# Patient Record
Sex: FEMALE | Race: WHITE | NOT HISPANIC OR LATINO | Employment: FULL TIME | URBAN - METROPOLITAN AREA
[De-identification: names, ages, dates, MRNs, and addresses within clinical notes are randomized per-mention and may not be internally consistent; named-entity substitution may affect disease eponyms.]

---

## 2017-04-07 ENCOUNTER — OFFICE VISIT (OUTPATIENT)
Dept: FAMILY MEDICINE | Facility: CLINIC | Age: 27
End: 2017-04-07
Payer: COMMERCIAL

## 2017-04-07 VITALS
BODY MASS INDEX: 35.77 KG/M2 | WEIGHT: 201.9 LBS | DIASTOLIC BLOOD PRESSURE: 82 MMHG | OXYGEN SATURATION: 99 % | SYSTOLIC BLOOD PRESSURE: 113 MMHG | HEIGHT: 63 IN | HEART RATE: 85 BPM | TEMPERATURE: 97.9 F

## 2017-04-07 DIAGNOSIS — Z00.00 ENCOUNTER FOR ROUTINE ADULT HEALTH EXAMINATION WITHOUT ABNORMAL FINDINGS: Primary | ICD-10-CM

## 2017-04-07 DIAGNOSIS — Z30.431 INTRAUTERINE DEVICE SURVEILLANCE: ICD-10-CM

## 2017-04-07 PROCEDURE — 99395 PREV VISIT EST AGE 18-39: CPT | Performed by: FAMILY MEDICINE

## 2017-04-07 PROCEDURE — G0145 SCR C/V CYTO,THINLAYER,RESCR: HCPCS | Performed by: FAMILY MEDICINE

## 2017-04-07 NOTE — MR AVS SNAPSHOT
After Visit Summary   4/7/2017    Liyah read    MRN: [id number]           Patient Information     Date Of Birth          1990        Visit Information        Provider Department      4/7/2017 3:00 PM Soumya Castellon MD [facility]        Today's Diagnoses     Encounter for routine adult health examination without abnormal findings    -  1    Intrauterine device surveillance          Care Instructions      Preventive Health Recommendations  Female Ages 26 - 39  Yearly exam:   See your health care provider every year in order to    Review health changes.     Discuss preventive care.      Review your medicines if you your doctor has prescribed any.    Until age 30: Get a Pap test every three years (more often if you have had an abnormal result).    After age 30: Talk to your doctor about whether you should have a Pap test every 3 years or have a Pap test with HPV screening every 5 years.   You do not need a Pap test if your uterus was removed (hysterectomy) and you have not had cancer.  You should be tested each year for STDs (sexually transmitted diseases), if you're at risk.   Talk to your provider about how often to have your cholesterol checked.  If you are at risk for diabetes, you should have a diabetes test (fasting glucose).  Shots: Get a flu shot each year. Get a tetanus shot every 10 years.   Nutrition:     Eat at least 5 servings of fruits and vegetables each day.    Eat whole-grain bread, whole-wheat pasta and brown rice instead of white grains and rice.    Talk to your provider about Calcium and Vitamin D.     Lifestyle    Exercise at least 150 minutes a week (30 minutes a day, 5 days of the week). This will help you control your weight and prevent disease.    Limit alcohol to one drink per day.    No smoking.     Wear sunscreen to prevent skin cancer.    See your dentist every six months for an exam and cleaning.          Follow-ups after your visit       "  Who to contact     If you have questions or need follow up information about today's clinic visit or your schedule please contact [facility] directly at [phone].  Normal or non-critical lab and imaging results will be communicated to you by MyChart, letter or phone within 4 business days after the clinic has received the results. If you do not hear from us within 7 days, please contact the clinic through MyChart or phone. If you have a critical or abnormal lab result, we will notify you by phone as soon as possible.  Submit refill requests through [email or web address] or call your pharmacy and they will forward the refill request to us. Please allow 3 business days for your refill to be completed.          Additional Information About Your Visit        [employer] Information     [email or web address] gives you secure access to your electronic health record. If you see a primary care provider, you can also send messages to your care team and make appointments. If you have questions, please call your primary care clinic.  If you do not have a primary care provider, please call [phone] and they will assist you.        Care EveryWhere ID     This is your Care EveryWhere ID. This could be used by other organizations to access your [facility] medical records  [id number]        Your Vitals Were     Pulse Temperature Height Last Period Pulse Oximetry Breastfeeding?    85 97.9  F (36.6  C) (Oral) 5' 3\" (1.6 m) 03/19/2017 (Exact Date) 99% No    BMI (Body Mass Index)                   35.76 kg/m2            Blood Pressure from Last 3 Encounters:   04/07/17 113/82   07/22/16 124/82   12/08/15 127/79    Weight from Last 3 Encounters:   04/07/17 201 lb 14.4 oz (91.6 kg)   07/22/16 209 lb 11.2 oz (95.1 kg)   12/08/15 187 lb (84.8 kg)              We Performed the Following     PAP imaged thin layer screen reflex to HPV if ASCUS - recommended age 25 - 29 years        Primary Care Provider Office Phone # Fax #    Soumya Castellon MD " [phone]       [facility] [address] [facility] [address] [address]        Thank you!     Thank you for choosing [facility]  for your care. Our goal is always to provide you with excellent care. Hearing back from our patients is one way we can continue to improve our services. Please take a few minutes to complete the written survey that you may receive in the mail after your visit with us. Thank you!             Your Updated Medication List - Protect others around you: Learn how to safely use, store and throw away your medicines at www.disposemymeds.org.          This list is accurate as of: 4/7/17  3:26 PM.  Always use your most recent med list.                   Brand Name Dispense Instructions for use    levonorgestrel 20 MCG/24HR IUD    MIRENA     1 each by Intrauterine route once

## 2017-04-07 NOTE — PROGRESS NOTES
SUBJECTIVE:     CC: Liyah read is an 26 year old woman who presents for preventive health visit.     Physical   Annual:     Getting at least 3 servings of Calcium per day::  NO    Bi-annual eye exam::  Yes    Dental care twice a year::  Yes    Sleep apnea or symptoms of sleep apnea::  None    Diet::  Regular (no restrictions)    Frequency of exercise::  2-3 days/week    Duration of exercise::  15-30 minutes    Taking medications regularly::  Not Applicable    Additional concerns today::  No    Exercise- walks a lot, in and out of work (walks dog a lot).  Gets to gym ~3x/wk, though less lately.  30 min cardio, 30 min of weights.    Contraception- mirena IUD, inserted 11/13.  By time it's due to come out, they may be ready to start trying to conceive.    Doing well otherwise- no concerns.      Today's PHQ-2 Score:   PHQ-2 ( 1999 Pfizer) 4/5/2017   Q1: Little interest or pleasure in doing things -   Q2: Feeling down, depressed or hopeless -   PHQ-2 Score -   Little interest or pleasure in doing things Not at all   Feeling down, depressed or hopeless Not at all   PHQ-2 Score 0       Abuse: Current or Past(Physical, Sexual or Emotional)- No  Do you feel safe in your environment - Yes    Social History   Substance Use Topics     Smoking status: Never Smoker     Smokeless tobacco: Never Used     Alcohol use Yes      Comment: occ     Occationally 1-2 times a month 1-2 drinks     Recent Labs   Lab Test  12/08/15   1630  12/05/14   1635   CHOL  145  140   HDL  38*  35*   LDL  92  88   TRIG  73  83   CHOLHDLRATIO   --   4.0   NHDL  107   --        Reviewed orders with patient.  Reviewed health maintenance and updated orders accordingly - Yes    Mammo Decision Support:  Mammogram not appropriate for this patient based on age.    Pertinent mammograms are reviewed under the imaging tab.  History of abnormal Pap smear: NO - age 21-29 PAP every 3 years recommended    Reviewed and updated as needed this visit by  clinical staff  Tobacco  Allergies  Meds  Problems  Med Hx  Surg Hx  Fam Hx  Soc Hx          Reviewed and updated as needed this visit by Provider  Tobacco  Problems  Med Hx  Surg Hx  Fam Hx  Soc Hx           ROS:  10 point ROS of systems including Constitutional, Eyes, Respiratory, Cardiovascular, Gastroenterology, Genitourinary, Integumentary, Muscularskeletal, Psychiatric were all negative except for pertinent positives noted in my HPI.      Problem list, Medication list, Allergies, and Medical/Social/Surgical histories reviewed in [facility] and updated as appropriate.  Labs reviewed in EPIC  BP Readings from Last 3 Encounters:   04/07/17 113/82   07/22/16 124/82   12/08/15 127/79    Wt Readings from Last 3 Encounters:   04/07/17 201 lb 14.4 oz (91.6 kg)   07/22/16 209 lb 11.2 oz (95.1 kg)   12/08/15 187 lb (84.8 kg)                  Patient Active Problem List   Diagnosis     Contraception     Elevated blood pressure - due to estrogen     CARDIOVASCULAR SCREENING; LDL GOAL LESS THAN 160     Intrauterine device surveillance     Past Surgical History:   Procedure Laterality Date     ACL repair  06/2008     Right lateral menisectomy  06/2008       Social History   Substance Use Topics     Smoking status: Never Smoker     Smokeless tobacco: Never Used     Alcohol use Yes      Comment: occ     Family History   Problem Relation Age of Onset     HEART DISEASE Paternal Grandmother      CHF     Hypertension Mother      Hypertension Maternal Grandmother      High cholesterol Maternal Grandmother      CANCER Maternal Grandfather      lung cancer     CANCER Father 50     lymphoma     Aneurysm Father 63     spotty vision, occasional weakness prior- then sudden death, smoker         Current Outpatient Prescriptions   Medication Sig Dispense Refill     levonorgestrel (MIRENA) 20 MCG/24HR IUD 1 each by Intrauterine route once       Allergies   Allergen Reactions     Nkda [No Known Drug Allergies]      Recent Labs   Lab  "Test  12/08/15   1630  12/05/14   1635   LDL  92  88   HDL  38*  35*   TRIG  73  83      OBJECTIVE:     /82  Pulse 85  Temp 97.9  F (36.6  C) (Oral)  Ht 5' 3\" (1.6 m)  Wt 201 lb 14.4 oz (91.6 kg)  LMP 03/19/2017 (Exact Date)  SpO2 99%  Breastfeeding? No  BMI 35.76 kg/m2  EXAM:  GENERAL: healthy, alert and no distress  EYES: Eyes grossly normal to inspection, PERRL and conjunctivae and sclerae normal  HENT: ear canals and TM's normal, nose and mouth without ulcers or lesions  NECK: no adenopathy, no asymmetry, masses, or scars and thyroid normal to palpation  RESP: lungs clear to auscultation - no rales, rhonchi or wheezes  BREAST: normal without masses, tenderness or nipple discharge and no palpable axillary masses or adenopathy  CV: regular rate and rhythm, normal S1 S2, no S3 or S4, no murmur, click or rub, no peripheral edema and peripheral pulses strong  ABDOMEN: soft, nontender, no hepatosplenomegaly, no masses and bowel sounds normal   (female): normal female external genitalia, normal urethral meatus, vaginal mucosa pink, moist, well rugated, and normal cervix/adnexa/uterus without masses or discharge  MS: no gross musculoskeletal defects noted, no edema  SKIN: no suspicious lesions or rashes  NEURO: Normal strength and tone, mentation intact and speech normal  PSYCH: mentation appears normal, affect normal/bright    ASSESSMENT/PLAN:         ICD-10-CM    1. Encounter for routine adult health examination without abnormal findings Z00.00 PAP imaged thin layer screen reflex to HPV if ASCUS - recommended age 25 - 29 years   2. Intrauterine device surveillance Z30.431      CPE - Discussed diet, calcium and exercise.  Went over Self Breast Exam.  Thin prep pap was done.  Eyes and Teeth or UTD or recommended f/u.  No immunizations needed today.  See orders below for tests ordered and screening needed.  Mirena IUD working well.  May remove and try to conceive when it's due to come " "out.        COUNSELING:  Reviewed preventive health counseling, as reflected in patient instructions    BP Screening:   Last 3 BP Readings:    BP Readings from Last 3 Encounters:   04/07/17 113/82   07/22/16 124/82   12/08/15 127/79       The following was recommended to the patient:  Re-screen BP within a year and recommended lifestyle modifications     reports that she has never smoked. She has never used smokeless tobacco.    Estimated body mass index is 35.76 kg/(m^2) as calculated from the following:    Height as of this encounter: 5' 3\" (1.6 m).    Weight as of this encounter: 201 lb 14.4 oz (91.6 kg).   Weight management plan: Cont work on diet/exercise    Counseling Resources:  ATP IV Guidelines  Pooled Cohorts Equation Calculator  Breast Cancer Risk Calculator  FRAX Risk Assessment  ICSI Preventive Guidelines  Dietary Guidelines for Americans, 2010  USDA's MyPlate  ASA Prophylaxis  Lung CA Screening    Soumya Castellon MD  [facility] for HPI/ROS submitted by the patient on 4/5/2017       "

## 2017-04-11 LAB
COPATH REPORT: NORMAL
PAP: NORMAL

## 2017-12-14 ENCOUNTER — OFFICE VISIT (OUTPATIENT)
Dept: URGENT CARE | Facility: URGENT CARE | Age: 27
End: 2017-12-14
Payer: COMMERCIAL

## 2017-12-14 VITALS
TEMPERATURE: 98.3 F | BODY MASS INDEX: 36.3 KG/M2 | OXYGEN SATURATION: 98 % | SYSTOLIC BLOOD PRESSURE: 127 MMHG | DIASTOLIC BLOOD PRESSURE: 84 MMHG | WEIGHT: 204.9 LBS | HEART RATE: 87 BPM

## 2017-12-14 DIAGNOSIS — R07.0 THROAT PAIN: Primary | ICD-10-CM

## 2017-12-14 LAB
DEPRECATED S PYO AG THROAT QL EIA: NORMAL
SPECIMEN SOURCE: NORMAL

## 2017-12-14 PROCEDURE — 87880 STREP A ASSAY W/OPTIC: CPT | Performed by: FAMILY MEDICINE

## 2017-12-14 PROCEDURE — 99213 OFFICE O/P EST LOW 20 MIN: CPT | Performed by: FAMILY MEDICINE

## 2017-12-14 PROCEDURE — 87081 CULTURE SCREEN ONLY: CPT | Performed by: FAMILY MEDICINE

## 2017-12-14 NOTE — MR AVS SNAPSHOT
After Visit Summary   12/14/2017    Liyah read    MRN: [id number]           Patient Information     Date Of Birth          1990        Visit Information        Provider Department      12/14/2017 9:20 AM Lonnie Otoole MD [facility]        Today's Diagnoses     Throat pain    -  1      Care Instructions    Okay to take ibuprofen 200 mg - 4 tablets (800 mg) every 8 hours as needed.  Okay to take tylenol 500 mg - 2 tablets (1000 mg) every 6-8 hours as needed, do not exceed 3000 mg in 24 hours.      Viral Pharyngitis (Sore Throat)    You (or your child, if your child is the patient) have pharyngitis (sore throat). This infection is caused by a virus. It can cause throat pain that is worse when swallowing, aching all over, headache, and fever. The infection may be spread by coughing, kissing, or touching others after touching your mouth or nose. Antibiotic medications do not work against viruses, so they are not used for treating this condition.  Home care    If your symptoms are severe, rest at home. Return to work or school when you feel well enough.     Drink plenty of fluids to avoid dehydration.    For children: Use acetaminophen for fever, fussiness or discomfort. In infants over six months of age, you may use ibuprofen instead of acetaminophen. (NOTE: If your child has chronic liver or kidney disease or ever had a stomach ulcer or GI bleeding, talk with your doctor before using these medicines.) (NOTE: Aspirin should never be used in anyone under 18 years of age who is ill with a fever. It may cause severe liver damage.)     For adults: You may use acetaminophen or ibuprofen to control pain or fever, unless another medicine was prescribed for this. (NOTE: If you have chronic liver or kidney disease or ever had a stomach ulcer or GI bleeding, talk with your doctor before using these medicines.)    Throat lozenges or numbing throat sprays can help reduce pain. Gargling  with warm salt water will also help reduce throat pain. For this, dissolve 1/2 teaspoon of salt in 1 glass of warm water. To help soothe a sore throat, children can sip on juice or a popsicle. Children 5 years and older can also suck on a lollipop or hard candy.    Avoid salty or spicy foods, which can be irritating to the throat.  Follow-up care  Follow up with your healthcare provider or our staff if you are not improving over the next week.  When to seek medical advice  Call your healthcare provider right away if any of these occur:    Fever as directed by your doctor.  For children, seek care if:    Your child is of any age and has repeated fevers above 104 F (40 C).    Your child is younger than 2 years of age and has a fever of 100.4 F (38 C) that continues for more than 1 day.    Your child is 2 years old or older and has a fever of 100.4 F (38 C) that continues for more than 3 days.    New or worsening ear pain, sinus pain, or headache    Painful lumps in the back of neck    Stiff neck    Lymph nodes are getting larger    Inability to swallow liquids, excessive drooling, or inability to open mouth wide due to throat pain    Signs of dehydration (very dark urine or no urine, sunken eyes, dizziness)    Trouble breathing or noisy breathing    Muffled voice    New rash    Child appears to be getting sicker  Date Last Reviewed: 4/13/2015 2000-2017 [employer]. [address] [place] [address]. All rights reserved. This information is not intended as a substitute for professional medical care. Always follow your healthcare professional's instructions.                Follow-ups after your visit        Follow-up notes from your care team     Return if symptoms worsen or fail to improve.      Who to contact     If you have questions or need follow up information about today's clinic visit or your schedule please contact [facility] directly at [phone].  Normal or  non-critical lab and imaging results will be communicated to you by MyChart, letter or phone within 4 business days after the clinic has received the results. If you do not hear from us within 7 days, please contact the clinic through [employer] or phone. If you have a critical or abnormal lab result, we will notify you by phone as soon as possible.  Submit refill requests through Rise or call your pharmacy and they will forward the refill request to us. Please allow 3 business days for your refill to be completed.          Additional Information About Your Visit        Rise Information     Rise gives you secure access to your electronic health record. If you see a primary care provider, you can also send messages to your care team and make appointments. If you have questions, please call your primary care clinic.  If you do not have a primary care provider, please call [phone] and they will assist you.        Care EveryWhere ID     This is your Care EveryWhere ID. This could be used by other organizations to access your [facility] medical records  [id number]        Your Vitals Were     Pulse Temperature Pulse Oximetry BMI (Body Mass Index)          87 98.3  F (36.8  C) (Oral) 98% 36.3 kg/m2         Blood Pressure from Last 3 Encounters:   12/14/17 127/84   04/07/17 113/82   07/22/16 124/82    Weight from Last 3 Encounters:   12/14/17 204 lb 14.4 oz (92.9 kg)   04/07/17 201 lb 14.4 oz (91.6 kg)   07/22/16 209 lb 11.2 oz (95.1 kg)              We Performed the Following     Beta strep group A culture     Strep, Rapid Screen        Primary Care Provider Office Phone # Fax #    Soumya Castellon [phone] [address] [address]        Equal Access to Services     JAYNE PHELPS : Pamela Collins, clinton he, tatum serrano, daphne scruggs. So [facility] [phone].    ATENCIÓN: stuart Sims  gonzalez disposición servicios gratuitos de asistencia lingüística. Frederick gaston [phone].    We comply with applicable federal civil rights laws and [place] laws. We do not discriminate on the basis of race, color, national origin, age, disability, sex, sexual orientation, or gender identity.            Thank you!     Thank you for choosing [facility]  for your care. Our goal is always to provide you with excellent care. Hearing back from our patients is one way we can continue to improve our services. Please take a few minutes to complete the written survey that you may receive in the mail after your visit with us. Thank you!             Your Updated Medication List - Protect others around you: Learn how to safely use, store and throw away your medicines at www.disposemymeds.org.          This list is accurate as of: 12/14/17 12:42 PM.  Always use your most recent med list.                   Brand Name Dispense Instructions for use Diagnosis    levonorgestrel 20 MCG/24HR IUD    MIRENA     1 each by Intrauterine route once    Encounter for routine adult health examination without abnormal findings

## 2017-12-14 NOTE — PROGRESS NOTES
SUBJECTIVE:   Liyah read is a 27 year old female presenting with a chief complaint of sore throat.  Endorse low grade fever.  Glands swollen.  Had mono in the past.  Onset of symptoms was 4 day(s) ago.  Course of illness is worsening.    Severity moderate  Current and Associated symptoms: sore throat  Treatment measures tried include Tylenol/Ibuprofen, Fluids and Rest.  Predisposing factors include exposure to strep.    Past Medical History:   Diagnosis Date     Contraception     depo for a few yrs - to Mirena in 11/13     Current Outpatient Prescriptions   Medication Sig Dispense Refill     levonorgestrel (MIRENA) 20 MCG/24HR IUD 1 each by Intrauterine route once       Social History   Substance Use Topics     Smoking status: Never Smoker     Smokeless tobacco: Never Used     Alcohol use Yes      Comment: occ       ROS:  CONSTITUTIONAL:NEGATIVE for fever, chills, change in weight and POSITIVE  for fatigue and malaise  INTEGUMENTARY/SKIN: NEGATIVE for worrisome rashes, moles or lesions  ENT/MOUTH: POSITIVE for nasal congestion, sore throat and swollen glands  RESP:NEGATIVE for significant cough or SOB  CV: NEGATIVE for chest pain, palpitations or peripheral edema  GI: NEGATIVE for nausea, abdominal pain, heartburn, or change in bowel habits    OBJECTIVE:  /84 (BP Location: Right arm, Patient Position: Chair, Cuff Size: Adult Large)  Pulse 87  Temp 98.3  F (36.8  C) (Oral)  Wt 204 lb 14.4 oz (92.9 kg)  SpO2 98%  BMI 36.3 kg/m2  GENERAL APPEARANCE: healthy, alert and no distress  EYES: EOMI,  PERRL, conjunctiva clear  HENT: ear canals and TM's normal.  Nose and mouth without ulcers, erythema or lesions.  Pharynx pink, no exudates.  No sinus tenderness on percussion  NECK: supple, nontender, no lymphadenopathy  RESP: lungs clear to auscultation - no rales, rhonchi or wheezes  CV: regular rates and rhythm, normal S1 S2, no murmur noted  NEURO: Normal strength and tone, sensory exam grossly  normal,  normal speech and mentation  SKIN: no suspicious lesions or rashes    Results for orders placed or performed in visit on 12/14/17   Strep, Rapid Screen   Result Value Ref Range    Specimen Description Throat     Rapid Strep A Screen       NEGATIVE: No Group A streptococcal antigen detected by immunoassay, await culture report.       ASSESSMENT/PLAN:  (R07.0) Throat pain  (primary encounter diagnosis)  Comment: viral  Plan: Strep, Rapid Screen, Beta strep group A culture            Reassurance given, reviewed symptomatic treatment, plenty of fluids and rest.  Will follow up on throat culture and treat if positive for strep.  Offered magic mouthwash but declined today.    Return to clinic if no resolution of symptoms.    Lonnie Otoole MD  December 14, 2017 12:42 PM

## 2017-12-14 NOTE — PATIENT INSTRUCTIONS
Okay to take ibuprofen 200 mg - 4 tablets (800 mg) every 8 hours as needed.  Okay to take tylenol 500 mg - 2 tablets (1000 mg) every 6-8 hours as needed, do not exceed 3000 mg in 24 hours.      Viral Pharyngitis (Sore Throat)    You (or your child, if your child is the patient) have pharyngitis (sore throat). This infection is caused by a virus. It can cause throat pain that is worse when swallowing, aching all over, headache, and fever. The infection may be spread by coughing, kissing, or touching others after touching your mouth or nose. Antibiotic medications do not work against viruses, so they are not used for treating this condition.  Home care    If your symptoms are severe, rest at home. Return to work or school when you feel well enough.     Drink plenty of fluids to avoid dehydration.    For children: Use acetaminophen for fever, fussiness or discomfort. In infants over six months of age, you may use ibuprofen instead of acetaminophen. (NOTE: If your child has chronic liver or kidney disease or ever had a stomach ulcer or GI bleeding, talk with your doctor before using these medicines.) (NOTE: Aspirin should never be used in anyone under 18 years of age who is ill with a fever. It may cause severe liver damage.)     For adults: You may use acetaminophen or ibuprofen to control pain or fever, unless another medicine was prescribed for this. (NOTE: If you have chronic liver or kidney disease or ever had a stomach ulcer or GI bleeding, talk with your doctor before using these medicines.)    Throat lozenges or numbing throat sprays can help reduce pain. Gargling with warm salt water will also help reduce throat pain. For this, dissolve 1/2 teaspoon of salt in 1 glass of warm water. To help soothe a sore throat, children can sip on juice or a popsicle. Children 5 years and older can also suck on a lollipop or hard candy.    Avoid salty or spicy foods, which can be irritating to the throat.  Follow-up  care  Follow up with your healthcare provider or our staff if you are not improving over the next week.  When to seek medical advice  Call your healthcare provider right away if any of these occur:    Fever as directed by your doctor.  For children, seek care if:    Your child is of any age and has repeated fevers above 104 F (40 C).    Your child is younger than 2 years of age and has a fever of 100.4 F (38 C) that continues for more than 1 day.    Your child is 2 years old or older and has a fever of 100.4 F (38 C) that continues for more than 3 days.    New or worsening ear pain, sinus pain, or headache    Painful lumps in the back of neck    Stiff neck    Lymph nodes are getting larger    Inability to swallow liquids, excessive drooling, or inability to open mouth wide due to throat pain    Signs of dehydration (very dark urine or no urine, sunken eyes, dizziness)    Trouble breathing or noisy breathing    Muffled voice    New rash    Child appears to be getting sicker  Date Last Reviewed: 4/13/2015 2000-2017 [employer]. [address] [place] [address], [place] [address]. All rights reserved. This information is not intended as a substitute for professional medical care. Always follow your healthcare professional's instructions.

## 2017-12-14 NOTE — NURSING NOTE
"Chief Complaint   Patient presents with     Urgent Care     Pharyngitis     Pt states has been having sore throat x 4 days ago. Also states having low grade fever.        Initial /84 (BP Location: Right arm, Patient Position: Chair, Cuff Size: Adult Large)  Pulse 87  Temp 98.3  F (36.8  C) (Oral)  Wt 204 lb 14.4 oz (92.9 kg)  SpO2 98%  BMI 36.3 kg/m2 Estimated body mass index is 36.3 kg/(m^2) as calculated from the following:    Height as of 4/7/17: 5' 3\" (1.6 m).    Weight as of this encounter: 204 lb 14.4 oz (92.9 kg).  Medication Reconciliation: unable or not appropriate to perform   Patience Melgar CMA ([facility]) 12/14/2017 10:09 AM    "

## 2017-12-15 LAB
BACTERIA SPEC CULT: NORMAL
SPECIMEN SOURCE: NORMAL

## 2018-04-11 ENCOUNTER — OFFICE VISIT (OUTPATIENT)
Dept: MIDWIFE SERVICES | Facility: CLINIC | Age: 28
End: 2018-04-11
Payer: COMMERCIAL

## 2018-04-11 VITALS
DIASTOLIC BLOOD PRESSURE: 93 MMHG | HEART RATE: 72 BPM | BODY MASS INDEX: 38.09 KG/M2 | WEIGHT: 215 LBS | SYSTOLIC BLOOD PRESSURE: 131 MMHG

## 2018-04-11 DIAGNOSIS — Z00.00 ROUTINE GENERAL MEDICAL EXAMINATION AT A HEALTH CARE FACILITY: Primary | ICD-10-CM

## 2018-04-11 DIAGNOSIS — Z30.432 ENCOUNTER FOR IUD REMOVAL: ICD-10-CM

## 2018-04-11 PROCEDURE — 99385 PREV VISIT NEW AGE 18-39: CPT | Mod: 25 | Performed by: ADVANCED PRACTICE MIDWIFE

## 2018-04-11 PROCEDURE — 58301 REMOVE INTRAUTERINE DEVICE: CPT | Performed by: ADVANCED PRACTICE MIDWIFE

## 2018-04-11 NOTE — MR AVS SNAPSHOT
After Visit Summary   4/11/2018    Liyah Rodriguez    MRN: [id number]           Patient Information     Date Of Birth          1990        Visit Information        Provider Department      4/11/2018 12:00 PM Nedra Ward CNM [facility]        Today's Diagnoses     Routine general medical examination at a health care facility    -  1    Encounter for IUD removal           Follow-ups after your visit        Follow-up notes from your care team     Return if symptoms worsen or fail to improve.      Who to contact     If you have questions or need follow up information about today's clinic visit or your schedule please contact [facility] directly at [phone].  Normal or non-critical lab and imaging results will be communicated to you by MyChart, letter or phone within 4 business days after the clinic has received the results. If you do not hear from us within 7 days, please contact the clinic through Reelationhart or phone. If you have a critical or abnormal lab result, we will notify you by phone as soon as possible.  Submit refill requests through DailyBurn or call your pharmacy and they will forward the refill request to us. Please allow 3 business days for your refill to be completed.          Additional Information About Your Visit        MyChart Information     DailyBurn gives you secure access to your electronic health record. If you see a primary care provider, you can also send messages to your care team and make appointments. If you have questions, please call your primary care clinic.  If you do not have a primary care provider, please call [phone] and they will assist you.        Care EveryWhere ID     This is your Care EveryWhere ID. This could be used by other organizations to access your [facility] medical records  [id number]        Your Vitals Were     Pulse Last Period BMI (Body Mass Index)             72 04/05/2018 38.09 kg/m2           Blood Pressure from Last 3 Encounters:   04/11/18 (!) 131/93   12/14/17 127/84   04/07/17 113/82    Weight from Last 3 Encounters:   04/11/18 215 lb (97.5 kg)   12/14/17 204 lb 14.4 oz (92.9 kg)   04/07/17 201 lb 14.4 oz (91.6 kg)              We Performed the Following     REMOVE INTRAUTERINE DEVICE        Primary Care Provider Office Phone # Fax #    Soumya Castellon [phone] [address] [address]        Equal Access to Services     [facility]: Hadii aad ku hadasho Soomaali, waaxda luqadaha, qaybta kaalmada adeegyada, daphne powell adelara vela . So [facility] [phone].    ATENCIÓN: Si habla español, tiene a gonzalez disposición servicios gratuitos de asistencia lingüística. Llame al [phone].    We comply with applicable federal civil rights laws and [place] laws. We do not discriminate on the basis of race, color, national origin, age, disability, sex, sexual orientation, or gender identity.            Thank you!     Thank you for choosing [facility]  for your care. Our goal is always to provide you with excellent care. Hearing back from our patients is one way we can continue to improve our services. Please take a few minutes to complete the written survey that you may receive in the mail after your visit with us. Thank you!             Your Updated Medication List - Protect others around you: Learn how to safely use, store and throw away your medicines at www.disposemymeds.org.          This list is accurate as of 4/11/18  2:08 PM.  Always use your most recent med list.                   Brand Name Dispense Instructions for use Diagnosis    levonorgestrel 20 MCG/24HR IUD    MIRENA     1 each by Intrauterine route once    Encounter for routine adult health examination without abnormal findings

## 2018-04-11 NOTE — NURSING NOTE
"Chief Complaint   Patient presents with     Physical       Initial BP (!) 131/93  Pulse 72  Wt 215 lb (97.5 kg)  LMP 2018  BMI 38.09 kg/m2 Estimated body mass index is 38.09 kg/(m^2) as calculated from the following:    Height as of 17: 5' 3\" (1.6 m).    Weight as of this encounter: 215 lb (97.5 kg).  BP completed using cuff size: large        The following HM Due: NONE      The following patient reported/Care Every where data was sent to:  P ABSTRACT QUALITY INITIATIVES [34398]        N/a    Jud Cornelius MA              "

## 2018-04-19 NOTE — PROGRESS NOTES
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 Largest invasive focus measured 3.8 cm in size. Surgical margins negative. One out of 10 lymph nodes positive for metastatic carcinoma with focal extracapsular extension. She completed 6 cycles of adjuvant TCH-P followed by adjuvant right chest wall radiation therapy (50.4 Gy) completed on December 7, 2016. She completed adjuvant 3 weekly maintenance trastuzumab on Beatriz 15, 2017. She was treated with anastrozole between December 2016 and November 2017.  Treatment was switched to letrozole because of arthralgia     She developed back pain in November last year. CT scan of chest showed new 10 mm nodular opacity right middle lobe lung and a 6 mm nodule right lower lobe.PET scan is suggestive of radiation pneumonitis right middle lobe , right lower lobe nodule measuring 6 mm was stable.       She was seen in clinic last month. I ordered another CT scan of chest to follow-up on right lower lobe lung nodule. This however has not been done yet. She offers no new complaints except intermittent abdominal discomfort since yesterday. Denies any nausea vomiting diarrhea. Does have occasional dry cough.      OTHER MEDICAL CONDITIONS  Patient Active Problem List   Diagnosis   • Depression   • Chronic upper back pain   • Postmenopausal   • OCD (obsessive compulsive disorder)   • Ceruminosis   • Malignant neoplasm of upper-outer quadrant of right female breast (CMS/HCC)   • Acute midline low back pain without sciatica   • Mouth sores   • Osteoporosis, DEXA 1/31/2017   • Hypercholesteremia   • Osteoarthritis of both knees     ALLERGIES  Allergies as of 04/19/2018 - Reviewed  Liyah is a 27 year old  female who presents for annual exam.     Menses are regular q 28-30 days and normal lasting 8 days.  Menses flow: normal and light.  Patient's last menstrual period was 2018.. Using IUD for contraception.  She is currently considering pregnancy.  Besides routine health maintenance,  she would like to discuss  Removal of iud. She is taking prenatal vitamins.    GYNECOLOGIC HISTORY:  Menarche: 12  Liyah is sexually active with 1 male partner(s) and is currently in monogamous relationship with .    History sexually transmitted infections:No STD history  STI testing offered?  Declined  RAÚL exposure: Unknown  History of abnormal Pap smear: NO - age 21-29 PAP every 3 years recommended  Family history of breast CA: No  Family history of uterine/ovarian CA: No    Family history of colon CA: No    HEALTH MAINTENANCE:  Cholesterol: (  Cholesterol   Date Value Ref Range Status   2015 145 <200 mg/dL Final   2014 140 <200 mg/dL Final     Comment:     LDL Cholesterol is the primary guide to therapy.   The NCEP recommends further evaluation of: patients with cholesterol greater   than 200 mg/dL if additional risk factors are present, cholesterol greater   than   240 mg/dL, triglycerides greater than 150 mg/dL, or HDL less than 40 mg/dL.      History of abnormal lipids: No    Mammo: no . History of abnormal Mammo: Not applicable.  Regular Self Breast Exams: Yes    Current or Past (Physical, Sexual or Emotional):  No  Do you feel safe in your environment:  Yes    Calcium/Vitamin D intake: source:  dairy, dietary supplement(s) Adequate? Yes   Last TDAP?  Offered today? No    TSH: (No results found for: TSH )  Pap; (  Lab Results   Component Value Date    PAP NIL 2017    PAP NIL 2013    )    HISTORY:  Obstetric History       T0      L0     SAB0   TAB0   Ectopic0   Multiple0   Live Births0         Past Medical History:   Diagnosis Date      2018   Allergen Reaction Noted   • Aspirin Other (See Comments)        Current Outpatient Prescriptions   Medication   • sertraline (ZOLOFT) 100 MG tablet   • naproxen (NAPROSYN) 500 MG tablet   • Magnesium 200 MG Tab   • alendronate (FOSAMAX) 70 MG tablet   • KRILL OIL PO   • Multiple Vitamins-Minerals (B COMPLEX VITAMINS PLUS PO)   • DISPENSE   • Cholecalciferol (VITAMIN D3) 2000 UNITS capsule   • Glucosamine HCl 1000 MG TABS   • Multiple Vitamins-Minerals (CENTRUM SILVER PO)   • traMADOL (ULTRAM) 50 MG tablet   • clotrimazole-betamethasone (LOTRISONE) cream     No current facility-administered medications for this visit.      Facility-Administered Medications Ordered in Other Visits   Medication   • heparin flush 100 UNIT/ML lock flush 500 Units       FAMILY HISTORY  Family History   Problem Relation Age of Onset   • Alcohol Abuse Mother      ETOH   • Arthritis Mother      rheumatoid arthritis   • Alcohol Abuse Father      ETOH   • Depression Maternal Grandmother        SOCIAL HISTORY  Social History     Social History   • Marital status: Single     Spouse name: N/A   • Number of children: N/A   • Years of education: N/A     Social History Main Topics   • Smoking status: Never Smoker   • Smokeless tobacco: Never Used   • Alcohol use No   • Drug use: No   • Sexual activity: Not on file     Other Topics Concern   • Not on file     Social History Narrative    Family History:  Mother  age 60, alcoholism.  Father  age 89 in [place], with CVA.  Patient has a twin brother and one older brother.  Patient has 3 sisters, but one  of LONDON's.  Patient is the youngest.        Social  History:  Patient was born in [place].  Completed [employer] courses.  She works in [employer] at [employer].  No children.  Lives by herself in [place].  Hobbies include reading and maria luisa, drawing.         REVIEW OF SYSTEMS  Emmy Kumar  2018 10:16 AM  Sign at close encounter  Eye  Contraception     depo for a few yrs - to Mirena in 11/13     Past Surgical History:   Procedure Laterality Date     ACL repair  06/2008     Right lateral menisectomy  06/2008     Family History   Problem Relation Age of Onset     HEART DISEASE Paternal Grandmother      CHF     Hypertension Mother      Hypertension Maternal Grandmother      High cholesterol Maternal Grandmother      CANCER Maternal Grandfather      lung cancer     CANCER Father 50     lymphoma     Aneurysm Father 63     spotty vision, occasional weakness prior- then sudden death, smoker     Social History     Social History     Marital status: Single     Spouse name: N/A     Number of children: N/A     Years of education: N/A     Occupational History      Lolita     RN at [employer]     Social History Main Topics     Smoking status: Never Smoker     Smokeless tobacco: Never Used     Alcohol use Yes      Comment: occ     Drug use: No     Sexual activity: Yes     Birth control/ protection: IUD      Comment: Mirena IUD inserted 11/13     Other Topics Concern     None     Social History Narrative       Current Outpatient Prescriptions:      levonorgestrel (MIRENA) 20 MCG/24HR IUD, 1 each by Intrauterine route once, Disp: , Rfl:      Allergies   Allergen Reactions     Nkda [No Known Drug Allergies]        Past medical, surgical, social and family history were reviewed and updated in EPIC.    ROS:   C:     NEGATIVE for fever, chills, change in weight  I:       NEGATIVE for worrisome rashes, moles or lesions  E:     NEGATIVE for vision changes or irritation  E/M: NEGATIVE for ear, mouth and throat problems  R:     NEGATIVE for significant cough or SOB  CV:   NEGATIVE for chest pain, palpitations or peripheral edema  GI:     NEGATIVE for nausea, abdominal pain, heartburn, or change in bowel habits  :   NEGATIVE for frequency, dysuria, hematuria, vaginal discharge, or irregular bleeding  M:     NEGATIVE for significant arthralgias or myalgia  N:       NEGATIVE for weakness, dizziness or paresthesias  E:      NEGATIVE for temperature intolerance, skin/hair changes  P:      NEGATIVE for changes in mood or affect.    EXAM:  BP (!) 131/93  Pulse 72  Wt 215 lb (97.5 kg)  LMP 04/05/2018  BMI 38.09 kg/m2   BMI: Body mass index is 38.09 kg/(m^2).  Constitutional: healthy, alert and no distress  Head: Normocephalic. No masses, lesions, tenderness or abnormalities  Neck: Neck supple. Trachea midline. No adenopathy. Thyroid symmetric, normal size.   Cardiovascular: RRR.   Respiratory: Negative.   Breast: No nodularity, asymmetry or nipple discharge bilaterally.  Gastrointestinal: Abdomen soft, non-tender, non-distended. No masses, organomegaly.  :  Vulva:  No external lesions, normal female hair distribution, no inguinal adenopathy.    Urethra:  Midline, non-tender, well supported, no discharge  Vagina:  Moist, pink, no abnormal discharge, no lesions  Speculum placed and IUD string visualized   Uterus:  Normal size , non-tender, freely mobile  Ovaries:  No masses appreciated, non-tender, mobile  Rectal Exam: deferred  Musculoskeletal: extremities normal  Skin: no suspicious lesions or rashes  Psychiatric: Affect appropriate, cooperative,mentation appears normal.   BP retaken 115/78     COUNSELING:   Reviewed preventive health counseling, as reflected in patient instructions       Regular exercise       Healthy diet/nutrition       Family planning       Folic Acid Counseling   reports that she has never smoked. She has never used smokeless tobacco.    Body mass index is 38.09 kg/(m^2).  Weight management plan: Discussed healthy diet and exercise guidelines and patient will follow up in 12 months in clinic to re-evaluate.  FRAX Risk Assessment    ASSESSMENT:  27 year old female with satisfactory annual exam  IUD removal   Preconception counseling - taking folic acid     INDICATIONS:                                                      Is a pregnancy test required:  Problem(s):negative  ENT Problem(s): Intermittent runny nose   Cardiovascular problem(s):negative  Respiratory problem(s): cough  Gastro-intestinal problem(s): abdominal pain  Genito-urinary problem(s):negative  Musculoskeletal problem(s):negative  Integumentary problem(s):negative  Neurological problem(s):negative  Psychiatric problem(s):negative  Endocrine problem(s):negative  Hematologic and/or Lymphatic problem(s):negative        LABS  Lab Results   Component Value Date    WBC 4.9 04/11/2018    HGB 13.1 04/11/2018    HCT 39.7 04/11/2018     04/11/2018    GLUCOSE 93 04/11/2018    CREATININE 0.61 04/11/2018    GPT 37 04/11/2018        IMAGING  Mammo Screening W Kandice Left    Result Date: 4/3/2018  EXAM:  MAMMO SCREENING W KANDICE LEFT DATE OF EXAM:  4/2/2018 9:01 AM. CLINICAL INDICATION:  Screening.    COMPARISON EXAMS:  Mammograms dated 3/30/2017, 2/22/2016 and 1/13/2014. TECHNIQUE:  Digital screening mammogram with 2D and tomosynthesis (3D). Computer-Aided Detection was utilized. DENSITY:  There are scattered areas of fibroglandular density. FINDINGS:  No suspicious masses, calcifications or architectural distortion in the left breast.        IMPRESSION:  No signs of malignancy in the left breast. Recommend routine screening mammogram.         BI-RADS:  1 - Negative. In compliance with federal regulations, the patient will be sent a lay summary of the results of this mammogram.       PHYSICAL EXAMINATION  General: The patient is a  57 year old female who is alert, oriented times 3, in no apparent distress.  Vitals: :  Visit Vitals  /79 (BP Location: [facility], Patient Position: Sitting, Cuff Size: Regular)   Pulse 69   Resp 16   Ht 5' 4\" (1.626 m)   Wt 67.1 kg   LMP 09/28/2011 (Approximate) Comment: Sometime in 2011 approximate   SpO2 94%   BMI 25.40 kg/m²     HEENT:  Pupils equally round, reactive to light with extraocular movements intact.  Anicteric sclerae with no oral lesions.  Neck:  Supple with no  No.  Was a consent obtained?  Yes    Liyah Rodriguez is a 27 year old female,, Patient's last menstrual period was 2018. who presents today for IUD removal. Her current IUD was placed 5 ago. She has not had problems with the IUD. She requests removal of the IUD because she desires to conceive    Today's PHQ-2 Score:   PHQ-2 (  [employer]) 2017   Q1: Little interest or pleasure in doing things -   Q2: Feeling down, depressed or hopeless -   PHQ-2 Score -   Q1: Little interest or pleasure in doing things Not at all   Q2: Feeling down, depressed or hopeless Not at all   PHQ-2 Score 0       PROCEDURE:                                                      A speculum exam was performed and the cervix was visualized. The IUD string was visualized. Using ring forceps, the string  was grasped and the IUD removed intact.    POST PROCEDURE:                                                      The patient tolerated the procedure well. Patient was discharged in stable condition.    Call if bleeding, pain or fever occur. and Pregnancy counseling given, including folic acid supplementation 800-1000 mg per day.    Nedra Ward CNM         cervical or supraclavicular lymphadenopathy.  No jugular venous distention or carotid bruit.  There is no thyromegaly.   Lungs:  Clear to auscultation bilaterally with no dullness to percussion.  There is no evidence of wheezing or rales on auscultation.   Cardiovascular:  Regular rate and rhythm.  No S3, S4 or any murmurs. There is no pericardial rub.   Abdomen:  Soft, nontender, no hepatosplenomegaly.    No abnormal masses are palpable.  Extremities: No cyanosis, clubbing or edema.       IMPRESSION  #1 stage IIB right breast cancer, remains in remission .  #2. Right lower lobe lung nodule. Likely benign  2. Osteoporosis on alendronate    PLAN  Will order CT scan of chest with IV contrast. Patient will be notified with results. Follow-up in 3 months for surveillance of breast cancer .

## 2018-10-01 ENCOUNTER — OFFICE VISIT (OUTPATIENT)
Dept: MIDWIFE SERVICES | Facility: CLINIC | Age: 28
End: 2018-10-01
Payer: COMMERCIAL

## 2018-10-01 VITALS
OXYGEN SATURATION: 99 % | SYSTOLIC BLOOD PRESSURE: 128 MMHG | BODY MASS INDEX: 38.54 KG/M2 | TEMPERATURE: 97.5 F | HEART RATE: 73 BPM | DIASTOLIC BLOOD PRESSURE: 88 MMHG | WEIGHT: 217.5 LBS | HEIGHT: 63 IN

## 2018-10-01 DIAGNOSIS — Z31.69 ENCOUNTER FOR PRECONCEPTION CONSULTATION: Primary | ICD-10-CM

## 2018-10-01 PROCEDURE — 99213 OFFICE O/P EST LOW 20 MIN: CPT | Performed by: ADVANCED PRACTICE MIDWIFE

## 2018-10-01 NOTE — PROGRESS NOTES
Liyah Rodriguez 28 year old Patient's last menstrual period was 2018.      CC; Conception counseling    HPI: Pt had Mirena IUD removed 6 months ago, has been having unprotected IC since that time and is not pregnant yet.      Pt denies smoking, denies hx of PID or STDs    Reports 22-27 day cycles, reports + ovulation predictor kits, and + cervical mucous, + temp spike at ovulation.  Denies use of lubricant.    BMI of Body mass index is 38.53 kg/(m^2).     Discussed improving diet and exercise as a way to help her body run more efficiently.    Pt taking prenatal vitamins    ASSESSMENT:  Healthy, 29 y/o desires conception    PLAN:  Reassured normal, average couple takes 8 months to conceive, pt seems well informed and educated about fertility s/s.  Encouraged to try for 3-6 more months.    If no conception then for pt to see one of our MDs for infertility work up.    Roz Dewey CNM     > 50% of time was spent in counseling regarding conception.  Time spent in face to face discussion 15 mins    Roz Dewey

## 2018-10-01 NOTE — MR AVS SNAPSHOT
After Visit Summary   10/1/2018    Liyah Rodriguez    MRN: [id number]           Patient Information     Date Of Birth          1990        Visit Information        Provider Department      10/1/2018 3:15 PM Roz Dewey APRN [facility] Instructions    Cycle instructions:    The first day of bleeding/period is called Day 1.    Start testing for ovulation using the store bought ovulation predictor kits on Day 8 - 10 of cycle.  When you get a positive surge, you will most likely be ovulating within the next 24 hours.       Test the urine about the same time each day.  (should try to test between 2-4 pm, empty bladder about noon)         The test is positive when you see 2 dark solid lines.  (one faint line and one dark line is NOT positive)       Once the test is positive, you can stop testing.  Have sex/intercourse the day the test is positive.  The next day, have sex again.    If you don't have a period by 15-16 days after LH kit is positive (surge) then do urine pregnancy test and call clinic if positive.      Basic info:  The ovulation predictor kits are found in the condom/tampon section of the store.  Clear blue easy brand is simple to read.  Most women will get a positive LH surge before day 20 of the cycle.    Do not use lubercants with intercourse when trying to get pregnant.    Another sign of ovulation is ovulation discharge.  That is when the woman get a specific type of vaginal discharge that is the consistency of an egg white, it is slippery and yet stays in a clump.      When trying to conceive it is important that there are enough sperm in the one act of intercourse.  Try not to have intercourse and/or masturbate more than once a day.  The sperm as a group push each other up through the vagina, cervix, uterus and into the fallopian tubes and the egg.    If you have any questions please call us at [phone].    Roz Dewey        Infertility and Pregnancy  Teas  RASPBERRY LEAVES    -relaxing and toning for uterus  -help to strengthen and tone the uterine and pelvic muscles  -tones the mucous membranes throughout the body, soothe the kidneys, and urinary tract,  -stops diarrhea  -stop hemorrhage  -reduces pain of childbirth  -stimulates milk supply  -makes contractions more effective and productive  -easing and shortening childbirth  -best taken as warm infusion (a strong tea)  -one to three times per day    NETTLE    -highly nutritious  -high in Vitamins and minerals: especially iron, silica and potassium  -nourishes weakness  -helps alleviate anemia  -stimulating effects on bladder and kidneys  -relieves fluid retention, bladder infection  -stimulate milk production  -stops heavy periods, brings on delayed periods  -helps relieve hayfever, asthma  -helps alleviate diarrhea  -reduce blood sugar    SAW PALMETTO    -Enhance digestion  -improve strength and vitality  -nourishing and tonic effects for the reproductive system  -to assist infertility  -regulates menstrual cycle  -inflammatory conditions such as salpingitis (inflammation of fallopian tubes)  -relieves ovarian pain  -relieves urinary infection  -relaxes the nervous system  -soothes tension and anxiety  -useful for treating colds and sinusitis  -good for bronchitis and asthma    Eat plenty of alkaline foods,  Eat essential fatty acids-refined vegetable and seed oils, nuts, seeds, beans, fatty fish, protein, Evening Primrose Oil 500 mg twice per day, vitamin E 400 IU per day, Vitamin A 4,000 IU per day, Vitamin C 500-1000 mg per day.  Prepared by Apolonia Kinney CNM          Follow-ups after your visit        Who to contact     If you have questions or need follow up information about today's clinic visit or your schedule please contact [facility] [place] at [phone].  Normal or non-critical lab and imaging results will be communicated to you by  "MyChart, letter or phone within 4 business days after the clinic has received the results. If you do not hear from us within 7 days, please contact the clinic through [employer] or phone. If you have a critical or abnormal lab result, we will notify you by phone as soon as possible.  Submit refill requests through [employer] or call your pharmacy and they will forward the refill request to us. Please allow 3 business days for your refill to be completed.          Additional Information About Your Visit        Ethical [employer] Information     [employer] gives you secure access to your electronic health record. If you see a primary care provider, you can also send messages to your care team and make appointments. If you have questions, please call your primary care clinic.  If you do not have a primary care provider, please call [phone] and they will assist you.        Care EveryWhere ID     This is your Care EveryWhere ID. This could be used by other organizations to access your [place] medical records  [id number]        Your Vitals Were     Pulse Temperature Height Last Period Pulse Oximetry Breastfeeding?    73 97.5  F (36.4  C) (Oral) 5' 3\" (1.6 m) 09/28/2018 99% No    BMI (Body Mass Index)                   38.53 kg/m2            Blood Pressure from Last 3 Encounters:   10/01/18 129/90   04/11/18 (!) 131/93   12/14/17 127/84    Weight from Last 3 Encounters:   10/01/18 217 lb 8 oz (98.7 kg)   04/11/18 215 lb (97.5 kg)   12/14/17 204 lb 14.4 oz (92.9 kg)              Today, you had the following     No orders found for display         Today's Medication Changes          These changes are accurate as of 10/1/18  3:29 PM.  If you have any questions, ask your nurse or doctor.               Stop taking these medicines if you haven't already. Please contact your care team if you have questions.     levonorgestrel 20 MCG/24HR IUD   Commonly known as:  MIRENA   Stopped by:  Roz Dewey APRN CNM                    Primary " Care Provider Office Phone # Fax #    Soumya Castellon [phone] [address] [address]        Equal Access to Services     JAYNE PHELPS : Hadii aad ku hadjoano Soomaali, waaxda luqadaha, qaybta kaalmada adeegyada, daphne fernandezn guadalupe hernandez laNeosafia scruggs. So [facility] [phone].    ATENCIÓN: Si habla español, tiene a gonzalez disposición servicios gratuitos de asistencia lingüística. Llame al [phone].    We comply with applicable federal civil rights laws and [place] laws. We do not discriminate on the basis of race, color, national origin, age, disability, sex, sexual orientation, or gender identity.            Thank you!     Thank you for [place] [facility]  [place]. Our goal is always to provide you with excellent care. Hearing back from our patients is one way we can continue to improve our services. Please take a few minutes to complete the written survey that you may receive in the mail after your visit with us. Thank you!             Your Updated Medication List - Protect others around you: Learn how to safely use, store and throw away your medicines at www.disposemymeds.org.          This list is accurate as of 10/1/18  3:29 PM.  Always use your most recent med list.                   Brand Name Dispense Instructions for use Diagnosis    CO Q 10 PO           MELATONIN PO           PRENATAL VITAMIN PO           VITAMIN B COMPLEX PO

## 2018-10-01 NOTE — PATIENT INSTRUCTIONS
Cycle instructions:    The first day of bleeding/period is called Day 1.    Start testing for ovulation using the store bought ovulation predictor kits on Day 8 - 10 of cycle.  When you get a positive surge, you will most likely be ovulating within the next 24 hours.       Test the urine about the same time each day.  (should try to test between 2-4 pm, empty bladder about noon)         The test is positive when you see 2 dark solid lines.  (one faint line and one dark line is NOT positive)       Once the test is positive, you can stop testing.  Have sex/intercourse the day the test is positive.  The next day, have sex again.    If you don't have a period by 15-16 days after LH kit is positive (surge) then do urine pregnancy test and call clinic if positive.      Basic info:  The ovulation predictor kits are found in the condom/tampon section of the store.  Clear blue easy brand is simple to read.  Most women will get a positive LH surge before day 20 of the cycle.    Do not use lubercants with intercourse when trying to get pregnant.    Another sign of ovulation is ovulation discharge.  That is when the woman get a specific type of vaginal discharge that is the consistency of an egg white, it is slippery and yet stays in a clump.      When trying to conceive it is important that there are enough sperm in the one act of intercourse.  Try not to have intercourse and/or masturbate more than once a day.  The sperm as a group push each other up through the vagina, cervix, uterus and into the fallopian tubes and the egg.    If you have any questions please call us at [phone].    Roz Dewey       Infertility and Pregnancy  Teas  RASPBERRY LEAVES    -relaxing and toning for uterus  -help to strengthen and tone the uterine and pelvic muscles  -tones the mucous membranes throughout the body, soothe the kidneys, and urinary tract,  -stops diarrhea  -stop hemorrhage  -reduces pain of childbirth  -stimulates milk  supply  -makes contractions more effective and productive  -easing and shortening childbirth  -best taken as warm infusion (a strong tea)  -one to three times per day    NETTLE    -highly nutritious  -high in Vitamins and minerals: especially iron, silica and potassium  -nourishes weakness  -helps alleviate anemia  -stimulating effects on bladder and kidneys  -relieves fluid retention, bladder infection  -stimulate milk production  -stops heavy periods, brings on delayed periods  -helps relieve hayfever, asthma  -helps alleviate diarrhea  -reduce blood sugar    SAW PALMETTO    -Enhance digestion  -improve strength and vitality  -nourishing and tonic effects for the reproductive system  -to assist infertility  -regulates menstrual cycle  -inflammatory conditions such as salpingitis (inflammation of fallopian tubes)  -relieves ovarian pain  -relieves urinary infection  -relaxes the nervous system  -soothes tension and anxiety  -useful for treating colds and sinusitis  -good for bronchitis and asthma    Eat plenty of alkaline foods,  Eat essential fatty acids-refined vegetable and seed oils, nuts, seeds, beans, fatty fish, protein, Evening Primrose Oil 500 mg twice per day, vitamin E 400 IU per day, Vitamin A 4,000 IU per day, Vitamin C 500-1000 mg per day.  Prepared by Apolonia Kinney CNM

## 2018-10-01 NOTE — PROGRESS NOTES
"Chief Complaint   Patient presents with     Fertility       Initial /90 (BP Location: Left arm, Patient Position: Sitting, Cuff Size: Adult Large)  Pulse 73  Temp 97.5  F (36.4  C) (Oral)  Ht 5' 3\" (1.6 m)  Wt 217 lb 8 oz (98.7 kg)  LMP 2018  SpO2 99%  Breastfeeding? No  BMI 38.53 kg/m2 Estimated body mass index is 38.53 kg/(m^2) as calculated from the following:    Height as of this encounter: 5' 3\" (1.6 m).    Weight as of this encounter: 217 lb 8 oz (98.7 kg).  BP completed using cuff size: large        The following HM Due: NONE      The following patient reported/Care Every where data was sent to:  P ABSTRACT QUALITY INITIATIVES [[id number]]  n/a      n/a and patient has appointment for today              "

## 2019-01-10 ENCOUNTER — OFFICE VISIT (OUTPATIENT)
Dept: OBGYN | Facility: CLINIC | Age: 29
End: 2019-01-10
Payer: COMMERCIAL

## 2019-01-10 VITALS
TEMPERATURE: 98.4 F | SYSTOLIC BLOOD PRESSURE: 138 MMHG | DIASTOLIC BLOOD PRESSURE: 90 MMHG | HEART RATE: 98 BPM | BODY MASS INDEX: 40.39 KG/M2 | WEIGHT: 228 LBS

## 2019-01-10 DIAGNOSIS — N92.6 IRREGULAR MENSES: Primary | ICD-10-CM

## 2019-01-10 LAB
ESTRADIOL SERPL-MCNC: 54 PG/ML
FSH SERPL-ACNC: 5.4 IU/L
LH SERPL-ACNC: 2.3 IU/L
PROLACTIN SERPL-MCNC: 12 UG/L (ref 3–27)

## 2019-01-10 PROCEDURE — 36415 COLL VENOUS BLD VENIPUNCTURE: CPT | Performed by: OBSTETRICS & GYNECOLOGY

## 2019-01-10 PROCEDURE — 82670 ASSAY OF TOTAL ESTRADIOL: CPT | Performed by: OBSTETRICS & GYNECOLOGY

## 2019-01-10 PROCEDURE — 83520 IMMUNOASSAY QUANT NOS NONAB: CPT | Mod: 90 | Performed by: OBSTETRICS & GYNECOLOGY

## 2019-01-10 PROCEDURE — 99214 OFFICE O/P EST MOD 30 MIN: CPT | Performed by: OBSTETRICS & GYNECOLOGY

## 2019-01-10 PROCEDURE — 99000 SPECIMEN HANDLING OFFICE-LAB: CPT | Performed by: OBSTETRICS & GYNECOLOGY

## 2019-01-10 PROCEDURE — 83002 ASSAY OF GONADOTROPIN (LH): CPT | Performed by: OBSTETRICS & GYNECOLOGY

## 2019-01-10 PROCEDURE — 84146 ASSAY OF PROLACTIN: CPT | Performed by: OBSTETRICS & GYNECOLOGY

## 2019-01-10 PROCEDURE — 83001 ASSAY OF GONADOTROPIN (FSH): CPT | Performed by: OBSTETRICS & GYNECOLOGY

## 2019-01-10 PROCEDURE — 84443 ASSAY THYROID STIM HORMONE: CPT | Performed by: OBSTETRICS & GYNECOLOGY

## 2019-01-10 RX ORDER — CLOMIPHENE CITRATE 50 MG/1
TABLET ORAL
Qty: 10 TABLET | Refills: 0 | Status: SHIPPED | OUTPATIENT
Start: 2019-01-10 | End: 2019-04-18

## 2019-01-10 NOTE — PATIENT INSTRUCTIONS
Cycle instructions:    The first day of bleeding/period is called Day 1.    Clomid is taken Days 3-7 of cycle, about same time each day.  Take the medication even if you are still bleeding.    Call when you get your period to schedule an ultrasound to check for follicles on your ovaries.  This should be done about Day 12-14 of cycle.  (we do not have ultrasound on the weekends)  The phone number is [phone] and listen to prompts for surgery and ultrasound .    Start testing for ovulation using the store bought ovulation predictor kits on Day 11 of cycle.  When you get a positive surge, you will most likely be ovulating within the next 24 hours.       Test the urine about the same time each day.  (should try to test between 2-4 pm, empty bladder about noon)         The test is positive when you see 2 dark solid lines, or a smiley face.  (one faint line and one dark line is NOT positive)         Once the test is positive, you can stop testing.  Have sex/intercourse the day the test is positive.  The next day, have sex again.    Schedule a lab only appointment for about 7-9 days after your ovulation test is positive to check the progesterone level and make sure ovulation occurred.    Make appointment to come back to clinic for infertility follow-up visit about Day 24-26 of cycle so we can review all of the labs and ultrasound results and make dose adjustments if needed.     You can also do an E Visit if you are a my chart patient.   Start the visit by telling me your LMP (last menstrual period), any side effects of clomid and cycle day of your LH surge.    If you don't have a period by 15 days after LH kit is positive (surge) then do urine pregnancy test and call clinic if positive.      Basic info:  The ovulation predictor kits are found in the condom/tampon section of the store.  Clear blue easy brand is simple to read.  Most women will get a positive LH surge before day 20 of the cycle.  On clomid, some  people may notice nausea, dizziness, bloating, headaches and hot flashes. Most people take the medication at night to minimize side effects.   There is a chance of twins when taking clomid as well as making too many cysts on the ovaries.  Do not use lubricants with intercourse when trying to get pregnant. (Pre-Seed, etc is okay)    Schedule an ultrasound for about day 12-14 of the cycle and see if you have a 2 cm follicle on the ovary and about 9 mm endometrium.  Typically follicles grow 2 mm/day so we can  when ovulation should be happening/ready based on the ultrasound and sometimes may need a repeat ultrasound done a few days after last ultrasound to confirm this is happening.  IF it looks like you should be ovulating but have not surged on your own (+OPK), would do an HCG bump to trigger ovulation.      If we do HCG bump, recommend nightly prometrium 200 mg by mouth to support luteal phase.  If no menses 15 days from HCG shot, do pregnancy test and call/email if positive.  If you are pregnant we will continue prometrium until about 10 weeks along.   We will also schedule an early ultrasound for about 7-8 weeks.   DO NOT do pregnancy test before 2 weeks after the HCG shot as it will often be a false positive.    If pregnancy test is negative, then stop prometrium.

## 2019-01-10 NOTE — NURSING NOTE
"Chief Complaint   Patient presents with     Fertility       Initial /90   Pulse 98   Temp 98.4  F (36.9  C) (Oral)   Wt 103.4 kg (228 lb)   BMI 40.39 kg/m   Estimated body mass index is 40.39 kg/m  as calculated from the following:    Height as of 10/1/18: 1.6 m (5' 3\").    Weight as of this encounter: 103.4 kg (228 lb).  BP completed using cuff size: large    Questioned patient about current smoking habits.  Pt. has never smoked.          The following HM Due: NONE      The following patient reported/Care Every where data was sent to:  P ABSTRACT QUALITY INITIATIVES [[id number]]  Na        n/a              "

## 2019-01-11 LAB — MIS SERPL-MCNC: 0.88 NG/ML (ref 0.4–16.02)

## 2019-01-11 NOTE — PROGRESS NOTES
CC: getting pregnant  HPI: Liyah Rodriguez is a 28 year old female Patient's last menstrual period was 01/08/2019. Menses are regular q 25-27 days and had IUD removed in April 2018, trying to get pregnant since then.    Has done ovulation predictor kits and 50-75% of the time will see a positive result.  Has seen this as early is day 10 and as late as day 17.  Feels like her luteal phase is 10-11 days long.  Has also done basal body temperatures and usually sees an ovulation pattern but has 2 months where it was very questionable.    The patient has been trying to conceive for almost 12 cycles per patient.    Spouse did have semen analysis done.  Otherwise no other labs or tests have been done.         Prior pregnancy history is as follows: None    Allergies: Nkda [no known drug allergies]                    Past Medical History:   Diagnosis Date     Contraception     depo for a few yrs - to Mirena in 11/13       Past Surgical History:   Procedure Laterality Date     ACL repair  06/2008     Right lateral menisectomy  06/2008           Social History     Tobacco Use     Smoking status: Never Smoker     Smokeless tobacco: Never Used   Substance Use Topics     Alcohol use: Yes     Comment: occ              Family History   Problem Relation Age of Onset     Heart Disease Paternal Grandmother         CHF     Hypertension Mother      Hypertension Maternal Grandmother      High cholesterol Maternal Grandmother      Cancer Maternal Grandfather         lung cancer     Cancer Father 50        lymphoma     Aneurysm Father 63        spotty vision, occasional weakness prior- then sudden death, smoker       Current Outpatient Medications   Medication Sig Dispense Refill     B Complex Vitamins (VITAMIN B COMPLEX PO)        clomiPHENE (CLOMID) 50 MG tablet 2 tablets days 3-7 of cycle 10 tablet 0     Coenzyme Q10 (CO Q 10 PO)        MELATONIN PO        Prenatal Vit-Fe Fumarate-FA (PRENATAL VITAMIN PO)          Past GYN  history:  No STD history  History of abnormal PAP: No  History of surgery to cervix: No  PID History: No  History of  IUD use: Yes, removed in April  Use of lubercants with intercourse: No    PAST INFERTILITY EVALUATION AND TREATMENT:  Patient's work up has included:  None    Hormonal evaluation has included:   Not checked    Past infertility treatment included none.    Partner is 28 years old. No history of trauma to the genitalia, tobacco, drug use.  No prior children.  He takes Propecia, lisinopril and Synthroid.    Had semen analysis done and report is with her today on her phone.  Total number was 139 million with 4% normal forms.    EXAM:  Blood pressure 138/90, pulse 98, temperature 98.4  F (36.9  C), temperature source Oral, weight 103.4 kg (228 lb), last menstrual period 01/08/2019, not currently breastfeeding.  BMI= Body mass index is 40.39 kg/m .  Patient's last menstrual period was 01/08/2019.  General - pleasant female in no acute distress.  Neurological - alert and oriented X 3  Psychiatric - normal mood and affect    No other physical examination was performed today as we spent over 50% of today's 30 minute visit in face-to-face discussion and counseling about getting pregnant.    ASSESSMENT/ PLAN:  (N92.6) Irregular menses  (primary encounter diagnosis)  Comment: Cycle day 3  Plan: Follicle stimulating hormone, Lutropin, TSH         with free T4 reflex, Prolactin, Estradiol,         Anti-Mullerian hormone, clomiPHENE (CLOMID) 50         MG tablet, US Pelvic Complete with Transvaginal        We will check normal cycle day 3 labs today.  Discussed shortened luteal phase and option of supplemental progesterone versus trial of ovulation induction medication to fix the luteal phase.  She prefers the latter and we will do a trial of Clomid with the cycle.    Clomid use was discussed including how to take it days 3-7 of cycle, risks including specifically possibility of twin pregnancy, ovarian  hyperstimulation, bloating, and hot flashes.  The patient's questions were answered. Will plan a follicle ultrasound check for about day 14 of cycle. (day 12 and 13 are weekend this month)  Patient was also instructed on how to check ovulation predictor kits during this time and how to time coital coverage for maximal pregnancy chances. HCG bump was also discussed.    If not pregnant within 3 cycles, discussed need for HSG and procedure reviewed at today's visit.  Also discussed possible need for letrozole if does not respond to Clomid.  Questions were answered.      Yvonne Reese MD

## 2019-01-12 LAB — TSH SERPL DL<=0.005 MIU/L-ACNC: 3.19 MU/L (ref 0.4–4)

## 2019-01-13 ENCOUNTER — E-VISIT (OUTPATIENT)
Dept: OBGYN | Facility: CLINIC | Age: 29
End: 2019-01-13
Payer: COMMERCIAL

## 2019-01-13 ENCOUNTER — NURSE TRIAGE (OUTPATIENT)
Dept: NURSING | Facility: CLINIC | Age: 29
End: 2019-01-13

## 2019-01-13 DIAGNOSIS — Z53.9 ERRONEOUS ENCOUNTER--DISREGARD: Primary | ICD-10-CM

## 2019-01-13 NOTE — TELEPHONE ENCOUNTER
Patient is going to send a message via [employer] to her MD to see if she's available now.  Yudith Voss RN-[facility]

## 2019-01-17 DIAGNOSIS — N92.6 IRREGULAR MENSES: ICD-10-CM

## 2019-01-17 LAB — FSH SERPL-ACNC: 4.3 IU/L

## 2019-01-17 PROCEDURE — 36415 COLL VENOUS BLD VENIPUNCTURE: CPT | Performed by: OBSTETRICS & GYNECOLOGY

## 2019-01-17 PROCEDURE — 83001 ASSAY OF GONADOTROPIN (FSH): CPT | Performed by: OBSTETRICS & GYNECOLOGY

## 2019-01-23 ENCOUNTER — TELEPHONE (OUTPATIENT)
Dept: OBGYN | Facility: CLINIC | Age: 29
End: 2019-01-23

## 2019-01-23 DIAGNOSIS — N92.6 IRREGULAR MENSES: Primary | ICD-10-CM

## 2019-01-28 DIAGNOSIS — N92.6 IRREGULAR MENSES: ICD-10-CM

## 2019-01-28 LAB — PROGEST SERPL-MCNC: 5.9 NG/ML

## 2019-01-28 PROCEDURE — 84144 ASSAY OF PROGESTERONE: CPT | Performed by: OBSTETRICS & GYNECOLOGY

## 2019-01-28 PROCEDURE — 36415 COLL VENOUS BLD VENIPUNCTURE: CPT | Performed by: OBSTETRICS & GYNECOLOGY

## 2019-01-29 ENCOUNTER — E-VISIT (OUTPATIENT)
Dept: OBGYN | Facility: CLINIC | Age: 29
End: 2019-01-29
Payer: COMMERCIAL

## 2019-01-29 DIAGNOSIS — N92.6 IRREGULAR MENSES: Primary | ICD-10-CM

## 2019-01-29 PROCEDURE — 99444 ZZC PHYSICIAN ONLINE EVALUATION & MANAGEMENT SERVICE: CPT | Performed by: OBSTETRICS & GYNECOLOGY

## 2019-01-31 ENCOUNTER — TELEPHONE (OUTPATIENT)
Dept: OBGYN | Facility: CLINIC | Age: 29
End: 2019-01-31

## 2019-01-31 DIAGNOSIS — Z31.49 PROCREATION MANAGEMENT INVESTIGATION AND TESTING: Primary | ICD-10-CM

## 2019-01-31 NOTE — TELEPHONE ENCOUNTER
Today is CD1 and pt is calling to get started with meds for the next cycle.  Had +OPK on CD12 and had progesterone checked on day 21 (5.9).  Took 50mg clomid last cycle.  Do you want to re-order that for her to take for this upcoming cycle?  Pharmacy is noted.    Pt is also wondering about doing an HSG this cycle.  She's off work next Friday, Feb 8th and would like to do it then if it's recommended.  She's aware that Dr. Reese is out until next week and she may be the one who orders the HSG.  Amanda Kamara, RN

## 2019-02-01 DIAGNOSIS — Z31.41 FERTILITY TESTING: ICD-10-CM

## 2019-02-01 DIAGNOSIS — Z01.812 PRE-PROCEDURE LAB EXAM: Primary | ICD-10-CM

## 2019-02-01 RX ORDER — CLOMIPHENE CITRATE 50 MG/1
TABLET ORAL
Qty: 10 TABLET | Refills: 0 | Status: SHIPPED | OUTPATIENT
Start: 2019-02-01 | End: 2019-04-18

## 2019-02-01 RX ORDER — CLOMIPHENE CITRATE 50 MG/1
TABLET ORAL
Qty: 5 TABLET | Refills: 0 | Status: SHIPPED | OUTPATIENT
Start: 2019-02-01 | End: 2019-02-01

## 2019-02-01 NOTE — TELEPHONE ENCOUNTER
If patient gets a period and neg UPT, okay for clomid and HSG next cycle.   Sent rx refill of clomid 50mg with instructions to take on days 3-7 of cycle.  Ordered HSG. She can call Krista [phone] to schedule but cancel if no menses/pos UPT.  Thanks,  Mari Garcia MD

## 2019-02-08 ENCOUNTER — HOSPITAL ENCOUNTER (OUTPATIENT)
Dept: GENERAL RADIOLOGY | Facility: CLINIC | Age: 29
Discharge: HOME | End: 2019-02-08
Attending: OBSTETRICS & GYNECOLOGY | Admitting: OBSTETRICS & GYNECOLOGY
Payer: COMMERCIAL

## 2019-02-08 DIAGNOSIS — Z31.49 PROCREATION MANAGEMENT INVESTIGATION AND TESTING: ICD-10-CM

## 2019-02-08 DIAGNOSIS — Z01.812 PRE-PROCEDURE LAB EXAM: ICD-10-CM

## 2019-02-08 DIAGNOSIS — Z31.41 FERTILITY TESTING: ICD-10-CM

## 2019-02-08 LAB — BETA HCG QUAL IFA URINE: NEGATIVE

## 2019-02-08 PROCEDURE — 58340 CATHETER FOR HYSTEROGRAPHY: CPT | Performed by: OBSTETRICS & GYNECOLOGY

## 2019-02-08 PROCEDURE — 25500064 ZZH RX 255 OP 636: Performed by: OBSTETRICS & GYNECOLOGY

## 2019-02-08 PROCEDURE — 84703 CHORIONIC GONADOTROPIN ASSAY: CPT | Performed by: OBSTETRICS & GYNECOLOGY

## 2019-02-08 PROCEDURE — 25000125 ZZHC RX 250: Performed by: OBSTETRICS & GYNECOLOGY

## 2019-02-08 PROCEDURE — 74740 X-RAY FEMALE GENITAL TRACT: CPT

## 2019-02-08 RX ORDER — IOPAMIDOL 510 MG/ML
50 INJECTION, SOLUTION INTRAVASCULAR ONCE
Status: COMPLETED | OUTPATIENT
Start: 2019-02-08 | End: 2019-02-08

## 2019-02-08 RX ADMIN — IOPAMIDOL 5 ML: 510 INJECTION, SOLUTION INTRAVASCULAR at 10:17

## 2019-02-08 RX ADMIN — LIDOCAINE HYDROCHLORIDE 3 ML: 10 INJECTION, SOLUTION EPIDURAL; INFILTRATION; INTRACAUDAL; PERINEURAL at 10:17

## 2019-02-08 NOTE — PROCEDURES
HSG done in the radiology department at [facility].    The patient is seen today for a hysterosalpingogram to discern tubal patency.     Procedure:  After verbal informed consent was obtained, the patient was placed in dorsal lithotomy on the fluoroscopy table. A speculum was placed in the vagina and the cervix was prepped with chlorhexidine. 1% lidocaine was put into the anterior cervical lip. A tenaculum was placed on the anterior lip of the cervix.    A [employer] cannula was placed into the cervical os.      Under fluoroscopy, the Isovue dye was injected.    The radiologic findings will be documented by the interpreting radiologist.    The cannula and tenaculum were removed.   The cervix was made hemostatic with silver nitrate and pressure,  minimal EBL.  The patient tolerated the procedure well and was discharged to home.     Yvonne Reese MD

## 2019-02-12 ENCOUNTER — ALLIED HEALTH/NURSE VISIT (OUTPATIENT)
Dept: NURSING | Facility: CLINIC | Age: 29
End: 2019-02-12
Payer: COMMERCIAL

## 2019-02-12 ENCOUNTER — ANCILLARY PROCEDURE (OUTPATIENT)
Dept: ULTRASOUND IMAGING | Facility: CLINIC | Age: 29
End: 2019-02-12
Attending: OBSTETRICS & GYNECOLOGY
Payer: COMMERCIAL

## 2019-02-12 DIAGNOSIS — N92.6 IRREGULAR MENSES: ICD-10-CM

## 2019-02-12 DIAGNOSIS — N92.6 IRREGULAR MENSES: Primary | ICD-10-CM

## 2019-02-12 PROCEDURE — 76830 TRANSVAGINAL US NON-OB: CPT | Performed by: OBSTETRICS & GYNECOLOGY

## 2019-02-12 PROCEDURE — 96372 THER/PROPH/DIAG INJ SC/IM: CPT

## 2019-02-12 RX ORDER — CHORIONIC GONADOTROPIN 10000 UNIT
10000 KIT INTRAMUSCULAR ONCE
Status: COMPLETED | OUTPATIENT
Start: 2019-02-12 | End: 2019-02-12

## 2019-02-12 RX ADMIN — CHORIONIC GONADOTROPIN 10000 UNITS: KIT at 08:45

## 2019-02-12 NOTE — NURSING NOTE
The following medication was given:     MEDICATION: Pregnyl  ROUTE: IM  SITE: [facility]  DOSE: 10,000 units  LOT #: [id number]  :  [employer]  EXPIRATION DATE:  01/05/2020  NDC#: 1267-7801-17  Sheree Baez

## 2019-02-26 ENCOUNTER — TELEPHONE (OUTPATIENT)
Dept: OBGYN | Facility: CLINIC | Age: 29
End: 2019-02-26

## 2019-02-26 ENCOUNTER — E-VISIT (OUTPATIENT)
Dept: OBGYN | Facility: CLINIC | Age: 29
End: 2019-02-26
Payer: COMMERCIAL

## 2019-02-26 DIAGNOSIS — N92.6 IRREGULAR MENSES: Primary | ICD-10-CM

## 2019-02-26 PROCEDURE — 99444 ZZC PHYSICIAN ONLINE EVALUATION & MANAGEMENT SERVICE: CPT | Performed by: OBSTETRICS & GYNECOLOGY

## 2019-02-26 RX ORDER — LETROZOLE 2.5 MG/1
TABLET, FILM COATED ORAL
Qty: 10 TABLET | Refills: 0 | Status: SHIPPED | OUTPATIENT
Start: 2019-02-26 | End: 2019-04-18

## 2019-02-26 NOTE — TELEPHONE ENCOUNTER
Patient calling to requesting change of her medication from Clomid to something else? Pharmacy teed up.     Patient is also wondering if she should have a referral to [facility] at this point or wait a couple more cycles?  Sheree Baez

## 2019-03-05 ENCOUNTER — TRANSFERRED RECORDS (OUTPATIENT)
Dept: HEALTH INFORMATION MANAGEMENT | Facility: CLINIC | Age: 29
End: 2019-03-05

## 2019-03-08 ENCOUNTER — E-VISIT (OUTPATIENT)
Dept: OBGYN | Facility: CLINIC | Age: 29
End: 2019-03-08
Payer: COMMERCIAL

## 2019-03-08 ENCOUNTER — ANCILLARY PROCEDURE (OUTPATIENT)
Dept: ULTRASOUND IMAGING | Facility: CLINIC | Age: 29
End: 2019-03-08
Attending: OBSTETRICS & GYNECOLOGY
Payer: COMMERCIAL

## 2019-03-08 DIAGNOSIS — N92.6 IRREGULAR MENSES: Primary | ICD-10-CM

## 2019-03-08 DIAGNOSIS — N92.6 IRREGULAR MENSES: ICD-10-CM

## 2019-03-08 PROCEDURE — 76830 TRANSVAGINAL US NON-OB: CPT | Performed by: OBSTETRICS & GYNECOLOGY

## 2019-03-08 PROCEDURE — 99444 ZZC PHYSICIAN ONLINE EVALUATION & MANAGEMENT SERVICE: CPT | Performed by: OBSTETRICS & GYNECOLOGY

## 2019-03-19 ENCOUNTER — ALLIED HEALTH/NURSE VISIT (OUTPATIENT)
Dept: NURSING | Facility: CLINIC | Age: 29
End: 2019-03-19
Payer: COMMERCIAL

## 2019-03-19 VITALS — DIASTOLIC BLOOD PRESSURE: 82 MMHG | SYSTOLIC BLOOD PRESSURE: 120 MMHG | WEIGHT: 219.6 LBS | BODY MASS INDEX: 38.9 KG/M2

## 2019-03-19 DIAGNOSIS — Z01.30 BLOOD PRESSURE CHECK: Primary | ICD-10-CM

## 2019-03-19 PROCEDURE — 99207 ZZC NO CHARGE NURSE ONLY: CPT

## 2019-03-25 ENCOUNTER — TELEPHONE (OUTPATIENT)
Dept: OBGYN | Facility: CLINIC | Age: 29
End: 2019-03-25

## 2019-03-25 DIAGNOSIS — Z34.90 EARLY STAGE OF PREGNANCY: Primary | ICD-10-CM

## 2019-03-25 NOTE — TELEPHONE ENCOUNTER
Patient calling and set up nurse intake and new prenatal visit with [facility]. LMP 2/26 - 3w6d. No bleeding or cramping. Patient seeing [facility] for fertility - okay to order serial beta and early US? Thanks!  Sheree Baez

## 2019-03-25 NOTE — TELEPHONE ENCOUNTER
Yes ok to order  Did she follow instructions for when to test after HCG trigger? I feel like 3 w 6 d is a little early.

## 2019-03-27 DIAGNOSIS — Z34.90 EARLY STAGE OF PREGNANCY: ICD-10-CM

## 2019-03-27 LAB — B-HCG SERPL-ACNC: 402 IU/L (ref 0–5)

## 2019-03-27 PROCEDURE — 84702 CHORIONIC GONADOTROPIN TEST: CPT | Performed by: OBSTETRICS & GYNECOLOGY

## 2019-03-27 PROCEDURE — 36415 COLL VENOUS BLD VENIPUNCTURE: CPT | Performed by: OBSTETRICS & GYNECOLOGY

## 2019-03-29 DIAGNOSIS — Z34.90 EARLY STAGE OF PREGNANCY: ICD-10-CM

## 2019-03-29 LAB — B-HCG SERPL-ACNC: 1046 IU/L (ref 0–5)

## 2019-03-29 PROCEDURE — 84702 CHORIONIC GONADOTROPIN TEST: CPT | Performed by: OBSTETRICS & GYNECOLOGY

## 2019-03-29 PROCEDURE — 36415 COLL VENOUS BLD VENIPUNCTURE: CPT | Performed by: OBSTETRICS & GYNECOLOGY

## 2019-04-01 ENCOUNTER — E-VISIT (OUTPATIENT)
Dept: OBGYN | Facility: CLINIC | Age: 29
End: 2019-04-01
Payer: COMMERCIAL

## 2019-04-01 DIAGNOSIS — Z32.01 PREGNANCY TEST POSITIVE: Primary | ICD-10-CM

## 2019-04-01 PROCEDURE — 99444 ZZC PHYSICIAN ONLINE EVALUATION & MANAGEMENT SERVICE: CPT | Performed by: OBSTETRICS & GYNECOLOGY

## 2019-04-09 ENCOUNTER — TELEPHONE (OUTPATIENT)
Dept: OBGYN | Facility: CLINIC | Age: 29
End: 2019-04-09

## 2019-04-09 DIAGNOSIS — Z32.01 POSITIVE PREGNANCY TEST: ICD-10-CM

## 2019-04-09 DIAGNOSIS — Z32.01 POSITIVE PREGNANCY TEST: Primary | ICD-10-CM

## 2019-04-09 LAB — B-HCG SERPL-ACNC: ABNORMAL IU/L (ref 0–5)

## 2019-04-09 PROCEDURE — 84702 CHORIONIC GONADOTROPIN TEST: CPT | Performed by: OBSTETRICS & GYNECOLOGY

## 2019-04-09 PROCEDURE — 36415 COLL VENOUS BLD VENIPUNCTURE: CPT | Performed by: OBSTETRICS & GYNECOLOGY

## 2019-04-09 NOTE — TELEPHONE ENCOUNTER
Yes, should make a lab only for another quantitative hCG and we might want to repeat it versus just wait for the ultrasound.  (u/s ordered 4/18)    Thanks  Yvonne Reese MD

## 2019-04-09 NOTE — TELEPHONE ENCOUNTER
Patient is pregnant. LMP 2/26/19,  6w0d. Has had a little bit of spotting, brown discharge. Yesterday when wiping had some bright red blood and clots come out. Pt was straining with bowel movement. Discussed with patient spotting could have been from straining. Discussed with patient monitoring and when to present to ER for possible miscarriage. Pt wondering if she needs to be seen. Routing to [facility]. Do you want her to come in for another quant?   Danae Mota, LATONIA-BSN

## 2019-04-09 NOTE — TELEPHONE ENCOUNTER
TC to patient. Discussed recommendation. Scheduled lab only appt for today and Thurs. Advised pt to call back with any changes in symptoms, increase in bleeding, etc. Pt stated understanding.   Danae Mota RN-BSN

## 2019-04-18 ENCOUNTER — TRANSCRIBE ORDERS (OUTPATIENT)
Dept: MATERNAL FETAL MEDICINE | Facility: CLINIC | Age: 29
End: 2019-04-18

## 2019-04-18 ENCOUNTER — ANCILLARY PROCEDURE (OUTPATIENT)
Dept: ULTRASOUND IMAGING | Facility: CLINIC | Age: 29
End: 2019-04-18
Attending: OBSTETRICS & GYNECOLOGY
Payer: COMMERCIAL

## 2019-04-18 ENCOUNTER — PRENATAL OFFICE VISIT (OUTPATIENT)
Dept: NURSING | Facility: CLINIC | Age: 29
End: 2019-04-18
Payer: COMMERCIAL

## 2019-04-18 VITALS
BODY MASS INDEX: 38.27 KG/M2 | DIASTOLIC BLOOD PRESSURE: 84 MMHG | WEIGHT: 216 LBS | SYSTOLIC BLOOD PRESSURE: 132 MMHG | HEIGHT: 63 IN | HEART RATE: 98 BPM | TEMPERATURE: 98.6 F

## 2019-04-18 DIAGNOSIS — O26.90 PREGNANCY RELATED CONDITION, ANTEPARTUM: Primary | ICD-10-CM

## 2019-04-18 DIAGNOSIS — Z23 NEED FOR TDAP VACCINATION: ICD-10-CM

## 2019-04-18 DIAGNOSIS — Z34.00 SUPERVISION OF NORMAL FIRST PREGNANCY: Primary | ICD-10-CM

## 2019-04-18 DIAGNOSIS — Z32.01 PREGNANCY TEST POSITIVE: ICD-10-CM

## 2019-04-18 LAB
ABO + RH BLD: NORMAL
ABO + RH BLD: NORMAL
ALBUMIN UR-MCNC: NEGATIVE MG/DL
APPEARANCE UR: CLEAR
BILIRUB UR QL STRIP: NEGATIVE
BLD GP AB SCN SERPL QL: NORMAL
BLOOD BANK CMNT PATIENT-IMP: NORMAL
COLOR UR AUTO: YELLOW
ERYTHROCYTE [DISTWIDTH] IN BLOOD BY AUTOMATED COUNT: 12.7 % (ref 10–15)
GLUCOSE UR STRIP-MCNC: NEGATIVE MG/DL
HCT VFR BLD AUTO: 39 % (ref 35–47)
HGB BLD-MCNC: 13.6 G/DL (ref 11.7–15.7)
HGB UR QL STRIP: ABNORMAL
KETONES UR STRIP-MCNC: NEGATIVE MG/DL
LEUKOCYTE ESTERASE UR QL STRIP: NEGATIVE
MCH RBC QN AUTO: 30.1 PG (ref 26.5–33)
MCHC RBC AUTO-ENTMCNC: 34.9 G/DL (ref 31.5–36.5)
MCV RBC AUTO: 86 FL (ref 78–100)
NITRATE UR QL: NEGATIVE
PH UR STRIP: 6 PH (ref 5–7)
PLATELET # BLD AUTO: 273 10E9/L (ref 150–450)
RBC # BLD AUTO: 4.52 10E12/L (ref 3.8–5.2)
SOURCE: ABNORMAL
SP GR UR STRIP: <=1.005 (ref 1–1.03)
SPECIMEN EXP DATE BLD: NORMAL
UROBILINOGEN UR STRIP-ACNC: 0.2 EU/DL (ref 0.2–1)
WBC # BLD AUTO: 12.9 10E9/L (ref 4–11)

## 2019-04-18 PROCEDURE — 87389 HIV-1 AG W/HIV-1&-2 AB AG IA: CPT | Performed by: OBSTETRICS & GYNECOLOGY

## 2019-04-18 PROCEDURE — 76817 TRANSVAGINAL US OBSTETRIC: CPT | Performed by: OBSTETRICS & GYNECOLOGY

## 2019-04-18 PROCEDURE — 36415 COLL VENOUS BLD VENIPUNCTURE: CPT | Performed by: OBSTETRICS & GYNECOLOGY

## 2019-04-18 PROCEDURE — 86762 RUBELLA ANTIBODY: CPT | Performed by: OBSTETRICS & GYNECOLOGY

## 2019-04-18 PROCEDURE — 86901 BLOOD TYPING SEROLOGIC RH(D): CPT | Performed by: OBSTETRICS & GYNECOLOGY

## 2019-04-18 PROCEDURE — 86900 BLOOD TYPING SEROLOGIC ABO: CPT | Performed by: OBSTETRICS & GYNECOLOGY

## 2019-04-18 PROCEDURE — 87340 HEPATITIS B SURFACE AG IA: CPT | Performed by: OBSTETRICS & GYNECOLOGY

## 2019-04-18 PROCEDURE — 87086 URINE CULTURE/COLONY COUNT: CPT | Performed by: OBSTETRICS & GYNECOLOGY

## 2019-04-18 PROCEDURE — 85027 COMPLETE CBC AUTOMATED: CPT | Performed by: OBSTETRICS & GYNECOLOGY

## 2019-04-18 PROCEDURE — 81003 URINALYSIS AUTO W/O SCOPE: CPT | Performed by: OBSTETRICS & GYNECOLOGY

## 2019-04-18 PROCEDURE — 99207 ZZC NO CHARGE NURSE ONLY: CPT

## 2019-04-18 PROCEDURE — 86850 RBC ANTIBODY SCREEN: CPT | Performed by: OBSTETRICS & GYNECOLOGY

## 2019-04-18 PROCEDURE — 0064U ANTB TP TOTAL&RPR IA QUAL: CPT | Performed by: OBSTETRICS & GYNECOLOGY

## 2019-04-18 RX ORDER — CHOLECALCIFEROL (VITAMIN D3) 50 MCG
1 TABLET ORAL DAILY
COMMUNITY
End: 2020-05-06

## 2019-04-18 ASSESSMENT — MIFFLIN-ST. JEOR: SCORE: 1678.9

## 2019-04-18 NOTE — PROGRESS NOTES
Patient presents for new ob teaching and labs, first pregnancy. Ultrasound performed today due to spotting, viable pregnancy. Ordered first trimester screening. Handouts  reviewed and given.  Patient has hypertension due to birth control, BP electronically was 139/92 , repeated manually 132/84. Has NOB with Dr Reese 5/24/19    Caffeine intake/servings daily - 0  Calcium intake/servings daily - 3  Exercise 5 times weekly - describe ; walks  Sunscreen used - Yes  Seatbelts used - Yes  Guns stored in the home - No  Self Breast Exam - Yes  Pap test up to date -  Yes  Eye exam up to date -  Yes  Dental exam up to date -  Yes  Immunizations reviewed and up to date - Yes  Abuse: Current or Past (Physical, Sexual or Emotional) - No  Do you feel safe in your environment - Yes  Do you cope well with stress - Yes  Do you suffer from insomnia - No         Patient supplied answers from flow sheet for:  Prenatal OB Questionnaire.  Past Medical History  Diabetes?: No  Hypertension : (!) Yes(related to birth control)  Heart disease, mitral valve prolapse or rheumatic fever?: No  An autoimmune disease such as lupus or rheumatoid arthritis?: No  Kidney disease or urinary tract infection?: No  Epilepsy, seizures or spells?: No  Migraine headaches?: No  A stroke or loss of function or sensation?: No  Any other neurological problems?: No  Have you ever been treated for depression?: No  Are you having problems with crying spells or loss of self-esteem?: No  Have you ever required psychiatric care?: No  Have you ever had hepatitis, liver disease or jaundice?: No  Have you been treated for blood clots in your veins, deep vein thrombosis, inflammation in the veins, thrombosis, phlebitis, pulmonary embolism or varicosities?: No  Have you had excessive bleeding after surgery or dental work?: No  Do you have a history of anemia?: No  Have you ever had thyroid problems or taken thyroid medication?: No   Do you have any endocrine problems?:  No  Have you ever been in a major accident or suffered serious trauma?: No  Within the last year, has anyone hit, slapped, kicked or otherwise hurt you?: No  In the last year, has anyone forced you to have sex when you didn't want to?: No    Past Medical History 2   Have you ever received a blood transfusion?: No  Would you refuse a blood transfusion if a doctor judged it to be medically necessary?: No   If you answered Yes, would you rather die than receive a blood transfusion?: No  If you answered Yes, is this for Amish reasons?: No  Does anyone in your home smoke?: No  Do you use tobacco products?: No  Do you drink beer, wine or hard liquor?: No  Do you use any of the following: marijuana, speed, cocaine, heroin, hallucinogens or other drugs?: No   Is your blood type Rh negative?: No  Have you ever had abnormal antibodies in your blood?: No  Have you ever had asthma?: No  Have you ever had tuberculosis?: No  Do you have any allergies to drugs or over-the-counter medications?: No  Allergies: Dust Mites, Aspartame, Ethanol, Venlafaxine, Hydrochloride, Sertraline: No  Have you had any breast problems?: No  Have you ever ?: No  Have you had any gynecological surgical procedures such as cervical conization, a LEEP procedure, laser treatment, cryosurgery of the cervix or a dilation and curettage, etc?: No  Have you ever had any other surgical procedures?: (!) Yes(ACL repair)  Have you been hospitalized for a nonsurgical reason excluding normal delivery?: No  Have you ever had any anesthetic complications?: No  Have you ever had an abnormal pap smear?: No    Past Medical History (Continued)  Do you have a history of abnormalities of the uterus?: No  Did your mother take RAÚL or any other hormones when she was pregnant with you?: No  Did it take you more than a year to become pregnant?: (!) Yes  Have you ever been evaluated or treated for infertility?: (!) YES  Is there a history of medical problems in your  family, which you feel may be important to this pregnancy?: No  Do you have any other problems we have not asked about which you feel may be important to this pregnancy?: No    Symptoms since last menstrual period  Do you have any of the following symptoms: abdominal pain, blood in stools or urine, chest pain, shortness of breath, coughing or vomiting up blood, your heart racing or skipping beats, nausea and vomiting, pain on urination or vaginal discharge or bleed: (!) Yes  Will the patient be 35 years old or older at the time of delivery?: No    Has the patient, baby's father or anyone in either family had:  Thalassemia (Italian, Greek, Mediterranean or  background only) and an MCV result less than 80?: No  Neural tube defect such as meningomyelocele, spina bifida or anencephaly?: No  Congenital heart defect?: No  Down's Syndrome?: No  Reece-Sachs disease (Anabaptist, Cajun, Czech-[place])?: No  Sickle cell disease or trait ()?: No  Hemophilia or other inherited problems of blood?: No  Muscular dystrophy?: No  Cystic fibrosis?: No  [place]'s chorea?: No  Mental retardation/autism?: No  If yes, was the person tested for fragile X?: No  Any other inherited genetic or chromosomal disorder?: No  Maternal metabolic disorder (e.g Insulin-dependent diabetes, PKU)?: No  A child with birth defects not listed above?: No  Recurrent pregnancy loss or stillbirth?: No   Has the patient had any medications/street drugs/alcohol since her last menstrual period?: No  Does the patient or baby's father have any other genetic risks?: No    Infection History   Do you object to being tested for Hepatitis B?: No  Do you object to being tested for HIV?: No   Do you feel that you are at high risk for coming in contact with the AIDS virus?: No  Have you ever been treated for tuberculosis?: No  Have you ever had a positive skin test for tuberculosis?: No  Do you live with someone who has tuberculosis?: No  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

## 2019-04-19 LAB
BACTERIA SPEC CULT: NORMAL
HBV SURFACE AG SERPL QL IA: NONREACTIVE
HIV 1+2 AB+HIV1 P24 AG SERPL QL IA: NONREACTIVE
SPECIMEN SOURCE: NORMAL

## 2019-04-20 LAB
RUBV IGG SERPL IA-ACNC: 29 IU/ML
T PALLIDUM AB SER QL: NONREACTIVE

## 2019-05-06 ENCOUNTER — TELEPHONE (OUTPATIENT)
Dept: MATERNAL FETAL MEDICINE | Facility: CLINIC | Age: 29
End: 2019-05-06

## 2019-05-06 NOTE — TELEPHONE ENCOUNTER
We received a FTS referral from provider. Upon reaching patient, she declined wanting screening and said they are no longer interested.     Referring clinic notified. Removing order.    Kelvin Orta,    , [facility]

## 2019-05-23 ENCOUNTER — OFFICE VISIT (OUTPATIENT)
Dept: OBGYN | Facility: CLINIC | Age: 29
End: 2019-05-23
Payer: COMMERCIAL

## 2019-05-23 ENCOUNTER — ANESTHESIA EVENT (OUTPATIENT)
Dept: SURGERY | Facility: CLINIC | Age: 29
End: 2019-05-23
Payer: COMMERCIAL

## 2019-05-23 ENCOUNTER — HOSPITAL ENCOUNTER (OUTPATIENT)
Dept: ULTRASOUND IMAGING | Facility: CLINIC | Age: 29
Discharge: HOME | End: 2019-05-23
Attending: OBSTETRICS & GYNECOLOGY | Admitting: OBSTETRICS & GYNECOLOGY
Payer: COMMERCIAL

## 2019-05-23 ENCOUNTER — TELEPHONE (OUTPATIENT)
Dept: OBGYN | Facility: CLINIC | Age: 29
End: 2019-05-23

## 2019-05-23 VITALS
BODY MASS INDEX: 39.07 KG/M2 | WEIGHT: 220.5 LBS | DIASTOLIC BLOOD PRESSURE: 78 MMHG | HEIGHT: 63 IN | SYSTOLIC BLOOD PRESSURE: 132 MMHG

## 2019-05-23 DIAGNOSIS — O02.1 MISSED ABORTION: Primary | ICD-10-CM

## 2019-05-23 DIAGNOSIS — O26.859 SPOTTING IN EARLY PREGNANCY: ICD-10-CM

## 2019-05-23 LAB
ERYTHROCYTE [DISTWIDTH] IN BLOOD BY AUTOMATED COUNT: 13.1 % (ref 10–15)
HCT VFR BLD AUTO: 38.7 % (ref 35–47)
HGB BLD-MCNC: 13.3 G/DL (ref 11.7–15.7)
MCH RBC QN AUTO: 30 PG (ref 26.5–33)
MCHC RBC AUTO-ENTMCNC: 34.4 G/DL (ref 31.5–36.5)
MCV RBC AUTO: 87 FL (ref 78–100)
PLATELET # BLD AUTO: 257 10E9/L (ref 150–450)
RADIOLOGIST FLAGS: ABNORMAL
RBC # BLD AUTO: 4.43 10E12/L (ref 3.8–5.2)
WBC # BLD AUTO: 11.1 10E9/L (ref 4–11)

## 2019-05-23 PROCEDURE — 99213 OFFICE O/P EST LOW 20 MIN: CPT | Performed by: OBSTETRICS & GYNECOLOGY

## 2019-05-23 PROCEDURE — 76801 OB US < 14 WKS SINGLE FETUS: CPT

## 2019-05-23 PROCEDURE — 85027 COMPLETE CBC AUTOMATED: CPT | Performed by: OBSTETRICS & GYNECOLOGY

## 2019-05-23 PROCEDURE — 36415 COLL VENOUS BLD VENIPUNCTURE: CPT | Performed by: OBSTETRICS & GYNECOLOGY

## 2019-05-23 ASSESSMENT — MIFFLIN-ST. JEOR: SCORE: 1699.31

## 2019-05-23 NOTE — TELEPHONE ENCOUNTER
Type of surgery: gyn  Location of surgery: [facility] [place] [facility]  Date and time of surgery: 5/24/19 100p  Surgeon: Dr. Barnett  Pre-Op Appt Date: tbd  Post-Op Appt Date: tbd   Packet sent out: Not Applicable  Pre-cert/Authorization completed:  Not Applicable  Date: 05/23/19  Echo Cho,

## 2019-05-23 NOTE — PROGRESS NOTES
Bleeding in pregnancy  HPI:  Liyah Rodriguez is a 28 year old female is a  .  Patient's last menstrual period was 2019.  12w2d and had an ultrasound at 7 wks that was normal.    conceived this pregnancy with letrozole and here with spouse. They went to [facility] for a consult but conceived here.   Had bright red bleeding this morning with a small clot noted.  Fetal heart tones not heard in clinic and bedside ultrasound appeared to be fetal demise.  Sent for formal ultrasound.    FINDINGS:   No heartbeat is detected. The crown-rump length measures 28 mm  corresponding to a gestational age of 9 weeks 5 days. No uterine mass  Lesion.     Patient is seen here with her  and informed of the results. Pt expresses appropriate sadness at loss.  Reassured her that the loss could not have been stopped by her actions or any other persons actions.       Past Medical History:   Diagnosis Date     Contraception     depo for a few yrs - to Mirena in        Past Surgical History:   Procedure Laterality Date     ACL repair  2008     Right lateral menisectomy  2008     XR HYSTEROSALPINGOGRAM  2019    normal fill and spill       Family History   Problem Relation Age of Onset     Heart Disease Paternal Grandmother         CHF     Hypertension Mother      Hypertension Maternal Grandmother      High cholesterol Maternal Grandmother      Cancer Maternal Grandfather         lung cancer     Cancer Father 50        lymphoma     Aneurysm Father 63        spotty vision, occasional weakness prior- then sudden death, smoker       Social History     Socioeconomic History     Marital status: Single     Spouse name: None     Number of children: None     Years of education: None     Highest education level: None   Occupational History     Employer: [employer]     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[employer]   Social Needs     Financial resource strain: None     Food insecurity:     Worry: None     Inability: None     Transportation  "needs:     Medical: None     Non-medical: None   Tobacco Use     Smoking status: Never Smoker     Smokeless tobacco: Never Used   Substance and Sexual Activity     Alcohol use: Yes     Comment: occ     Drug use: No     Sexual activity: Yes     Partners: Male     Birth control/protection: None   Lifestyle     Physical activity:     Days per week: None     Minutes per session: None     Stress: None   Relationships     Social connections:     Talks on phone: None     Gets together: None     Attends Yazidism service: None     Active member of club or organization: None     Attends meetings of clubs or organizations: None     Relationship status: None     Intimate partner violence:     Fear of current or ex partner: None     Emotionally abused: None     Physically abused: None     Forced sexual activity: None   Other Topics Concern     None   Social History Narrative     None       Allergies: Nkda [no known drug allergies]    Current Outpatient Medications   Medication Sig Dispense Refill     Doxylamine Succinate, Sleep, (UNISOM PO)        Pyridoxine HCl (B-6 PO)        Prenatal Vit-Fe Fumarate-FA (PRENATAL VITAMIN PO)        vitamin D3 (CHOLECALCIFEROL) 2000 units tablet Take 1 tablet by mouth daily         ROS:  C: NEGATIVE for fever, chills, change in weight  I: NEGATIVE for worrisome rashes, moles or lesions  E: NEGATIVE for vision changes or irritation  E/M: NEGATIVE for ear, mouth and throat problems  R: NEGATIVE for significant cough or SOB  CV: NEGATIVE for chest pain, palpitations or peripheral edema  GI: NEGATIVE for nausea, abdominal pain, heartburn, or change in bowel habits  : NEGATIVE for frequency, dysuria, hematuria, vaginal discharge  M: NEGATIVE for significant arthralgias or myalgia  N: NEGATIVE for weakness, dizziness or paresthesias  E: NEGATIVE for temperature intolerance, skin/hair changes  P: NEGATIVE for changes in mood or affect    EXAM:  Blood pressure 132/78, height 1.6 m (5' 3\"), weight " 100 kg (220 lb 8 oz), last menstrual period 2019, not currently breastfeeding.   BMI= Body mass index is 39.06 kg/m .  General - pleasant female in no acute distress.  Neck - supple without lymphadenopathy or thyromegaly.  Lungs - clear to auscultation bilaterally.  Heart - regular rate and rhythm without murmur.  Breast - deferred.  Abdomen - soft, nontender, nondistended, no hepatosplenomegaly, enlarged lymph nodes or hernia noted.  Pelvic exam:  External genitalia appeared normal without lesion.  Urethral meatus, perineum, and anus appeared normal. Cervix and vagina appeared normal, without lesion, some blood in the vault. Bimanual exam revealed a 10week normal sized non-tender uterus, with no adnexal masses.  Rectovaginal deferred.   Musculoskeletal - no gross deformities.  Skin- no rashes seen  Neurological - normal mood and affect.      ASSESSMENT/PLAN:  (O02.1) Missed   (primary encounter diagnosis)  Comment: 9w5d  Plan: [facility]-Operative Worksheet GYN, CBC with         platelets, ABO/Rh type and screen        Options:  1. Expectant management:  - Waiting for miscarriage to start  - Heavy bleeding and cramping until pregnancy sac passes, then bleeding and cramping tapers off  - Unknown when it will start  -.Can take Ibuprofen 800 mg (4 of the over the counter tablets) every 8 hours for cramping  - If no progression in 2 weeks, make a clinic appointment for options    2. Misoprostol  - Medication to start miscarriage  - Vaginal medication, repeat in 24 hours if not effective  - If not successful will need a D&C    3. D&C  - Outpatient surgery to complete the miscarriage  - Home the same day  - Short procedure with IV sedation  - Able to do chromosome testing if desired     They would like to proceed with D&C. We specifically reviewed risk of bleeding, infection, possible uterine perferation and need to repeat the procedure. Pt understands and agrees to proceed.    Discussed pregnancy test 3 weeks  after the procedure and if negative can do E visit and plan for letrozole prescription to take after next menses.  Questions were answered.    JYOTI FRANKLIN

## 2019-05-23 NOTE — TELEPHONE ENCOUNTER
Patient calling regarding spotting in pregnancy. 12w2d. Noticed bright red bleeding this morning with wiping and in pantyliner. Very small clot. No cramping. No recent IC, BMs, constipation or change in activity level. Do you want her to have an US and see you today? OV just to check FHT and possible bedside US? Please advise. Thanks!  Sheree Baez

## 2019-05-23 NOTE — Clinical Note
My patient with 9w5d missed AB. Super nice couple. Already with some bleeding so should be easy. Considering chromo testing. amy

## 2019-05-24 ENCOUNTER — HOSPITAL ENCOUNTER (OUTPATIENT)
Facility: CLINIC | Age: 29
Discharge: HOME | End: 2019-05-24
Attending: OBSTETRICS & GYNECOLOGY | Admitting: OBSTETRICS & GYNECOLOGY
Payer: COMMERCIAL

## 2019-05-24 ENCOUNTER — ANESTHESIA (OUTPATIENT)
Dept: SURGERY | Facility: CLINIC | Age: 29
End: 2019-05-24
Payer: COMMERCIAL

## 2019-05-24 VITALS
OXYGEN SATURATION: 98 % | DIASTOLIC BLOOD PRESSURE: 92 MMHG | TEMPERATURE: 97.7 F | WEIGHT: 219.8 LBS | SYSTOLIC BLOOD PRESSURE: 121 MMHG | BODY MASS INDEX: 38.95 KG/M2 | HEIGHT: 63 IN | RESPIRATION RATE: 16 BRPM | HEART RATE: 79 BPM

## 2019-05-24 DIAGNOSIS — Z98.890 S/P D&C (STATUS POST DILATION AND CURETTAGE): Primary | ICD-10-CM

## 2019-05-24 LAB
ABO + RH BLD: NORMAL
ABO + RH BLD: NORMAL
BLD GP AB SCN SERPL QL: NORMAL
BLOOD BANK CMNT PATIENT-IMP: NORMAL
GLUCOSE BLDC GLUCOMTR-MCNC: 95 MG/DL (ref 70–99)
SPECIMEN EXP DATE BLD: NORMAL

## 2019-05-24 PROCEDURE — 71000027 ZZH RECOVERY PHASE 2 EACH 15 MINS: Performed by: OBSTETRICS & GYNECOLOGY

## 2019-05-24 PROCEDURE — 25800030 ZZH RX IP 258 OP 636: Performed by: ANESTHESIOLOGY

## 2019-05-24 PROCEDURE — 86900 BLOOD TYPING SEROLOGIC ABO: CPT | Performed by: ANESTHESIOLOGY

## 2019-05-24 PROCEDURE — 37000008 ZZH ANESTHESIA TECHNICAL FEE, 1ST 30 MIN: Performed by: OBSTETRICS & GYNECOLOGY

## 2019-05-24 PROCEDURE — 86850 RBC ANTIBODY SCREEN: CPT | Performed by: ANESTHESIOLOGY

## 2019-05-24 PROCEDURE — 36415 COLL VENOUS BLD VENIPUNCTURE: CPT | Performed by: ANESTHESIOLOGY

## 2019-05-24 PROCEDURE — 25000128 H RX IP 250 OP 636: Performed by: NURSE ANESTHETIST, CERTIFIED REGISTERED

## 2019-05-24 PROCEDURE — 36000051 ZZH SURGERY LEVEL 2 1ST 30 MIN - UMMC: Performed by: OBSTETRICS & GYNECOLOGY

## 2019-05-24 PROCEDURE — 25000125 ZZHC RX 250: Performed by: NURSE ANESTHETIST, CERTIFIED REGISTERED

## 2019-05-24 PROCEDURE — 88305 TISSUE EXAM BY PATHOLOGIST: CPT | Performed by: OBSTETRICS & GYNECOLOGY

## 2019-05-24 PROCEDURE — 25000125 ZZHC RX 250: Performed by: ANESTHESIOLOGY

## 2019-05-24 PROCEDURE — 86901 BLOOD TYPING SEROLOGIC RH(D): CPT | Performed by: ANESTHESIOLOGY

## 2019-05-24 PROCEDURE — 88305 TISSUE EXAM BY PATHOLOGIST: CPT | Mod: 26 | Performed by: OBSTETRICS & GYNECOLOGY

## 2019-05-24 PROCEDURE — 82962 GLUCOSE BLOOD TEST: CPT

## 2019-05-24 PROCEDURE — 27210794 ZZH OR GENERAL SUPPLY STERILE: Performed by: OBSTETRICS & GYNECOLOGY

## 2019-05-24 PROCEDURE — 00000159 ZZHCL STATISTIC H-SEND OUTS PREP: Performed by: OBSTETRICS & GYNECOLOGY

## 2019-05-24 PROCEDURE — 40000170 ZZH STATISTIC PRE-PROCEDURE ASSESSMENT II: Performed by: OBSTETRICS & GYNECOLOGY

## 2019-05-24 PROCEDURE — 37000009 ZZH ANESTHESIA TECHNICAL FEE, EACH ADDTL 15 MIN: Performed by: OBSTETRICS & GYNECOLOGY

## 2019-05-24 PROCEDURE — 36000053 ZZH SURGERY LEVEL 2 EA 15 ADDTL MIN - UMMC: Performed by: OBSTETRICS & GYNECOLOGY

## 2019-05-24 RX ORDER — KETOROLAC TROMETHAMINE 30 MG/ML
INJECTION, SOLUTION INTRAMUSCULAR; INTRAVENOUS PRN
Status: DISCONTINUED | OUTPATIENT
Start: 2019-05-24 | End: 2019-05-24

## 2019-05-24 RX ORDER — PROPOFOL 10 MG/ML
INJECTION, EMULSION INTRAVENOUS PRN
Status: DISCONTINUED | OUTPATIENT
Start: 2019-05-24 | End: 2019-05-24

## 2019-05-24 RX ORDER — DEXAMETHASONE SODIUM PHOSPHATE 4 MG/ML
INJECTION, SOLUTION INTRA-ARTICULAR; INTRALESIONAL; INTRAMUSCULAR; INTRAVENOUS; SOFT TISSUE PRN
Status: DISCONTINUED | OUTPATIENT
Start: 2019-05-24 | End: 2019-05-24

## 2019-05-24 RX ORDER — FENTANYL CITRATE 50 UG/ML
25-50 INJECTION, SOLUTION INTRAMUSCULAR; INTRAVENOUS
Status: DISCONTINUED | OUTPATIENT
Start: 2019-05-24 | End: 2019-05-24 | Stop reason: HOSPADM

## 2019-05-24 RX ORDER — NALOXONE HYDROCHLORIDE 0.4 MG/ML
.1-.4 INJECTION, SOLUTION INTRAMUSCULAR; INTRAVENOUS; SUBCUTANEOUS
Status: DISCONTINUED | OUTPATIENT
Start: 2019-05-24 | End: 2019-05-24 | Stop reason: HOSPADM

## 2019-05-24 RX ORDER — ACETAMINOPHEN 325 MG/1
650 TABLET ORAL
Status: DISCONTINUED | OUTPATIENT
Start: 2019-05-24 | End: 2019-05-24 | Stop reason: HOSPADM

## 2019-05-24 RX ORDER — HYDROMORPHONE HYDROCHLORIDE 1 MG/ML
.3-.5 INJECTION, SOLUTION INTRAMUSCULAR; INTRAVENOUS; SUBCUTANEOUS EVERY 5 MIN PRN
Status: DISCONTINUED | OUTPATIENT
Start: 2019-05-24 | End: 2019-05-24 | Stop reason: HOSPADM

## 2019-05-24 RX ORDER — IBUPROFEN 600 MG/1
600 TABLET, FILM COATED ORAL EVERY 6 HOURS PRN
Qty: 30 TABLET | Refills: 0 | Status: SHIPPED | OUTPATIENT
Start: 2019-05-24 | End: 2019-11-05

## 2019-05-24 RX ORDER — SODIUM CHLORIDE, SODIUM LACTATE, POTASSIUM CHLORIDE, CALCIUM CHLORIDE 600; 310; 30; 20 MG/100ML; MG/100ML; MG/100ML; MG/100ML
500 INJECTION, SOLUTION INTRAVENOUS CONTINUOUS
Status: DISCONTINUED | OUTPATIENT
Start: 2019-05-24 | End: 2019-05-24 | Stop reason: HOSPADM

## 2019-05-24 RX ORDER — ONDANSETRON 2 MG/ML
4 INJECTION INTRAMUSCULAR; INTRAVENOUS EVERY 30 MIN PRN
Status: DISCONTINUED | OUTPATIENT
Start: 2019-05-24 | End: 2019-05-24

## 2019-05-24 RX ORDER — ONDANSETRON 4 MG/1
4 TABLET, ORALLY DISINTEGRATING ORAL EVERY 30 MIN PRN
Status: DISCONTINUED | OUTPATIENT
Start: 2019-05-24 | End: 2019-05-24 | Stop reason: HOSPADM

## 2019-05-24 RX ORDER — PROPOFOL 10 MG/ML
INJECTION, EMULSION INTRAVENOUS CONTINUOUS PRN
Status: DISCONTINUED | OUTPATIENT
Start: 2019-05-24 | End: 2019-05-24

## 2019-05-24 RX ORDER — LIDOCAINE 40 MG/G
CREAM TOPICAL
Status: DISCONTINUED | OUTPATIENT
Start: 2019-05-24 | End: 2019-05-24 | Stop reason: HOSPADM

## 2019-05-24 RX ORDER — ONDANSETRON 2 MG/ML
4 INJECTION INTRAMUSCULAR; INTRAVENOUS EVERY 30 MIN PRN
Status: DISCONTINUED | OUTPATIENT
Start: 2019-05-24 | End: 2019-05-24 | Stop reason: HOSPADM

## 2019-05-24 RX ORDER — ONDANSETRON 2 MG/ML
INJECTION INTRAMUSCULAR; INTRAVENOUS PRN
Status: DISCONTINUED | OUTPATIENT
Start: 2019-05-24 | End: 2019-05-24

## 2019-05-24 RX ORDER — CEFAZOLIN SODIUM 2 G/100ML
INJECTION, SOLUTION INTRAVENOUS PRN
Status: DISCONTINUED | OUTPATIENT
Start: 2019-05-24 | End: 2019-05-24

## 2019-05-24 RX ORDER — NALOXONE HYDROCHLORIDE 0.4 MG/ML
.1-.4 INJECTION, SOLUTION INTRAMUSCULAR; INTRAVENOUS; SUBCUTANEOUS
Status: DISCONTINUED | OUTPATIENT
Start: 2019-05-24 | End: 2019-05-24

## 2019-05-24 RX ORDER — ONDANSETRON 4 MG/1
4 TABLET, ORALLY DISINTEGRATING ORAL EVERY 30 MIN PRN
Status: DISCONTINUED | OUTPATIENT
Start: 2019-05-24 | End: 2019-05-24

## 2019-05-24 RX ORDER — LIDOCAINE HYDROCHLORIDE 20 MG/ML
INJECTION, SOLUTION INFILTRATION; PERINEURAL PRN
Status: DISCONTINUED | OUTPATIENT
Start: 2019-05-24 | End: 2019-05-24

## 2019-05-24 RX ORDER — FENTANYL CITRATE 50 UG/ML
INJECTION, SOLUTION INTRAMUSCULAR; INTRAVENOUS PRN
Status: DISCONTINUED | OUTPATIENT
Start: 2019-05-24 | End: 2019-05-24

## 2019-05-24 RX ORDER — PHENAZOPYRIDINE HYDROCHLORIDE 200 MG/1
200 TABLET, FILM COATED ORAL ONCE
Status: CANCELLED | OUTPATIENT
Start: 2019-05-24

## 2019-05-24 RX ORDER — SODIUM CHLORIDE, SODIUM LACTATE, POTASSIUM CHLORIDE, CALCIUM CHLORIDE 600; 310; 30; 20 MG/100ML; MG/100ML; MG/100ML; MG/100ML
INJECTION, SOLUTION INTRAVENOUS CONTINUOUS
Status: DISCONTINUED | OUTPATIENT
Start: 2019-05-24 | End: 2019-05-24 | Stop reason: HOSPADM

## 2019-05-24 RX ORDER — ACETAMINOPHEN 500 MG
500-1000 TABLET ORAL EVERY 6 HOURS PRN
Status: ON HOLD | COMMUNITY
End: 2020-06-04

## 2019-05-24 RX ORDER — SODIUM CHLORIDE, SODIUM LACTATE, POTASSIUM CHLORIDE, CALCIUM CHLORIDE 600; 310; 30; 20 MG/100ML; MG/100ML; MG/100ML; MG/100ML
INJECTION, SOLUTION INTRAVENOUS CONTINUOUS
Status: DISCONTINUED | OUTPATIENT
Start: 2019-05-24 | End: 2019-05-24

## 2019-05-24 RX ADMIN — KETOROLAC TROMETHAMINE 30 MG: 30 INJECTION, SOLUTION INTRAMUSCULAR at 14:53

## 2019-05-24 RX ADMIN — ONDANSETRON 4 MG: 2 INJECTION INTRAMUSCULAR; INTRAVENOUS at 14:32

## 2019-05-24 RX ADMIN — FENTANYL CITRATE 50 MCG: 50 INJECTION, SOLUTION INTRAMUSCULAR; INTRAVENOUS at 14:08

## 2019-05-24 RX ADMIN — CEFAZOLIN SODIUM 2 G: 2 INJECTION, SOLUTION INTRAVENOUS at 14:22

## 2019-05-24 RX ADMIN — SODIUM CHLORIDE, SODIUM LACTATE, POTASSIUM CHLORIDE, CALCIUM CHLORIDE: 600; 310; 30; 20 INJECTION, SOLUTION INTRAVENOUS at 14:10

## 2019-05-24 RX ADMIN — PROPOFOL 50 MG: 10 INJECTION, EMULSION INTRAVENOUS at 14:31

## 2019-05-24 RX ADMIN — MIDAZOLAM 2 MG: 1 INJECTION INTRAMUSCULAR; INTRAVENOUS at 14:05

## 2019-05-24 RX ADMIN — PROPOFOL 50 MG: 10 INJECTION, EMULSION INTRAVENOUS at 14:37

## 2019-05-24 RX ADMIN — SODIUM CHLORIDE, SODIUM LACTATE, POTASSIUM CHLORIDE, CALCIUM CHLORIDE: 600; 310; 30; 20 INJECTION, SOLUTION INTRAVENOUS at 15:00

## 2019-05-24 RX ADMIN — FENTANYL CITRATE 50 MCG: 50 INJECTION, SOLUTION INTRAMUSCULAR; INTRAVENOUS at 14:37

## 2019-05-24 RX ADMIN — PROPOFOL 100 MCG/KG/MIN: 10 INJECTION, EMULSION INTRAVENOUS at 14:10

## 2019-05-24 RX ADMIN — LIDOCAINE HYDROCHLORIDE 100 MG: 20 INJECTION, SOLUTION INFILTRATION; PERINEURAL at 14:10

## 2019-05-24 RX ADMIN — DEXAMETHASONE SODIUM PHOSPHATE 8 MG: 4 INJECTION, SOLUTION INTRAMUSCULAR; INTRAVENOUS at 14:32

## 2019-05-24 RX ADMIN — PROPOFOL 80 MG: 10 INJECTION, EMULSION INTRAVENOUS at 14:10

## 2019-05-24 ASSESSMENT — MIFFLIN-ST. JEOR: SCORE: 1696.13

## 2019-05-24 NOTE — BRIEF OP NOTE
[facility], [place]    Brief Operative Note    Pre-operative diagnosis: Missed  at 9w5d  Post-operative diagnosis Same  Procedure: Procedure(s):  Suction Dilation and Curettage   (9 Weeks and 5 Days)  Surgeon: Surgeon(s) and Role:     * Coni Barnett MD - Primary     * Roz Ramirez MD - Resident - Assisting  Anesthesia: Combined MAC with Local   Estimated blood loss: 50cc  IVF: 1000cc crystalloid  UOP: voided on call to OR  Drains: None  Specimens:   ID Type Source Tests Collected by Time Destination   A : poc Products of Conception Placenta/ Fragments/ Fetus from Miscarriage or Termination SURGICAL PATHOLOGY EXAM Coni Barnett MD 2019  2:34 PM      Findings:  On EUA normal external female genitalia. Uterus 10wk size, anteverted. Cervix normal without lesions. Easily dilated to 10 Hegar. 10mm suction cannula used to empty uterus, tissue visualized through suction tubing. 7mm suction cannula was then used with minimal output. Gentle curette performed at end of the case with gritty texture noted throughout. Tenaculum sites hemostatic at end of case.  Complications: None.    Roz Ramirez PGY-1  2019 2:55 PM

## 2019-05-24 NOTE — ANESTHESIA CARE TRANSFER NOTE
Patient: Liyah Rodriguez  Transfer to PACU for recovery.  Monitors placed.  VSS noted.  Report to RN.    Procedure(s):  Suction Dilation and Curettage   (9 Weeks and 5 Days)    Diagnosis: Missed   Diagnosis Additional Information: No value filed.    Anesthesia Type:   No value filed.     Note:  Anesthesia Care Transfer Notewriter    Vitals: (Last set prior to Anesthesia Care Transfer)    CRNA VITALS  2019 1423 - 2019 1506      2019             NIBP:  186/80    Pulse:  111    Temp:  36.4  C (97.5  F)    SpO2:  96 %    Resp Rate (observed):  22    EKG:  Sinus rhythm                Electronically Signed By: DALE SILVA CRNA  May 24, 2019  3:06 PM

## 2019-05-24 NOTE — DISCHARGE INSTRUCTIONS
Same-Day Surgery   Adult Discharge Orders & Instructions     For 24 hours after surgery:  1. Get plenty of rest.  A responsible adult must stay with you for at least 24 hours after you leave the hospital.   2. Pain medication can slow your reflexes. Do not drive or use heavy equipment.  If you have weakness or tingling, don't drive or use heavy equipment until this feeling goes away.  3. Mixing alcohol and pain medication can cause dizziness and slow your breathing. It can even be fatal. Do not drink alcohol while taking pain medication.  4. Avoid strenuous or risky activities.  Ask for help when climbing stairs.   5. You may feel lightheaded.  If so, sit for a few minutes before standing.  Have someone help you get up.   6. If you have nausea (feel sick to your stomach), drink only clear liquids such as apple juice, ginger ale, broth or 7-Up.  Rest may also help.  Be sure to drink enough fluids.  Move to a regular diet as you feel able. Take pain medications with a small amount of solid food, such as toast or crackers, to avoid nausea.   7. A slight fever is normal. Call the doctor if your fever is over 100 F (37.7 C) (taken under the tongue) or lasts longer than 24 hours.  8. You may have a dry mouth, muscle aches, trouble sleeping or a sore throat.  These symptoms should go away after 24 hours.  9. Do not make important or legal decisions.   Pain Management:      1. Take pain medication (if prescribed) for pain as directed by your physician.        2. WARNING: If the pain medication you have been prescribed contains Tylenol  (acetaminophen), DO NOT take additional doses of Tylenol (acetaminophen).     Call your doctor for any of the followin.  Signs of infection (fever, growing tenderness at the surgery site, severe pain, a large amount of drainage or bleeding, foul-smelling drainage, redness, swelling).    2.  It has been over 8 to 10 hours since surgery and you are still not able to urinate (pee).    3.   Headache for over 24 hours.    4.  Numbness, tingling or weakness the day after surgery (if you had spinal anesthesia).  To contact a doctor, call ________Dr. Barnett_____________________________ or:      [phone] and ask for the Resident On Call for:          ____________OBGYN______________________________ (answered 24 hours a day)      Emergency Department:  [place] [facility]: [phone]  [place] [facility]: [phone]               Rev. 10/2014      Discharge Instructions: Following a Dilation   and Curettage/Dilation and Evacuation    What to expect:    Expect small to moderate amount of vaginal bleeding which should taper off in 4-5 days. It should not be heavier than your regular menstrual flow.    Do not douche, and use a pad rather than tampons.     No intercourse until bleeding has ceased.    Activity:    Rest the day of surgery. You may resume normal activity the next day.    You may bathe or shower.    Avoid heavy lifting (10-15 lbs) for one week.    Comfort:    The amount of discomfort you can expect is very unpredictable. If you have pain that cannot be controlled with non-aspirin pain relievers or with the prescription you may have received, you should notify your doctor.    Abdominal cramping (like menstrual cramps) or low back ache are common and should not be a cause for concern. You will be drowsy and weak the day of surgery and possibly the following day.    Diet:    You have no restrictions on your diet. Following surgery, drink plenty of fluids and eat a light meal.    Nausea:    The anesthesia medications you received during your surgical procedure may produce some nausea.    If you feel nauseated, stay in bed, keep your head down and try drinking fluids such as Seven-Up, tea or soup.    Notify Physician at once if you experience:    A fever over 100 degrees (a low grade fever under 100 degrees is usual after surgery).    Heavy flow and/or passing large  clots. Saturating more than 1 pad per hour for 2 or more hours.     Severe pain or cramps.  Rev. 5/12

## 2019-05-24 NOTE — ANESTHESIA PREPROCEDURE EVALUATION
"Anesthesia Pre-Procedure Evaluation    Patient: Liyah Rodriguez   MRN:     [id number] Gender:   female   Age:    28 year old :      1990        Preoperative Diagnosis: Missed    Procedure(s):  Suction Dilation and Curettage   (9 Weeks and 5 Days)     Past Medical History:   Diagnosis Date     Contraception     depo for a few yrs - to Mirena in       Past Surgical History:   Procedure Laterality Date     ACL repair  2008     ORTHOPEDIC SURGERY Right     knee surgery     Right lateral menisectomy  2008     XR HYSTEROSALPINGOGRAM  2019    normal fill and spill                   PHYSICAL EXAM:   Mental Status/Neuro:    Airway: Facies: Feasible  Mallampati: II  Mouth/Opening: Full  TM distance: > 6 cm  Neck ROM: Full   Respiratory: Auscultation: CTAB     Resp. Rate: Normal     Resp. Effort: Normal      CV: Rhythm: Regular  Rate: Age appropriate  Heart: Normal Sounds   Comments:      Dental: Normal                  Lab Results   Component Value Date    WBC 11.1 (H) 2019    HGB 13.3 2019    HCT 38.7 2019     2019    GLC 61 (L) 2015    TSH 3.19 01/10/2019    HCG Negative 2013       Preop Vitals  BP Readings from Last 3 Encounters:   19 (!) 137/96   19 132/78   19 132/84    Pulse Readings from Last 3 Encounters:   19 85   19 98   01/10/19 98      Resp Readings from Last 3 Encounters:   19 20   16 16   12/08/15 16    SpO2 Readings from Last 3 Encounters:   19 97%   10/01/18 99%   17 98%      Temp Readings from Last 1 Encounters:   19 36.4  C (97.5  F) (Oral)    Ht Readings from Last 1 Encounters:   19 1.6 m (5' 3\")      Wt Readings from Last 1 Encounters:   19 99.7 kg (219 lb 12.8 oz)    Estimated body mass index is 38.94 kg/m  as calculated from the following:    Height as of this encounter: 1.6 m (5' 3\").    Weight as of this encounter: 99.7 kg (219 lb 12.8 oz). "     LDA:  Peripheral IV 05/24/19 Right Upper forearm (Active)   Site Assessment WDL 5/24/2019 11:47 AM   Line Status Saline locked 5/24/2019 11:47 AM   Phlebitis Scale 0-->no symptoms 5/24/2019 11:47 AM   Infiltration Scale 0 5/24/2019 11:47 AM   Dressing Intervention New dressing  5/24/2019 11:47 AM   Number of days: 0            Assessment:   ASA SCORE: 2       Documentation: H&P complete; Preop Testing complete; Consents complete   Proceeding: Proceed without further delay  Tobacco Use:  NO Active use of Tobacco/UNKNOWN Tobacco use status     Plan:   Anes. Type:  MAC   Pre-Induction: Midazolam IV   Induction:  Not applicable   Airway: Native Airway   Access/Monitoring: PIV   Maintenance: N/a   Emergence: N/a   Logistics: Same Day Surgery     Postop Pain/Sedation Strategy:  Standard-Options: Opioids PRN     PONV Management:  Adult Risk Factors: Female, Non-Smoker, Postop Opioids     CONSENT: Direct conversation   Plan and risks discussed with: Patient                            Nicholas Schilling DO

## 2019-05-24 NOTE — ANESTHESIA POSTPROCEDURE EVALUATION
Anesthesia POST Procedure Evaluation    Patient: Liyah Rodriguez   MRN:     [id number] Gender:   female   Age:    28 year old :      1990        Preoperative Diagnosis: Missed    Procedure(s):  Suction Dilation and Curettage   (9 Weeks and 5 Days)   Postop Comments: No value filed.       Anesthesia Type:  MAC  No value filed.    Reportable Event: NO     PAIN: Uncomplicated   Sign Out status: Comfortable, Well controlled pain     PONV: No PONV   Sign Out status:  No Nausea or Vomiting     Neuro/Psych: Uneventful perioperative course   Sign Out Status: Preoperative baseline; Age appropriate mentation     Airway/Resp.: Uneventful perioperative course   Sign Out Status: Non labored breathing, age appropriate RR; Resp. Status within EXPECTED Parameters     CV: Uneventful perioperative course   Sign Out status: Appropriate BP and perfusion indices; Appropriate HR/Rhythm     Disposition:   Sign Out in:  PACU  Disposition:  Phase II; Home  Recovery Course: Uneventful  Follow-Up: Not required           Last Anesthesia Record Vitals:  CRNA VITALS  2019 1423 - 2019 1523      2019             NIBP:  186/80    Pulse:  111    Temp:  36.4  C (97.5  F)    SpO2:  96 %    Resp Rate (observed):  22    EKG:  Sinus rhythm          Last PACU Vitals:  Vitals Value Taken Time   /107 2019  2:57 PM   Temp 36.5  C (97.7  F) 2019  2:57 PM   Pulse     Resp 20 2019  2:57 PM   SpO2 98 % 2019  2:57 PM   Temp src     NIBP 154/78 2019  2:55 PM   Pulse 111 2019  2:55 PM   SpO2 92 % 2019  2:53 PM   Resp     Temp     Ht Rate 103 2019  2:49 PM   Temp 2           Electronically Signed By: Portia Ma MD, May 24, 2019, 4:54 PM

## 2019-05-24 NOTE — OP NOTE
[facility], [place]   Operative Note  May 26, 2019     Liyah Rodriguez  1990  [id number]     Preoperative diagnosis:  Missed  measuring 9w6d   Postoperative diagnosis:  Same  Procedure:  Suction dilation and curettage  Surgeon:  Coni Barnett MD  Assistant: Roz Ramirez MD PGY-1, Donavon Ceron MS4          Anesthesia:  MAC, Paracervical block  Complications: None  Specimens:  Retained products of conception  Condition:  Stable to PACU    EBL:  50 mL  IVF:  1000 mL, crystalloid  UOP:  Voided on call to OR    Findings:  On EUA normal external female genitalia. Uterus 10wk size, anteverted. Cervix normal without lesions. Easily dilated to 10 Hegar. 10mm suction cannula used to empty uterus, tissue visualized through suction tubing. 7mm suction cannula was then used with minimal additional output. Gentle sharp curettage performed at end of the case with gritty texture noted throughout. Tenaculum sites hemostatic at end of case.    Indication:  Liyah Rodriguez is a 28 year old  diagnosed with missed  measuring 9w6d. Management options including expectant, medical and surgical were discussed. She desired surgical management with suction dilation and curettage. Risks and benefits of each were reviewed, written consent signed and she agreed to proceed.    Procedure:  The patient was taken to the operating room where she underwent MAC anesthesia without difficulty. She was placed in a dorsal lithotomy position using yellow fin stirrups. The patient was examined for the above noted findings and then prepped and draped in the usual sterile fashion. A speculum was inserted into the vagina and the findings noted above were seen. A 1 cc of 1% lidocaine plain was injected into the anterior lip of the cervix. Additional 5 ml was injected at the 4 o'clock and 8 o'clock positions of the cervicovaginal junction.  A tenaculum was placed on the  anterior cervical lip.  The endocervical canal was serially dilated to 10mm Hegar dilators.  The suction device was then activated and a size 10 suction curette was placed into the cervical canal.  The curette was rotated to clear the uterus. There was return of tissue. A size 7 suction curette was than used with no additional tissue noted through the tubing.  A gentle sharp curettage was then performed. All instruments were then removed. The tenaculum was removed from the cervix.  The tenaculum site was then noted to be hemostatic.  There was minimal bleeding coming from the cervical os at the end of the procedure. The patient was repositioned to the supine position. The patient tolerated the procedure well and was taken to the recovery room in stable condition.  Dr. Barnett was present for the entire procedure.    Roz Ramirez PGY1  OBGYN PGY-1  3:08 PM 5/24/2019    Physician Attestation   I was present for the entire procedure. I have reviewed and edited the above operative report to reflect the exact findings and details of the surgical procedure.    Coni Barnett MD

## 2019-05-27 ENCOUNTER — NURSE TRIAGE (OUTPATIENT)
Dept: NURSING | Facility: CLINIC | Age: 29
End: 2019-05-27

## 2019-05-27 NOTE — TELEPHONE ENCOUNTER
"Patient calling, had D&C 3 days ago, bleeding increased today about 3 hours ago. Is soaking a pad an hour for the past 3 hours. Had some cramping earlier, took ibuprofen and that has improved. Reviewed symptoms to be seen for.     Additional Information    Negative: Shock suspected (e.g., cold/pale/clammy skin, too weak to stand, low BP, rapid pulse)    Negative: Difficult to awaken or acting confused (e.g., disoriented, slurred speech)    Negative: Passed out (i.e., lost consciousness, collapsed and was not responding)    Negative: Sounds like a life-threatening emergency to the triager    Negative: Followed a genital area injury    Negative: Pregnant > 20 weeks  (5 months or more)    Negative: Pregnant < 20 weeks  (less than 5 months)    Negative: Postpartum < 1 month since delivery (\"Did you recently give birth?\")    Negative: Bleeding occurring > 12 months after menopause    Negative: Bleeding from sexual abuse or rape    Negative: [1] Vaginal discharge is main symptom AND [2] small amount of blood    Negative: SEVERE abdominal pain    Negative: SEVERE dizziness (e.g., unable to stand, requires support to walk, feels like passing out now)    Negative: SEVERE vaginal bleeding (i.e., soaking 2 pads or tampons per hour and present 2 or more hours; 1 menstrual cup every 2 hours)    Negative: Patient sounds very sick or weak to the triager    Negative: MODERATE vaginal bleeding (i.e., soaking 1 pad or tampon per hour and present > 6 hours; 1 menstrual cup every 6 hours)    Negative: [1] Constant abdominal pain AND [2] present > 2 hours    Negative: Pale skin (pallor) of new onset or worsening    Negative: Passed tissue (e.g., gray-white)    Negative: Taking Coumadin (warfarin) or other strong blood thinner, or known bleeding disorder (e.g., thrombocytopenia)    Negative: [1] Skin bruises or nosebleed AND [2] not caused by an injury    Negative: [1] Periods with > 6 soaked pads or tampons per day AND [2] last > 7 " days    Negative: [1] Bleeding or spotting after procedure (e.g., biopsy) or pelvic examination (e.g., pap smear) AND [2] lasts > 7 days    Negative: Periods with > 6 soaked pads or tampons per day    Negative: Periods last > 7 days    Negative: [1] Uses menstrual cups AND [2] more than 80 ml blood per menstrual period.    Negative: [1] Missed period AND [2] has occurred 2 or more times in the last year    Negative: [1] Menstrual cycle < 21 days OR > 35 days AND [2] occurs more than two cycles (2 months) this past year    Negative: [1] Bleeding or spotting between regular periods AND [2] occurs more than two cycles (2 months) this past year    Negative: [1] Bleeding or spotting between regular periods AND [2] occurs more than two cycles (2 months) AND [3] using birth control medicine (pills, patch, Depo-Provera, Implanon, vaginal ring, Mirena IUD)    Negative: Bleeding or spotting occurs after hysterectomy    Negative: Bleeding or spotting occurs after sex (Exception: first intercourse)    [1] MILD  bleeding or SPOTTING AND [2] after procedure (e.g., biopsy) or pelvic examination (e.g., pap smear) AND [2] < 7 days    Negative: [1] MILD bleeding or SPOTTING AND [2] could be pregnant  (e.g., missed last period)    Negative: [1] Menstrual cycle < 21 days OR > 35 days AND [2] has occurred once this past year    Negative: Normal menstrual flow    Protocols used: VAGINAL BLEEDING - [id number]

## 2019-06-04 ENCOUNTER — OFFICE VISIT (OUTPATIENT)
Dept: OBGYN | Facility: CLINIC | Age: 29
End: 2019-06-04
Payer: COMMERCIAL

## 2019-06-04 VITALS
BODY MASS INDEX: 39.12 KG/M2 | SYSTOLIC BLOOD PRESSURE: 133 MMHG | HEIGHT: 63 IN | TEMPERATURE: 98.5 F | OXYGEN SATURATION: 98 % | DIASTOLIC BLOOD PRESSURE: 96 MMHG | HEART RATE: 88 BPM | WEIGHT: 220.8 LBS

## 2019-06-04 DIAGNOSIS — O03.9 MISCARRIAGE: Primary | ICD-10-CM

## 2019-06-04 LAB — HCG UR QL: POSITIVE

## 2019-06-04 PROCEDURE — 99024 POSTOP FOLLOW-UP VISIT: CPT | Performed by: OBSTETRICS & GYNECOLOGY

## 2019-06-04 PROCEDURE — 81025 URINE PREGNANCY TEST: CPT | Performed by: OBSTETRICS & GYNECOLOGY

## 2019-06-04 ASSESSMENT — MIFFLIN-ST. JEOR: SCORE: 1700.67

## 2019-06-04 NOTE — PROGRESS NOTES
"Chief Complaint   Patient presents with     Miscarriage     Follow Up       Initial Ht 1.6 m (5' 3\")   BMI 38.94 kg/m   Estimated body mass index is 38.94 kg/m  as calculated from the following:    Height as of this encounter: 1.6 m (5' 3\").    Weight as of 19: 99.7 kg (219 lb 12.8 oz).  BP completed using cuff size: large    Questioned patient about current smoking habits.  Pt. has never smoked.          The following HM Due: NONE      The following patient reported/Care Every where data was sent to:  P ABSTRACT QUALITY INITIATIVES [52840]  n/a      n/a and patient has appointment for today     Liyah Rodriguez is here for follow up after her D&C  for an SAB.  The procedure was uncomplicated and the final path is still pending.   She has no complaints today.  Emotionally she is appropriate given the circumstances.  She is planning on getting pregnant again soon.  She denies fever,  pain or unusual discharge.    She is still spotting intermittently.      OBJECTIVE:  She appears well, in no apparent distress.  Alert and oriented times three, pleasant and cooperative.   urine pregnancy test  positive today        ASSESSMENT:  D&C for SAB  Declines  Contraception  Path pending.   + UPT only 2 wks out.     PLAN:  circumstances of the pregnancy loss reviewed and emotional support given  mood is appropriate  discussed need for negative UPT, mood stability and one normal menses before attempting pregnancy.  She should take a home UPT in 2 wks and if still positive she should call my office and get a quant hcg  If bleeding does not stop completely within 2 wks, she should also call the office.   Coni Barnett MD            "

## 2019-06-04 NOTE — Clinical Note
surg path from her d and c is not back yet  Can you pls call them and see why.  She is here not for f/u appt after her D & C. RR

## 2019-06-12 ENCOUNTER — MYC MEDICAL ADVICE (OUTPATIENT)
Dept: OBGYN | Facility: CLINIC | Age: 29
End: 2019-06-12

## 2019-06-12 LAB — COPATH REPORT: NORMAL

## 2019-06-24 NOTE — TELEPHONE ENCOUNTER
I called patient and left a message that  I noted her neg UPT and that she was not supposed to be on oral contraceptive pills. (I had mixed her up with a similar patient who did go on oral contraceptive pills) so I explained that. RR

## 2019-07-05 ENCOUNTER — ALLIED HEALTH/NURSE VISIT (OUTPATIENT)
Dept: NURSING | Facility: CLINIC | Age: 29
End: 2019-07-05
Payer: COMMERCIAL

## 2019-07-05 ENCOUNTER — ANCILLARY PROCEDURE (OUTPATIENT)
Dept: ULTRASOUND IMAGING | Facility: CLINIC | Age: 29
End: 2019-07-05
Attending: OBSTETRICS & GYNECOLOGY
Payer: COMMERCIAL

## 2019-07-05 DIAGNOSIS — N92.6 IRREGULAR MENSES: ICD-10-CM

## 2019-07-05 DIAGNOSIS — N92.6 IRREGULAR MENSES: Primary | ICD-10-CM

## 2019-07-05 PROCEDURE — 99207 ZZC NO CHARGE NURSE ONLY: CPT

## 2019-07-05 PROCEDURE — 76830 TRANSVAGINAL US NON-OB: CPT | Performed by: OBSTETRICS & GYNECOLOGY

## 2019-07-05 NOTE — NURSING NOTE
Pt here for hcg trigger injection but will give injection at home with meds from previously planned cycle woth CRM.  Progesterone sent to pharmacy.  Has not noted positive ovulation.  Discussed timing of intercourse, starting progesterone, delaying pregnancy test for 2 weeks, and requesting an e-visit with Dr. Reese prior to the end of the cycle.  Pt understands all instructions and will call if she has any questions/concerns.  Amanda Kamara RN

## 2019-07-31 ENCOUNTER — ANCILLARY PROCEDURE (OUTPATIENT)
Dept: ULTRASOUND IMAGING | Facility: CLINIC | Age: 29
End: 2019-07-31
Attending: OBSTETRICS & GYNECOLOGY
Payer: COMMERCIAL

## 2019-07-31 DIAGNOSIS — N92.6 IRREGULAR MENSES: ICD-10-CM

## 2019-07-31 LAB — TSH SERPL DL<=0.005 MIU/L-ACNC: 2.1 MU/L (ref 0.4–4)

## 2019-07-31 PROCEDURE — 76830 TRANSVAGINAL US NON-OB: CPT | Performed by: OBSTETRICS & GYNECOLOGY

## 2019-07-31 PROCEDURE — 84443 ASSAY THYROID STIM HORMONE: CPT | Performed by: OBSTETRICS & GYNECOLOGY

## 2019-07-31 PROCEDURE — 36415 COLL VENOUS BLD VENIPUNCTURE: CPT | Performed by: OBSTETRICS & GYNECOLOGY

## 2019-07-31 PROCEDURE — 84270 ASSAY OF SEX HORMONE GLOBUL: CPT | Performed by: OBSTETRICS & GYNECOLOGY

## 2019-07-31 PROCEDURE — 84403 ASSAY OF TOTAL TESTOSTERONE: CPT | Performed by: OBSTETRICS & GYNECOLOGY

## 2019-07-31 PROCEDURE — 83520 IMMUNOASSAY QUANT NOS NONAB: CPT | Mod: 90 | Performed by: OBSTETRICS & GYNECOLOGY

## 2019-07-31 PROCEDURE — 99000 SPECIMEN HANDLING OFFICE-LAB: CPT | Performed by: OBSTETRICS & GYNECOLOGY

## 2019-08-02 LAB — MIS SERPL-MCNC: 0.55 NG/ML (ref 0.4–16.02)

## 2019-08-03 LAB
SHBG SERPL-SCNC: 18 NMOL/L (ref 30–135)
TESTOST FREE SERPL-MCNC: 0.8 NG/DL (ref 0.08–0.74)
TESTOST SERPL-MCNC: 33 NG/DL (ref 8–60)

## 2019-11-05 ENCOUNTER — TRANSCRIBE ORDERS (OUTPATIENT)
Dept: MATERNAL FETAL MEDICINE | Facility: CLINIC | Age: 29
End: 2019-11-05

## 2019-11-05 ENCOUNTER — PRENATAL OFFICE VISIT (OUTPATIENT)
Dept: NURSING | Facility: CLINIC | Age: 29
End: 2019-11-05
Payer: COMMERCIAL

## 2019-11-05 VITALS
TEMPERATURE: 98 F | HEIGHT: 63 IN | SYSTOLIC BLOOD PRESSURE: 122 MMHG | WEIGHT: 211 LBS | HEART RATE: 83 BPM | BODY MASS INDEX: 37.39 KG/M2 | DIASTOLIC BLOOD PRESSURE: 78 MMHG

## 2019-11-05 DIAGNOSIS — Z23 NEED FOR TDAP VACCINATION: ICD-10-CM

## 2019-11-05 DIAGNOSIS — Z34.90 SUPERVISION OF NORMAL PREGNANCY: Primary | ICD-10-CM

## 2019-11-05 DIAGNOSIS — O26.90 PREGNANCY RELATED CONDITION, ANTEPARTUM: Primary | ICD-10-CM

## 2019-11-05 LAB
ABO + RH BLD: NORMAL
ABO + RH BLD: NORMAL
ALBUMIN UR-MCNC: NEGATIVE MG/DL
APPEARANCE UR: CLEAR
BILIRUB UR QL STRIP: NEGATIVE
BLD GP AB SCN SERPL QL: NORMAL
BLOOD BANK CMNT PATIENT-IMP: NORMAL
COLOR UR AUTO: YELLOW
ERYTHROCYTE [DISTWIDTH] IN BLOOD BY AUTOMATED COUNT: 13.2 % (ref 10–15)
GLUCOSE UR STRIP-MCNC: NEGATIVE MG/DL
HCT VFR BLD AUTO: 38.8 % (ref 35–47)
HGB BLD-MCNC: 13.2 G/DL (ref 11.7–15.7)
HGB UR QL STRIP: ABNORMAL
KETONES UR STRIP-MCNC: NEGATIVE MG/DL
LEUKOCYTE ESTERASE UR QL STRIP: ABNORMAL
MCH RBC QN AUTO: 29.7 PG (ref 26.5–33)
MCHC RBC AUTO-ENTMCNC: 34 G/DL (ref 31.5–36.5)
MCV RBC AUTO: 87 FL (ref 78–100)
NITRATE UR QL: NEGATIVE
PH UR STRIP: 5 PH (ref 5–7)
PLATELET # BLD AUTO: 207 10E9/L (ref 150–450)
RBC # BLD AUTO: 4.44 10E12/L (ref 3.8–5.2)
SOURCE: ABNORMAL
SP GR UR STRIP: >1.03 (ref 1–1.03)
SPECIMEN EXP DATE BLD: NORMAL
UROBILINOGEN UR STRIP-ACNC: 0.2 EU/DL (ref 0.2–1)
WBC # BLD AUTO: 12.2 10E9/L (ref 4–11)

## 2019-11-05 PROCEDURE — 86850 RBC ANTIBODY SCREEN: CPT | Performed by: OBSTETRICS & GYNECOLOGY

## 2019-11-05 PROCEDURE — 87340 HEPATITIS B SURFACE AG IA: CPT | Performed by: OBSTETRICS & GYNECOLOGY

## 2019-11-05 PROCEDURE — 99207 ZZC NO CHARGE NURSE ONLY: CPT

## 2019-11-05 PROCEDURE — 36415 COLL VENOUS BLD VENIPUNCTURE: CPT | Performed by: OBSTETRICS & GYNECOLOGY

## 2019-11-05 PROCEDURE — 87086 URINE CULTURE/COLONY COUNT: CPT | Performed by: OBSTETRICS & GYNECOLOGY

## 2019-11-05 PROCEDURE — 86901 BLOOD TYPING SEROLOGIC RH(D): CPT | Performed by: OBSTETRICS & GYNECOLOGY

## 2019-11-05 PROCEDURE — 82306 VITAMIN D 25 HYDROXY: CPT | Performed by: OBSTETRICS & GYNECOLOGY

## 2019-11-05 PROCEDURE — 86762 RUBELLA ANTIBODY: CPT | Performed by: OBSTETRICS & GYNECOLOGY

## 2019-11-05 PROCEDURE — 86900 BLOOD TYPING SEROLOGIC ABO: CPT | Performed by: OBSTETRICS & GYNECOLOGY

## 2019-11-05 PROCEDURE — 81003 URINALYSIS AUTO W/O SCOPE: CPT | Performed by: OBSTETRICS & GYNECOLOGY

## 2019-11-05 PROCEDURE — 86780 TREPONEMA PALLIDUM: CPT | Performed by: OBSTETRICS & GYNECOLOGY

## 2019-11-05 PROCEDURE — 85027 COMPLETE CBC AUTOMATED: CPT | Performed by: OBSTETRICS & GYNECOLOGY

## 2019-11-05 PROCEDURE — 87389 HIV-1 AG W/HIV-1&-2 AB AG IA: CPT | Performed by: OBSTETRICS & GYNECOLOGY

## 2019-11-05 RX ORDER — PYRIDOXINE HCL (VITAMIN B6) 25 MG
25 TABLET ORAL DAILY
COMMUNITY
End: 2020-05-06

## 2019-11-05 SDOH — SOCIAL STABILITY: SOCIAL INSECURITY: WITHIN THE LAST YEAR, HAVE YOU BEEN AFRAID OF YOUR PARTNER OR EX-PARTNER?: NO

## 2019-11-05 SDOH — SOCIAL STABILITY: SOCIAL INSECURITY: WITHIN THE LAST YEAR, HAVE YOU BEEN HUMILIATED OR EMOTIONALLY ABUSED IN OTHER WAYS BY YOUR PARTNER OR EX-PARTNER?: NO

## 2019-11-05 SDOH — SOCIAL STABILITY: SOCIAL INSECURITY
WITHIN THE LAST YEAR, HAVE YOU BEEN KICKED, HIT, SLAPPED, OR OTHERWISE PHYSICALLY HURT BY YOUR PARTNER OR EX-PARTNER?: NO

## 2019-11-05 SDOH — HEALTH STABILITY: MENTAL HEALTH
STRESS IS WHEN SOMEONE FEELS TENSE, NERVOUS, ANXIOUS, OR CAN'T SLEEP AT NIGHT BECAUSE THEIR MIND IS TROUBLED. HOW STRESSED ARE YOU?: NOT AT ALL

## 2019-11-05 SDOH — SOCIAL STABILITY: SOCIAL INSECURITY
WITHIN THE LAST YEAR, HAVE TO BEEN RAPED OR FORCED TO HAVE ANY KIND OF SEXUAL ACTIVITY BY YOUR PARTNER OR EX-PARTNER?: NO

## 2019-11-05 ASSESSMENT — MIFFLIN-ST. JEOR: SCORE: 1651.22

## 2019-11-05 NOTE — PROGRESS NOTES
Important Information for Provider:     Patient presents for new ob teaching and labs, second pregnancy. History of SAB/D&C 5/24/19. IUI both pregnancies, last ultrasound was 10/28/19, viable pregnancy. Ultrasound and NOB with Dr Reese scheduled for 11/14/19. Handouts reviewed and given. Taking B6, Unisom for nausea. Ordered first trimester screening/NIPT. Blood pressure was taken manually 122/78    Prenatal OB Questionnaire  Patient supplied answers from flow sheet for:  Prenatal OB Questionnaire.  Past Medical History  Diabetes?: No  Hypertension : Yes, related to birth control  Heart disease, mitral valve prolapse or rheumatic fever?: No  An autoimmune disease such as lupus or rheumatoid arthritis?: No  Kidney disease or urinary tract infection?: No  Epilepsy, seizures or spells?: No  Migraine headaches?: No  A stroke or loss of function or sensation?: No  Any other neurological problems?: No  Have you ever been treated for depression?: No  Are you having problems with crying spells or loss of self-esteem?: No  Have you ever required psychiatric care?: No  Have you ever had hepatitis, liver disease or jaundice?: No  Have you been treated for blood clots in your veins, deep vein thrombosis, inflammation in the veins, thrombosis, phlebitis, pulmonary embolism or varicosities?: No  Have you had excessive bleeding after surgery or dental work?: No  Do you bleed more than other women after a cut or scratch?: No  Do you have a history of anemia?: No  Have you ever had thyroid problems or taken thyroid medication?: No   Do you have any endocrine problems?: No  Have you ever been in a major accident or suffered serious trauma?: No  Within the last year, has anyone hit, slapped, kicked or otherwise hurt you?: No  In the last year, has anyone forced you to have sex when you didn't want to?: No    Past Medical History 2   Have you ever received a blood transfusion?: No  Would you refuse a blood transfusion if a doctor  judged it to be medically necessary?: No   If you answered Yes, would you rather die than receive a blood transfusion?: No  If you answered Yes, is this for Restorationist reasons?: No  Does anyone in your home smoke?: No  Do you use tobacco products?: No  Do you drink beer, wine or hard liquor?: No  Do you use any of the following: marijuana, speed, cocaine, heroin, hallucinogens or other drugs?: No   Is your blood type Rh negative?: No  Have you ever had abnormal antibodies in your blood?: No  Have you ever had asthma?: No  Have you ever had tuberculosis?: No  Do you have any allergies to drugs or over-the-counter medications?: No  Allergies: Dust Mites, Aspartame, Ethanol, Venlafaxine, Hydrochloride, Sertraline: No  Have you had any breast problems?: No  Have you ever ?: No  Have you had any gynecological surgical procedures such as cervical conization, a LEEP procedure, laser treatment, cryosurgery of the cervix or a dilation and curettage, etc?: (!) Yes(D&C 5/24/19)  Have you ever had any other surgical procedures?: Yes, ACL repair  Have you been hospitalized for a nonsurgical reason excluding normal delivery?: No  Have you ever had any anesthetic complications?: No  Have you ever had an abnormal pap smear?: No    Past Medical History (Continued)  Do you have a history of abnormalities of the uterus?: No  Did your mother take RAÚL or any other hormones when she was pregnant with you?: No  Did it take you more than a year to become pregnant?: No  Have you ever been evaluated or treated for infertility?: Yes  Is there a history of medical problems in your family, which you feel may be important to this pregnancy?: No  Do you have any other problems we have not asked about which you feel may be important to this pregnancy?: No    Symptoms since last menstrual period  Do you have any of the following symptoms: abdominal pain, blood in stools or urine, chest pain, shortness of breath, coughing or vomiting up  blood, your heart racing or skipping beats, nausea and vomiting, pain on urination or vaginal discharge or bleed: (!) Yes  Will the patient be 35 years old or older at the time of delivery?: No    Has the patient, baby's father or anyone in either family had:  Thalassemia (Italian, Greek, Mediterranean or  background only) and an MCV result less than 80?: No  Neural tube defect such as meningomyelocele, spina bifida or anencephaly?: No  Congenital heart defect?: No  Down's Syndrome?: No  Reece-Sachs disease ([place], [place]un, [place])?: No  Sickle cell disease or trait ()?: No  Hemophilia or other inherited problems of blood?: No  Muscular dystrophy?: No  Cystic fibrosis?: No  [place]'s chorea?: No  Mental retardation/autism?: No  If yes, was the person tested for fragile X?: No  Any other inherited genetic or chromosomal disorder?: No  Maternal metabolic disorder (e.g Insulin-dependent diabetes, PKU)?: No  A child with birth defects not listed above?: No  Recurrent pregnancy loss or stillbirth?: No   Has the patient had any medications/street drugs/alcohol since her last menstrual period?: No  Does the patient or baby's father have any other genetic risks?: No    Infection History   Do you object to being tested for Hepatitis B?: No  Do you object to being tested for HIV?: No   Do you feel that you are at high risk for coming in contact with the AIDS virus?: No  Have you ever been treated for tuberculosis?: No  Have you ever had a positive skin test for tuberculosis?: No  Do you live with someone who has tuberculosis?: No  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, patient is a nurse  Do you have genital herpes?: No  Does your partner have genital herpes?: No  Have you had a viral illness since your last period?: No  Have you ever had gonorrhea, chlamydia, syphilis, venereal warts, trichomoniasis, pelvic inflammatory disease or any other sexually transmitted disease?: No  Do you know if you  are a genital group B streptococcus carrier?: No  Have you had chicken pox/varicella?: (!) Yes   Have you been vaccinated against chicken Pox?: No  Have you had any other infectious diseases?: No      Allergies as of 11/5/2019:    Allergies as of 11/05/2019 - Reviewed 06/04/2019   Allergen Reaction Noted     Nkda [no known drug allergies]  11/08/2013       Current medications are:  Current Outpatient Medications   Medication Sig Dispense Refill     acetaminophen (TYLENOL) 500 MG tablet Take 500-1,000 mg by mouth every 6 hours as needed for mild pain       Doxylamine Succinate, Sleep, (UNISOM PO) Take 25 mg by mouth nightly as needed        Prenatal Vit-Fe Fumarate-FA (PRENATAL VITAMIN PO) Take 1 tablet by mouth daily        vitamin D3 (CHOLECALCIFEROL) 2000 units tablet Take 1 tablet by mouth daily           Early ultrasound screening tool:    Does patient have irregular periods?  No  Did patient use hormonal birth control in the three months prior to positive urine pregnancy test? No  Is the patient breastfeeding?  No  Is the patient 10 weeks or greater at time of education visit?  No

## 2019-11-06 LAB
BACTERIA SPEC CULT: NORMAL
DEPRECATED CALCIDIOL+CALCIFEROL SERPL-MC: 47 UG/L (ref 20–75)
HBV SURFACE AG SERPL QL IA: NONREACTIVE
HIV 1+2 AB+HIV1 P24 AG SERPL QL IA: NONREACTIVE
RUBV IGG SERPL IA-ACNC: 32 IU/ML
SPECIMEN SOURCE: NORMAL
T PALLIDUM AB SER QL: NONREACTIVE

## 2019-11-13 ENCOUNTER — TELEPHONE (OUTPATIENT)
Dept: OBGYN | Facility: CLINIC | Age: 29
End: 2019-11-13

## 2019-11-13 NOTE — TELEPHONE ENCOUNTER
Patient called asking if the PA has been filled out for genetic testing. Informed pt that we were unaware that this needed to be done to call the insurance company and have them fax us paperwork since there is nothing in the chart. Pt called back stating that [employer] stated that we should go online and print the form and fax it to them. Asked pt which testing it's for. PA needed for NIPT testing.   Patient does not meet any of the clinical qualifications for the NIPT to be covered - not over the age of 35, no genetic history. TC to patient and left message NIPT test not covered by insurance.   Danae Mota, RN-BSN

## 2019-11-14 ENCOUNTER — ANCILLARY PROCEDURE (OUTPATIENT)
Dept: ULTRASOUND IMAGING | Facility: CLINIC | Age: 29
End: 2019-11-14
Attending: OBSTETRICS & GYNECOLOGY
Payer: COMMERCIAL

## 2019-11-14 ENCOUNTER — PRENATAL OFFICE VISIT (OUTPATIENT)
Dept: OBGYN | Facility: CLINIC | Age: 29
End: 2019-11-14
Payer: COMMERCIAL

## 2019-11-14 VITALS
HEIGHT: 63 IN | OXYGEN SATURATION: 100 % | SYSTOLIC BLOOD PRESSURE: 118 MMHG | BODY MASS INDEX: 37.12 KG/M2 | DIASTOLIC BLOOD PRESSURE: 86 MMHG | WEIGHT: 209.5 LBS | HEART RATE: 108 BPM

## 2019-11-14 DIAGNOSIS — Z34.00 SUPERVISION OF NORMAL FIRST PREGNANCY, ANTEPARTUM: Primary | ICD-10-CM

## 2019-11-14 DIAGNOSIS — O09.291 CURRENT PREGNANCY IN FIRST TRIMESTER WITH HISTORY OF SPONTANEOUS ABORTION DURING PRIOR PREGNANCY: ICD-10-CM

## 2019-11-14 PROCEDURE — 76815 OB US LIMITED FETUS(S): CPT | Performed by: OBSTETRICS & GYNECOLOGY

## 2019-11-14 PROCEDURE — 99207 ZZC FIRST OB VISIT: CPT | Performed by: OBSTETRICS & GYNECOLOGY

## 2019-11-14 ASSESSMENT — MIFFLIN-ST. JEOR: SCORE: 1644.42

## 2019-11-14 NOTE — PROGRESS NOTES
"Dates by conception on letrozole with IUI consistent with 7-week ultrasound.  Slight nausea but otherwise feeling well so has been concerned about this pregnancy.  Happy to see fetal heartbeat and growth on today's ultrasound.  Has first trimester screen schedule but may need to cancel if not covered by insurance.  Discussed quad and anatomy ultrasound was ordered.  Providers/residents, weight gain/exercise, delivery discussed.  She will start a baby aspirin daily because of her obesity and first pregnancy. RTC 3 weeks for BSUS check if first trimester is not covered. BE    HPI:  Liyah Rodriguez is a 29 year old female No LMP recorded. Patient is pregnant. at 9w5d, Estimated Date of Delivery: Jun 13, 2020.  She denies vaginal bleeding and abdominal pain. Happy but nervous about this pregnancy. Here with spouse.    No other c/o.    Past Medical History:   Diagnosis Date     NO ACTIVE PROBLEMS        Past Surgical History:   Procedure Laterality Date     ACL repair  06/2008     DILATION AND CURETTAGE SUCTION N/A 5/24/2019    Procedure: Suction Dilation and Curettage   (9 Weeks and 5 Days);  Surgeon: Coni Barnett MD;  Location: [facility] OR     ORTHOPEDIC SURGERY Right     knee surgery     Right lateral menisectomy  06/2008     XR HYSTEROSALPINGOGRAM  02/08/2019    normal fill and spill       Allergies: Nkda [no known drug allergies]     EXAM:  Blood pressure 118/86, pulse 108, height 1.6 m (5' 3\"), weight 95 kg (209 lb 8 oz), SpO2 100 %, not currently breastfeeding.   BMI= Body mass index is 37.11 kg/m .  General - pleasant female in no acute distress.  Neck - supple without lymphadenopathy or thyromegaly.  Lungs - clear to auscultation bilaterally.  Heart - regular rate and rhythm without murmur.  Breast - no nodularity, asymmetry or nipple discharge bilaterally.  Abdomen - soft, nontender, nondistended.  Pelvic -deferred.  Musculoskeletal - no gross deformities.  Neurological - normal strength, " sensation, and mental status.    Doptones were not attempted, came from ultrasound and     ASSESSMENT/PLAN:  (Z34.00) Supervision of normal first pregnancy, antepartum  (primary encounter diagnosis)  Comment: letrozole with IUI  Plan: US OB > 14 Weeks        Anatomy ultrasound was ordered and they plan to find out gender.  Discussed return to clinic if she is ever nervous and we can always do a Doptone check.    Weight gain and exercise during pregnancy was discussed at today's visit.  The patient will return to clinic in 4 weeks for continued prenatal care.

## 2019-11-24 ENCOUNTER — HOSPITAL ENCOUNTER (EMERGENCY)
Facility: CLINIC | Age: 29
Discharge: HOME | End: 2019-11-24
Attending: EMERGENCY MEDICINE | Admitting: EMERGENCY MEDICINE
Payer: COMMERCIAL

## 2019-11-24 ENCOUNTER — NURSE TRIAGE (OUTPATIENT)
Dept: NURSING | Facility: CLINIC | Age: 29
End: 2019-11-24

## 2019-11-24 VITALS
TEMPERATURE: 96.8 F | RESPIRATION RATE: 16 BRPM | OXYGEN SATURATION: 99 % | DIASTOLIC BLOOD PRESSURE: 105 MMHG | SYSTOLIC BLOOD PRESSURE: 125 MMHG | HEART RATE: 78 BPM

## 2019-11-24 DIAGNOSIS — Z3A.11 11 WEEKS GESTATION OF PREGNANCY: ICD-10-CM

## 2019-11-24 DIAGNOSIS — O10.011 HYPERTENSION IN PREGNANCY, ESSENTIAL, ANTEPARTUM, FIRST TRIMESTER: ICD-10-CM

## 2019-11-24 LAB
ALBUMIN SERPL-MCNC: 3.6 G/DL (ref 3.4–5)
ALBUMIN UR-MCNC: NEGATIVE MG/DL
ALP SERPL-CCNC: 60 U/L (ref 40–150)
ALT SERPL W P-5'-P-CCNC: 26 U/L (ref 0–50)
ANION GAP SERPL CALCULATED.3IONS-SCNC: 7 MMOL/L (ref 3–14)
APPEARANCE UR: CLEAR
AST SERPL W P-5'-P-CCNC: 20 U/L (ref 0–45)
BACTERIA #/AREA URNS HPF: ABNORMAL /HPF
BASOPHILS # BLD AUTO: 0 10E9/L (ref 0–0.2)
BASOPHILS NFR BLD AUTO: 0.3 %
BILIRUB SERPL-MCNC: 0.3 MG/DL (ref 0.2–1.3)
BILIRUB UR QL STRIP: NEGATIVE
BUN SERPL-MCNC: 11 MG/DL (ref 7–30)
CALCIUM SERPL-MCNC: 8.5 MG/DL (ref 8.5–10.1)
CHLORIDE SERPL-SCNC: 107 MMOL/L (ref 94–109)
CO2 SERPL-SCNC: 23 MMOL/L (ref 20–32)
COLOR UR AUTO: YELLOW
CREAT SERPL-MCNC: 0.64 MG/DL (ref 0.52–1.04)
CREAT UR-MCNC: 79 MG/DL
DIFFERENTIAL METHOD BLD: ABNORMAL
EOSINOPHIL # BLD AUTO: 0.1 10E9/L (ref 0–0.7)
EOSINOPHIL NFR BLD AUTO: 0.8 %
ERYTHROCYTE [DISTWIDTH] IN BLOOD BY AUTOMATED COUNT: 12.7 % (ref 10–15)
GFR SERPL CREATININE-BSD FRML MDRD: >90 ML/MIN/{1.73_M2}
GLUCOSE SERPL-MCNC: 75 MG/DL (ref 70–99)
GLUCOSE UR STRIP-MCNC: NEGATIVE MG/DL
HCT VFR BLD AUTO: 40.8 % (ref 35–47)
HGB BLD-MCNC: 13.9 G/DL (ref 11.7–15.7)
HGB UR QL STRIP: NEGATIVE
IMM GRANULOCYTES # BLD: 0.1 10E9/L (ref 0–0.4)
IMM GRANULOCYTES NFR BLD: 0.6 %
KETONES UR STRIP-MCNC: NEGATIVE MG/DL
LEUKOCYTE ESTERASE UR QL STRIP: ABNORMAL
LYMPHOCYTES # BLD AUTO: 3.3 10E9/L (ref 0.8–5.3)
LYMPHOCYTES NFR BLD AUTO: 25.9 %
MCH RBC QN AUTO: 30.2 PG (ref 26.5–33)
MCHC RBC AUTO-ENTMCNC: 34.1 G/DL (ref 31.5–36.5)
MCV RBC AUTO: 89 FL (ref 78–100)
MONOCYTES # BLD AUTO: 0.8 10E9/L (ref 0–1.3)
MONOCYTES NFR BLD AUTO: 6.4 %
MUCOUS THREADS #/AREA URNS LPF: PRESENT /LPF
NEUTROPHILS # BLD AUTO: 8.4 10E9/L (ref 1.6–8.3)
NEUTROPHILS NFR BLD AUTO: 66 %
NITRATE UR QL: NEGATIVE
NRBC # BLD AUTO: 0 10*3/UL
NRBC BLD AUTO-RTO: 0 /100
PH UR STRIP: 5.5 PH (ref 5–7)
PLATELET # BLD AUTO: 249 10E9/L (ref 150–450)
POTASSIUM SERPL-SCNC: 3.4 MMOL/L (ref 3.4–5.3)
PROT SERPL-MCNC: 7.2 G/DL (ref 6.8–8.8)
PROT UR-MCNC: 0.08 G/L
PROT/CREAT 24H UR: 0.1 G/G CR (ref 0–0.2)
RBC # BLD AUTO: 4.61 10E12/L (ref 3.8–5.2)
RBC #/AREA URNS AUTO: 1 /HPF (ref 0–2)
SODIUM SERPL-SCNC: 137 MMOL/L (ref 133–144)
SOURCE: ABNORMAL
SP GR UR STRIP: 1.02 (ref 1–1.03)
SQUAMOUS #/AREA URNS AUTO: 4 /HPF (ref 0–1)
URATE SERPL-MCNC: 3.7 MG/DL (ref 2.6–6)
UROBILINOGEN UR STRIP-MCNC: NORMAL MG/DL (ref 0–2)
WBC # BLD AUTO: 12.7 10E9/L (ref 4–11)
WBC #/AREA URNS AUTO: 7 /HPF (ref 0–5)

## 2019-11-24 PROCEDURE — 99283 EMERGENCY DEPT VISIT LOW MDM: CPT

## 2019-11-24 PROCEDURE — 81001 URINALYSIS AUTO W/SCOPE: CPT | Performed by: EMERGENCY MEDICINE

## 2019-11-24 PROCEDURE — 99283 EMERGENCY DEPT VISIT LOW MDM: CPT | Mod: Z6 | Performed by: EMERGENCY MEDICINE

## 2019-11-24 PROCEDURE — 84156 ASSAY OF PROTEIN URINE: CPT | Performed by: STUDENT IN AN ORGANIZED HEALTH CARE EDUCATION/TRAINING PROGRAM

## 2019-11-24 PROCEDURE — 80053 COMPREHEN METABOLIC PANEL: CPT | Performed by: EMERGENCY MEDICINE

## 2019-11-24 PROCEDURE — 84550 ASSAY OF BLOOD/URIC ACID: CPT | Performed by: EMERGENCY MEDICINE

## 2019-11-24 PROCEDURE — 85025 COMPLETE CBC W/AUTO DIFF WBC: CPT | Performed by: EMERGENCY MEDICINE

## 2019-11-24 PROCEDURE — 99213 OFFICE O/P EST LOW 20 MIN: CPT | Mod: GC | Performed by: OBSTETRICS & GYNECOLOGY

## 2019-11-24 ASSESSMENT — ENCOUNTER SYMPTOMS
WEAKNESS: 0
ABDOMINAL PAIN: 0
CHILLS: 0
NUMBNESS: 0
COUGH: 0
HEADACHES: 1
SHORTNESS OF BREATH: 0
VOMITING: 0

## 2019-11-24 NOTE — ED TRIAGE NOTES
Pt states she is 11 wks preg and since she woke up today she has been having HA and then noticed this evening her BP is up.  Pt states no hx of that in the past.

## 2019-11-24 NOTE — CONSULTS
Gynecology Consult Note    Liyah Rodriguez  MRN: [id number]  : 1990    Consulting Provider: Dr. Hdez  Consulting Service: Emergency Department    Chief Complaint: Headache, elevated BP    History of Present Illness:   Liyah Rodriguez is a 29 year old  at 11w1d by IUI dating who presents with a headache and elevated BP. 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 The headache was severe and wrapped around to the back of her head. She also reported a tingling feeling, which she has never had before. She discussed with an MD who recommended she check her BP. This was elevated in the 140s. She denies nausea, vomiting, fevers, chills, vision changes, chest pain, SOB, abdominal pain, constipation, diarrhea, vaginal bleeding, cramping.     Gyn Hx:   - S/p SAB x1, had D&C    OB Hx:   OB History    Para Term  AB Living   2 0 0 0 1 0   SAB TAB Ectopic Multiple Live Births   1 0 0 0 0      # Outcome Date GA Lbr Ian/2nd Weight Sex Delivery Anes PTL Lv   2 Current            1 SAB 19 9w5d    AB, COMPLETE         Birth Comments: D&C     ROS: Otherwise negative unless stated in HPI    Past Medical History:  Past Medical History:   Diagnosis Date     NO ACTIVE PROBLEMS      Past Surgical History:   Past Surgical History:   Procedure Laterality Date     ACL repair  2008     DILATION AND CURETTAGE SUCTION N/A 2019    Procedure: Suction Dilation and Curettage   (9 Weeks and 5 Days);  Surgeon: Coni Barnett MD;  Location: [facility]     ORTHOPEDIC SURGERY Right     knee surgery     Right lateral menisectomy  2008     XR HYSTEROSALPINGOGRAM  2019    normal fill and spill     Social History:  Social History     Tobacco Use     Smoking status: Never Smoker     Smokeless tobacco: Never Used   Substance Use Topics     Alcohol use: None     Comment: occ     Drug use: None     Family History:   Family History   Problem  Relation Age of Onset     Heart Disease Paternal Grandmother         CHF     Hypertension Mother      Hypertension Maternal Grandmother      High cholesterol Maternal Grandmother      Cancer Maternal Grandfather         lung cancer     Cancer Father 50        lymphoma     Aneurysm Father 63        spotty vision, occasional weakness prior- then sudden death, smoker     Allergies:   Allergies   Allergen Reactions     Nkda [No Known Drug Allergies]      Medications:  PNV  Unisom, B6    Physical Exam:  Patient Vitals for the past 24 hrs:   BP Temp Temp src Pulse Heart Rate Resp SpO2   11/24/19 0345 (!) 125/105 -- -- -- 79 16 99 %   11/24/19 0330 (!) 140/89 -- -- -- 81 17 100 %   11/24/19 0315 (!) 142/89 -- -- -- 93 30 100 %   11/24/19 0300 (!) 135/94 -- -- -- 79 20 98 %   11/24/19 0215 (!) 137/102 -- -- -- -- -- --   11/24/19 0119 (!) 166/97 96.8  F (36  C) Oral 78 -- 18 100 %     General: NAD, appears comfortable  Resp: no respiratory distress  CV: regular rate  Abdomen: soft, non-distended, non-tender.  : deferred  MSK: no CVAT bilaterally.  Ext: Warm, well perfused, trace edema  Neuro: appears intact grossly moving all 4 extremities equally.    Labs:  Results for orders placed or performed during the hospital encounter of 11/24/19   UA with Microscopic reflex to Culture     Status: Abnormal   Result Value Ref Range    Color Urine Yellow     Appearance Urine Clear     Glucose Urine Negative NEG^Negative mg/dL    Bilirubin Urine Negative NEG^Negative    Ketones Urine Negative NEG^Negative mg/dL    Specific Gravity Urine 1.019 1.003 - 1.035    Blood Urine Negative NEG^Negative    pH Urine 5.5 5.0 - 7.0 pH    Protein Albumin Urine Negative NEG^Negative mg/dL    Urobilinogen mg/dL Normal 0.0 - 2.0 mg/dL    Nitrite Urine Negative NEG^Negative    Leukocyte Esterase Urine Small (A) NEG^Negative    Source Urine     WBC Urine 7 (H) 0 - 5 /HPF    RBC Urine 1 0 - 2 /HPF    Bacteria Urine Few (A) NEG^Negative /HPF    Squamous  Epithelial /HPF Urine 4 (H) 0 - 1 /HPF    Mucous Urine Present (A) NEG^Negative /LPF   CBC with platelets differential     Status: Abnormal   Result Value Ref Range    WBC 12.7 (H) 4.0 - 11.0 10e9/L    RBC Count 4.61 3.8 - 5.2 10e12/L    Hemoglobin 13.9 11.7 - 15.7 g/dL    Hematocrit 40.8 35.0 - 47.0 %    MCV 89 78 - 100 fl    MCH 30.2 26.5 - 33.0 pg    MCHC 34.1 31.5 - 36.5 g/dL    RDW 12.7 10.0 - 15.0 %    Platelet Count 249 150 - 450 10e9/L    Diff Method Automated Method     % Neutrophils 66.0 %    % Lymphocytes 25.9 %    % Monocytes 6.4 %    % Eosinophils 0.8 %    % Basophils 0.3 %    % Immature Granulocytes 0.6 %    Nucleated RBCs 0 0 /100    Absolute Neutrophil 8.4 (H) 1.6 - 8.3 10e9/L    Absolute Lymphocytes 3.3 0.8 - 5.3 10e9/L    Absolute Monocytes 0.8 0.0 - 1.3 10e9/L    Absolute Eosinophils 0.1 0.0 - 0.7 10e9/L    Absolute Basophils 0.0 0.0 - 0.2 10e9/L    Abs Immature Granulocytes 0.1 0 - 0.4 10e9/L    Absolute Nucleated RBC 0.0    Comprehensive metabolic panel     Status: None   Result Value Ref Range    Sodium 137 133 - 144 mmol/L    Potassium 3.4 3.4 - 5.3 mmol/L    Chloride 107 94 - 109 mmol/L    Carbon Dioxide 23 20 - 32 mmol/L    Anion Gap 7 3 - 14 mmol/L    Glucose 75 70 - 99 mg/dL    Urea Nitrogen 11 7 - 30 mg/dL    Creatinine 0.64 0.52 - 1.04 mg/dL    GFR Estimate >90 >60 mL/min/[1.73_m2]    GFR Estimate If Black >90 >60 mL/min/[1.73_m2]    Calcium 8.5 8.5 - 10.1 mg/dL    Bilirubin Total 0.3 0.2 - 1.3 mg/dL    Albumin 3.6 3.4 - 5.0 g/dL    Protein Total 7.2 6.8 - 8.8 g/dL    Alkaline Phosphatase 60 40 - 150 U/L    ALT 26 0 - 50 U/L    AST 20 0 - 45 U/L   Uric acid     Status: None   Result Value Ref Range    Uric Acid 3.7 2.6 - 6.0 mg/dL   Protein  random urine with Creat Ratio     Status: None   Result Value Ref Range    Protein Random Urine 0.08 g/L    Protein Total Urine g/gr Creatinine 0.10 0 - 0.2 g/g Cr   Creatinine urine calculation only     Status: None   Result Value Ref Range     Creatinine Urine 79 mg/dL     Imaging:  BSUS: normal cardiac activity    Impression:  Liyah Rodriguez is a 29 year old  female at 11w1d presenting with a headache and elevated BP. On review of her chart, it appears that she has chronic hypertension. She did report a history of high blood pressure while taking COCs, but that her BPs improved once she was on POPs. We discussed that pregnancy is a common time for chronic hypertension to manifest. Reviewed that her headache is likely unrelated as there are multiple reasons people can have a headaches.  Reviewed typical comfort measures for headaches. Patient overall reassured by this.      Plan:   - Recommended tylenol, heat packs, benadryl PRN for headaches  - Recommend follow-up in clinic this week to determine if medications are indicated, at this point would recommend antihypertensives with severe range blood pressure or persistent high mild range blood pressure.  - Baseline HELLP labs obtained today and normal  - Recommended starting baby ASA at 12w     Thank you for allowing us to be involved in the care of your patient. Please do not hesitate to give us a call with further questions.     Team OB/GYN Consult pagers: [phone] from 6:30AM to 6:30PM, [phone] from 6PM to 6:30AM.    Patient seen and care plan discussed under supervision of Dr. Garnica.      Francoise Us MD  OBGYN Resident PGY3  2019 4:30 AM     Physician Attestation   I, Arabella Garnica MD, saw this patient with the resident and agree with the resident/fellow's findings and plan of care as documented in the note.      I personally reviewed vital signs, medications and labs.    Jiménez findings: Liyah Rodriguez is a 29 year old  at 11w1d by 7w2d ultrasound who presents for headache and elevated blood pressure.  Patient with chronic hypertension based on chart review.  Baseline HELLP labs collected by ED today and were WNL.  Single severe range blood pressure and all  others mid range.  Will not start antihypertensive now, but will have patient follow up in clinic on Mon or Tuesday for BP check.  Message sent to clinic RNs to schedule.    Arabella Garnica MD  Date of Service (when I saw the patient): 11/24/19

## 2019-11-24 NOTE — TELEPHONE ENCOUNTER
"Caller is 11 weeks pregnant and MARGIE is 06[birth year]/20. Caller states she is having a headache and has taken tylenol with no relief. Caller states her blood pressure is 146/101. Triage guidelines recommend to go to ED. Caller verbalized and understands directives.    Reason for Disposition    Patient sounds very sick or weak to the triager    Additional Information    Negative: Difficult to awaken or acting confused (e.g., disoriented, slurred speech)    Negative: [1] Weakness of the face, arm or leg on one side of the body AND [2] new onset    Negative: [1] Numbness of the face, arm or leg on one side of the body AND [2] new onset    Negative: [1] Loss of speech or garbled speech AND [2] new onset    Negative: Sounds like a life-threatening emergency to the triager    Negative: Followed a head injury within last 3 days    Negative: Traumatic Brain Injury (TBI) is suspected    Negative: Sinus pain of forehead and yellow or green nasal discharge    Negative: Unable to walk, or can only walk with assistance (e.g., requires support)    Negative: Stiff neck (can't touch chin to chest)    Negative: Severe pain in one eye    Negative: [1] Other family members (or roommates) with headaches AND [2] possibility of carbon monoxide exposure    Negative: [1] SEVERE headache (e.g., excruciating) AND [2] \"worst headache\" of life    Negative: [1] SEVERE headache AND [2] sudden-onset (i.e., reaching maximum intensity within seconds)    Negative: [1] SEVERE headache AND [2] fever    Negative: Loss of vision or double vision (Exception: similar to previous migraines)    Negative: [1] Fever > 100.0 F (37.8 C) AND [2] diabetes mellitus or weak immune system (e.g., HIV positive, cancer chemo, splenectomy, chronic steroids)    Negative: [1] Pregnant > 20 weeks AND [2] new hand or face swelling    Negative: [1] Pregnant > 20 weeks AND [2] new blurred vision or vision changes    Negative: [1] Pregnant > 20 weeks AND [2] sudden weight gain (i.e., " more than 3 lbs or 1.4 kg in one week)    Negative: [1] Pregnant 23 or more weeks AND [2] baby is moving less today (e.g., kick count < 5 in 1 hour or < 10 in 2 hours)    Negative: [1] SEVERE headache (e.g., excruciating) AND [2] not improved after 2 hours of pain medicine (Exception: similar to previous migraines)    Negative: [1] Vomiting AND [2] 2 or more times (Exception: similar to previous migraines)    Negative: Fever > 104 F (40 C)    Negative: [1] MODERATE headache (e.g., interferes with normal activities) AND [2] present > 24 hours AND [3] unexplained  (Exceptions: analgesics not tried, typical migraine, or headache part of viral illness)    Negative: [1] New headache AND [2] HIV positive    Negative: [1] Sinus pain of forehead AND [2] yellow or green nasal discharge    Negative: Fever present > 3 days (72 hours)    Negative: [1] MILD-MODERATE headache AND [2] present > 72 hours    Negative: Headache is a chronic symptom (recurrent or ongoing AND present > 4 weeks)    Negative: Similar to previously diagnosed migraine headaches    Negative: [1] Headache AND [2] has not taken pain medications    Negative: [1] Headache AND [2] part of viral illness AND [3] present < 72 hours    Protocols used: PREGNANCY - HEADACHE-A-AH

## 2019-11-24 NOTE — ED AVS SNAPSHOT
[facility], [place], Emergency Department  [address]  [place] [address]  Phone:  [phone]  Fax:  [phone]                                    Liyah Rodriguez   MRN: [id number]    Department:  [facility], Emergency Department   Date of Visit:  11/24/2019           After Visit Summary Signature Page    I have received my discharge instructions, and my questions have been answered. I have discussed any challenges I see with this plan with the nurse or doctor.    ..........................................................................................................................................  Patient/Patient Representative Signature      ..........................................................................................................................................  Patient Representative Print Name and Relationship to Patient    ..................................................               ................................................  Date                                   Time    ..........................................................................................................................................  Reviewed by Signature/Title    ...................................................              ..............................................  Date                                               Time          22EPIC Rev 08/18

## 2019-11-24 NOTE — ED PROVIDER NOTES
[facility] EMERGENCY DEPARTMENT ([facility])    11/24/19        History     Chief Complaint   Patient presents with     Headache     Hypertension     The history is provided by the patient and medical records.     Liyah Rodriguez is a 29 year old pregnant female at approximately 11 weeks gestation with history of miscarriage in 06/2019 who presents to the Emergency Department for evaluation of severe headache and elevated blood pressure.  Patient states that she woke up today with a 6/10 head pain that she localizes to her left side along with tingling of her scalp. The patient works as a [employer] nearby and decided to take her blood pressure and measured 144/101. She became concerned, called the nurse triage, and decided to visit the ED for further evaluation. Patient denies a history of hypertension and notes that her blood pressure only rises in response to her taking estrogen supplements.  The patient reports taking two doses of Tylenol earlier today with no improvement of her symptoms. She denies taking any other medications. The patient denies a history of frequent migraines and notes that she has one headache a month. The patient reports that her last ultrasound was on 11/14/19.     Here in the ED, patient rates severity of headache as 4/10. She denies any vomiting, chills, weakness. No cough. Patient denies numbness but reports tingling near her scalp. No chest pain, shortness of breath, or leg swelling. No abdominal pain. Patient states that her last ultrasound was two Thursdays ago.       I have reviewed the Medications, Allergies, Past Medical and Surgical History, and Social History in the [employer] system.  Past Medical History:   Diagnosis Date     NO ACTIVE PROBLEMS        Review of Systems   Constitutional: Negative for chills, diaphoresis, fatigue and fever.   HENT: Negative for congestion, facial swelling, mouth sores, nosebleeds, rhinorrhea, sinus pain and sore throat.    Respiratory:  Negative for cough and shortness of breath.    Cardiovascular: Negative for chest pain, palpitations and leg swelling.   Gastrointestinal: Negative for abdominal pain, diarrhea, nausea and vomiting.   Musculoskeletal: Negative for arthralgias, gait problem, myalgias, neck pain and neck stiffness.   Skin: Negative for rash and wound.   Allergic/Immunologic: Negative for immunocompromised state.   Neurological: Positive for headaches (left-sided). Negative for dizziness, tremors, seizures, syncope, facial asymmetry, speech difficulty, weakness, light-headedness and numbness.   Hematological: Does not bruise/bleed easily.   Psychiatric/Behavioral: Negative for confusion.   All other systems reviewed and are negative.      Physical Exam   BP: (!) 166/97  Pulse: 78  Heart Rate: 79  Temp: 96.8  F (36  C)  Resp: 18  SpO2: 100 %      Physical Exam  Vitals signs and nursing note reviewed.   Constitutional:       General: She is not in acute distress.     Appearance: Normal appearance. She is not ill-appearing or diaphoretic.   HENT:      Head: Normocephalic and atraumatic.      Nose: Nose normal.      Mouth/Throat:      Mouth: Mucous membranes are moist.      Pharynx: Oropharynx is clear. No oropharyngeal exudate.   Eyes:      General: No scleral icterus.     Extraocular Movements: Extraocular movements intact.      Conjunctiva/sclera: Conjunctivae normal.      Pupils: Pupils are equal, round, and reactive to light.      Comments: Normal fundi   Neck:      Musculoskeletal: Normal range of motion and neck supple. No neck rigidity.   Cardiovascular:      Rate and Rhythm: Normal rate and regular rhythm.      Pulses: Normal pulses.      Heart sounds: Normal heart sounds.   Pulmonary:      Effort: Pulmonary effort is normal. No respiratory distress.      Breath sounds: Normal breath sounds.   Abdominal:      General: Bowel sounds are normal.      Palpations: Abdomen is soft.      Tenderness: There is no abdominal tenderness.    Genitourinary:     Comments: FHT per OB  Musculoskeletal: Normal range of motion.         General: No swelling or tenderness.   Skin:     General: Skin is warm and dry.      Findings: No rash.   Neurological:      General: No focal deficit present.      Mental Status: She is alert and oriented to person, place, and time.      Cranial Nerves: Cranial nerves are intact.      Sensory: Sensation is intact.      Motor: Motor function is intact.      Coordination: Coordination is intact.      Deep Tendon Reflexes: Reflexes are normal and symmetric.   Psychiatric:         Mood and Affect: Mood normal.         Behavior: Behavior normal.         ED Course   1:41 AM  The patient was seen and examined by Mark Hdez in Room ED02.        Procedures             Critical Care time:  none             Labs Ordered and Resulted from Time of ED Arrival Up to the Time of Departure from the ED   ROUTINE UA WITH MICROSCOPIC REFLEX TO CULTURE - Abnormal; Notable for the following components:       Result Value    Leukocyte Esterase Urine Small (*)     WBC Urine 7 (*)     Bacteria Urine Few (*)     Squamous Epithelial /HPF Urine 4 (*)     Mucous Urine Present (*)     All other components within normal limits   CBC WITH PLATELETS DIFFERENTIAL - Abnormal; Notable for the following components:    WBC 12.7 (*)     Absolute Neutrophil 8.4 (*)     All other components within normal limits   COMPREHENSIVE METABOLIC PANEL   URIC ACID   MAY SALINE LOCK IV            Assessments & Plan (with Medical Decision Making)   Hypertension in pregnancy. HELLP labs normal. OB consult-see note. Blood pressure improved. No evidence of hypertension emergency. See OB note for disposition and plan. Headache is mild. No evidence of other severe underlying causes including intracranial hemorrhage or infection, migraine, tension headache, cluster headaches, cerebrovascular disease, toxin related, caffeine headaches or other. She is to follow up in OB clinic  this week and to return if persistent symptoms.    I have reviewed the nursing notes.    I have reviewed the findings, diagnosis, plan and need for follow up with the patient.    Discharge Medication List as of 11/24/2019  3:48 AM          Final diagnoses:   Hypertension in pregnancy, essential, antepartum, first trimester   Lucero BLACKWELL, am serving as a trained medical scribe to document services personally performed by Mark Hdez MD, based on the provider's statements to me.      Mark BLACKWELL MD, was physically present and have reviewed and verified the accuracy of this note documented by Lucero Noble.       11/24/2019   [facility], [place], EMERGENCY DEPARTMENT     Mark Hdez MD  12/11/19 0119

## 2019-11-25 ENCOUNTER — TELEPHONE (OUTPATIENT)
Dept: OBGYN | Facility: CLINIC | Age: 29
End: 2019-11-25

## 2019-11-25 NOTE — TELEPHONE ENCOUNTER
----- Message from Arabella Garnica MD sent at 11/24/2019  3:38 AM CST -----  Regarding: Blood pressure check visit first trimester pregnancy patient  I saw this patient in the ED Sunday a.m.  She is chronic hypertensive.  I instructed her to call Monday a.m. to make a BP check on Monday or Tuesday of this week.  If she isn't scheduled by Monday at 0900 could you please give her a call.   Thanks,  Arabella

## 2019-11-26 ENCOUNTER — PRENATAL OFFICE VISIT (OUTPATIENT)
Dept: OBGYN | Facility: CLINIC | Age: 29
End: 2019-11-26
Payer: COMMERCIAL

## 2019-11-26 VITALS — WEIGHT: 209 LBS | OXYGEN SATURATION: 97 % | BODY MASS INDEX: 37.02 KG/M2 | HEART RATE: 134 BPM

## 2019-11-26 DIAGNOSIS — O10.919 CHRONIC HYPERTENSION AFFECTING PREGNANCY: Primary | ICD-10-CM

## 2019-11-26 PROCEDURE — 99207 ZZC PRENATAL VISIT: CPT | Performed by: OBSTETRICS & GYNECOLOGY

## 2019-11-26 NOTE — PROGRESS NOTES
Presents for ER follow-up for headache.   Headache resolves, BPs were elevated mostly in mild range at that time. Clinic visits show diastolic often 90 or slightly above, history of hypertension while on cOCPs.  Discussed chronic hypertension in pregnancy. Will do comprehensive fetal survey, growth ultrasounds, BPPs. Discussed delivery likely at 37-39 weeks depending on blood pressures.   Will start ASA at 12 weeks.   RTC 4 weeks.

## 2019-11-27 ENCOUNTER — PRE VISIT (OUTPATIENT)
Dept: MATERNAL FETAL MEDICINE | Facility: CLINIC | Age: 29
End: 2019-11-27

## 2019-12-03 ENCOUNTER — OFFICE VISIT (OUTPATIENT)
Dept: MATERNAL FETAL MEDICINE | Facility: CLINIC | Age: 29
End: 2019-12-03
Attending: OBSTETRICS & GYNECOLOGY
Payer: COMMERCIAL

## 2019-12-03 ENCOUNTER — HOSPITAL ENCOUNTER (OUTPATIENT)
Dept: ULTRASOUND IMAGING | Facility: CLINIC | Age: 29
Discharge: HOME | End: 2019-12-03
Attending: OBSTETRICS & GYNECOLOGY | Admitting: OBSTETRICS & GYNECOLOGY
Payer: COMMERCIAL

## 2019-12-03 DIAGNOSIS — Z36.9 FIRST TRIMESTER SCREENING: Primary | ICD-10-CM

## 2019-12-03 DIAGNOSIS — O26.90 PREGNANCY RELATED CONDITION, ANTEPARTUM: ICD-10-CM

## 2019-12-03 DIAGNOSIS — Z36.9 FIRST TRIMESTER SCREENING: ICD-10-CM

## 2019-12-03 PROCEDURE — 36415 COLL VENOUS BLD VENIPUNCTURE: CPT | Performed by: OBSTETRICS & GYNECOLOGY

## 2019-12-03 PROCEDURE — 84704 HCG FREE BETACHAIN TEST: CPT | Performed by: OBSTETRICS & GYNECOLOGY

## 2019-12-03 PROCEDURE — 82105 ALPHA-FETOPROTEIN SERUM: CPT | Performed by: OBSTETRICS & GYNECOLOGY

## 2019-12-03 PROCEDURE — 76813 OB US NUCHAL MEAS 1 GEST: CPT

## 2019-12-03 PROCEDURE — 96040 ZZH GENETIC COUNSELING, EACH 30 MINUTES: CPT | Mod: ZF | Performed by: GENETIC COUNSELOR, MS

## 2019-12-03 PROCEDURE — 84163 PAPPA SERUM: CPT | Performed by: OBSTETRICS & GYNECOLOGY

## 2019-12-03 NOTE — PROGRESS NOTES
[facility]  Genetic Counseling Consult    Patient: Liyah Rodriguez YOB: 1990   Date of Service: 19      Liyah Rodriguez was seen at [facility] for genetic consultation to discuss the options for routine screening for fetal chromosome abnormalities. She was accompanied to today's visit by her partner.      Impression/Plan:   1.  Liyah had an ultrasound and blood draw for first trimester screening. Nuchal translucency measurements could not be obtained today due to fetal position. She is scheduled to return to [facility] on  for a repeat NT attempt.  Results will likely be available that same day as blood was drawn today for screening.     2. Maternal serum AFP (single marker screen) is recommended after 15 weeks to screen for open neural tube defects. A quad screen should not be performed.    Pregnancy History:   /Parity:    Age at Delivery: 29 year old  MARGIE: 2020, by Ultrasound  Gestational Age: 12w3d    No significant complications or exposures were reported in the current pregnancy.    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 She reports taking all available precautions regarding potential exposures.    Liyah s pregnancy history is significant for:  o 2019: SAB at 9+5, D&C    Medical History:   Liyah s reported medical history is not expected to impact pregnancy management or risks to fetal development.       Family History:   A three-generation pedigree was obtained, and is scanned under the  Media  tab.   The following significant findings were reported by Liyah:    Liyah's partner's father has a diagnosis of hereditary hemochromatosis. Per his report, he was tested for this condition and was negative. We discussed that hemochromatosis is a common genetic disorder among Caucasians which causes iron overload. Hemochromatosis is an autosomal  recessive genetic condition. If Liyah's partner did indeed have negative testing for hemochromatosis, he could not pass this condition to his offspring. His results were not available for review today.    Otherwise, the reported family history is negative for multiple miscarriages, stillbirths, birth defects, mental retardation, known genetic conditions, and consanguinity.       Carrier Screening:   The patient reports that she and the father of the pregnancy have  ancestry:      Cystic fibrosis is an autosomal recessive genetic condition that occurs with increased frequency in individuals of  ancestry and carrier screening for this condition is available.  In addition,  screening in the [facility] includes cystic fibrosis.      Expanded carrier screening for mutations in a large panel of genes associated with autosomal recessive conditions including cystic fibrosis, spinal muscular atrophy, and others, is now available.      The patient has declined the carrier screening options reviewed today.       Risk Assessment for Chromosome Conditions:   We explained that the risk for fetal chromosome abnormalities increases with maternal age. We discussed specific features of common chromosome abnormalities, including Down syndrome, trisomy 13, trisomy 18, and sex chromosome trisomies.      - At age 29 at midtrimester, the risk to have a baby with Down syndrome is 1 in 760.     - At age 29 at midtrimester, the risk to have a baby with any chromosome abnormality is 1 in 380.     Testing Options:   We discussed the following options:   First trimester screening    First trimester ultrasound with nuchal translucency and nasal bone assessments, maternal plasma hCG, LEIGH-A, and AFP measurement    Screens for fetal trisomy 21, trisomy 13, and trisomy 18    Cannot screen for open neural tube defects; maternal serum AFP after 15 weeks is recommended     Non-invasive Prenatal Testing  (NIPT)    Maternal plasma cell-free DNA testing; first trimester ultrasound with nuchal translucency and nasal bone assessment is recommended, when appropriate    Screens for fetal trisomy 21, trisomy 13, trisomy 18, and sex chromosome aneuploidy    Cannot screen for open neural tube defects; maternal serum AFP after 15 weeks is recommended      We reviewed the benefits and limitations of this testing.  Screening tests provide a risk assessment specific to the pregnancy for certain fetal chromosome abnormalities, but cannot definitively diagnose or exclude a fetal chromosome abnormality.  Follow-up genetic counseling and consideration of diagnostic testing is recommended with any abnormal screening result.      It was a pleasure to be involved with Liyah s care. Face-to-face time of the meeting was 40 minutes.    Krupa Roland MS, [facility]  Maternal Fetal Medicine  [facility]  Ph: [phone]  [email or web address]

## 2019-12-03 NOTE — PROGRESS NOTES
"Please see \"Imaging\" tab under \"Chart Review\" for details of today's visit.    Malinda Live    "

## 2019-12-06 ENCOUNTER — HOSPITAL ENCOUNTER (OUTPATIENT)
Dept: ULTRASOUND IMAGING | Facility: CLINIC | Age: 29
Discharge: HOME | End: 2019-12-06
Attending: OBSTETRICS & GYNECOLOGY | Admitting: OBSTETRICS & GYNECOLOGY
Payer: COMMERCIAL

## 2019-12-06 ENCOUNTER — DOCUMENTATION ONLY (OUTPATIENT)
Dept: MATERNAL FETAL MEDICINE | Facility: CLINIC | Age: 29
End: 2019-12-06

## 2019-12-06 ENCOUNTER — OFFICE VISIT (OUTPATIENT)
Dept: MATERNAL FETAL MEDICINE | Facility: CLINIC | Age: 29
End: 2019-12-06
Attending: OBSTETRICS & GYNECOLOGY
Payer: COMMERCIAL

## 2019-12-06 DIAGNOSIS — Z36.82 ENCOUNTER FOR NUCHAL TRANSLUCENCY TESTING: Primary | ICD-10-CM

## 2019-12-06 DIAGNOSIS — Z36.9 FIRST TRIMESTER SCREENING: ICD-10-CM

## 2019-12-06 PROCEDURE — 76813 OB US NUCHAL MEAS 1 GEST: CPT

## 2019-12-06 NOTE — PROGRESS NOTES
December 6, 2019    I met with the patient following her NT follow up ultrasound today to review first trimester screening results. Liyah had the biochemical portion of first trimester screen drawn on 12/3/19, however an NT measurement was not able to be obtained due to fetal positioning at that time. Today we obtained an NT measurement and updated NTD laboratory to receive a result.    These results indicated a 1 in >10,000 risk for Down syndrome and a 1 in >10,000 risk for trisomy 18/13.     The NT measurement was 2.5 mm, the nasal bone was present, the LEIGH-A was 1.64 MoM and the free BhCG was 0.91 MoM.    This screening test gives information about the risk of some chromosome conditions in a pregnancy, but does not definitively diagnose or exclude the presence of these chromosome conditions.     We discussed that these results are very reassuring, but there remains a small chance for a false positive or false negative result. This screen also does not address all chromosome abnormalities or all causes of birth defects and cognitive delay. As such, Liyah will still have the option of the Innatal screen and/or diagnostic testing (CVS or amniocentesis) if she is interested or there is an indication later in the pregnancy.      All of Liyah s questions were answered to her satisfaction and I encouraged her to contact me if she has any questions in the future.    A copy of these results are available in her EPIC chart for her primary OB to review.    Maternal serum AFP only is recommended in the second trimester to screen for neural tube defects.    Bruna Gotti MS, [facility]  Genetic Counselor  Phone: [phone]  Pager: [phone]

## 2019-12-06 NOTE — PROGRESS NOTES
Please refer to ultrasound report under 'Imaging' Studies of 'Chart Review' tabs.    Ariel Ramos M.D.

## 2019-12-09 LAB — LAB SCANNED RESULT: NORMAL

## 2019-12-11 ASSESSMENT — ENCOUNTER SYMPTOMS
FATIGUE: 0
CONFUSION: 0
NECK STIFFNESS: 0
FEVER: 0
TREMORS: 0
LIGHT-HEADEDNESS: 0
NAUSEA: 0
ARTHRALGIAS: 0
SEIZURES: 0
NECK PAIN: 0
PALPITATIONS: 0
DIARRHEA: 0
FACIAL SWELLING: 0
BRUISES/BLEEDS EASILY: 0
DIZZINESS: 0
SORE THROAT: 0
SPEECH DIFFICULTY: 0
RHINORRHEA: 0
FACIAL ASYMMETRY: 0
MYALGIAS: 0
DIAPHORESIS: 0
WOUND: 0
SINUS PAIN: 0

## 2019-12-15 ENCOUNTER — NURSE TRIAGE (OUTPATIENT)
Dept: NURSING | Facility: CLINIC | Age: 29
End: 2019-12-15

## 2019-12-15 ENCOUNTER — TELEPHONE (OUTPATIENT)
Dept: OBGYN | Facility: CLINIC | Age: 29
End: 2019-12-15

## 2019-12-15 NOTE — TELEPHONE ENCOUNTER
"I connected the patient with scheduling, for an appointment.  I advised urgent care if they can't find an appointment.    Yudith Voss RN-[facility] Nurse Advisors      Reason for Disposition    Unusual vaginal discharge (e.g., bad smelling, yellow, green, or foamy-white)    Additional Information    Negative: Passed out (i.e., lost consciousness, collapsed and was not responding)    Negative: Shock suspected (e.g., cold/pale/clammy skin, too weak to stand, low BP, rapid pulse)    Negative: Difficult to awaken or acting confused (e.g., disoriented, slurred speech)    Negative: Sounds like a life-threatening emergency to the triager    Negative: Followed an abdomen (stomach) injury    Negative: [1] Abdominal pain AND [2] pregnant > 20 weeks    Negative: MODERATE-SEVERE abdominal pain (e.g., interferes with normal activities, awakens from sleep)     Pain at 3    Negative: [1] SEVERE vaginal bleeding (i.e., soaking 2 pads / hour, large blood clots) AND [2] present 2 or more hours    Negative: [1] MODERATE vaginal bleeding (i.e., soaking 1 pad / hour; clots) AND [2] present > 6 hours    Negative: [1] MODERATE vaginal bleeding (i.e., soaking 1 pad / hour; clots) AND [2] pregnant > 12 weeks    Negative: Passed tissue (e.g., gray-white)    Negative: [1] Vomiting AND [2] contains red blood or black (\"coffee ground\") material  (Exception: few red streaks in vomit that only happened once)    Negative: Shoulder pain    Negative: Lightheadedness or dizziness (e.g., feels like passing out)    Negative: Patient sounds very sick or weak to the triager    Negative: [1] Constant abdominal pain AND [2] present > 2 hours    Negative: Blood in urine (red, pink, or tea-colored)    Negative: Fever > 100.4 F (38.0 C)    Negative: White of the eyes have turned yellow (i.e., jaundice)    Negative: [1] Intermittent lower abdominal pain (e.g., cramping) AND [2] present > 24 hours    Negative: MILD vaginal bleeding (e.g., < 1 pad / hour) " "or SPOTTING    Negative: Discomfort when passing urine (e.g., pain, burning or stinging)    Negative: Prior history of \"ectopic pregnancy\" or previous tubal surgery (e.g., tubal ligation)    Negative: Has IUD    Protocols used: PREGNANCY - ABDOMINAL PAIN LESS THAN 20 WEEKS EGA-A-AH      "

## 2019-12-15 NOTE — TELEPHONE ENCOUNTER
Reason for call:  Same Day Appointment   Requested Provider: Yvonne Reese    PCP: [unfilled]    Reason for visit: intermittent cramping x 12 hours, worse than usual. Spoke with fna and was recommended to see a provider within 3 days    Duration of symptoms: 12 hours    Have you been treated for this in the past? No    Additional comments: patient is hoping for an appointment with Dr. Reese or another OB/GYN provider at [place] as soon as possible       Phone number to reach patient:  Home number on file [phone] (home)    Best Time:  Asap    Can we leave a detailed message on this number?  YES

## 2019-12-16 ENCOUNTER — NURSE TRIAGE (OUTPATIENT)
Dept: NURSING | Facility: CLINIC | Age: 29
End: 2019-12-16

## 2019-12-16 ENCOUNTER — HOSPITAL ENCOUNTER (EMERGENCY)
Facility: CLINIC | Age: 29
Discharge: HOME | End: 2019-12-16
Attending: EMERGENCY MEDICINE | Admitting: EMERGENCY MEDICINE
Payer: COMMERCIAL

## 2019-12-16 VITALS
OXYGEN SATURATION: 98 % | DIASTOLIC BLOOD PRESSURE: 73 MMHG | HEART RATE: 86 BPM | TEMPERATURE: 97.7 F | RESPIRATION RATE: 16 BRPM | SYSTOLIC BLOOD PRESSURE: 137 MMHG

## 2019-12-16 DIAGNOSIS — R19.7 VOMITING AND DIARRHEA: ICD-10-CM

## 2019-12-16 DIAGNOSIS — R11.10 VOMITING AND DIARRHEA: ICD-10-CM

## 2019-12-16 LAB
ANION GAP SERPL CALCULATED.3IONS-SCNC: 10 MMOL/L (ref 3–14)
BASOPHILS # BLD AUTO: 0 10E9/L (ref 0–0.2)
BASOPHILS NFR BLD AUTO: 0.2 %
BUN SERPL-MCNC: 12 MG/DL (ref 7–30)
CALCIUM SERPL-MCNC: 8.8 MG/DL (ref 8.5–10.1)
CHLORIDE SERPL-SCNC: 108 MMOL/L (ref 94–109)
CO2 SERPL-SCNC: 20 MMOL/L (ref 20–32)
CREAT SERPL-MCNC: 0.62 MG/DL (ref 0.52–1.04)
DIFFERENTIAL METHOD BLD: ABNORMAL
EOSINOPHIL # BLD AUTO: 0 10E9/L (ref 0–0.7)
EOSINOPHIL NFR BLD AUTO: 0.1 %
ERYTHROCYTE [DISTWIDTH] IN BLOOD BY AUTOMATED COUNT: 12.8 % (ref 10–15)
GFR SERPL CREATININE-BSD FRML MDRD: >90 ML/MIN/{1.73_M2}
GLUCOSE SERPL-MCNC: 106 MG/DL (ref 70–99)
HCT VFR BLD AUTO: 41 % (ref 35–47)
HGB BLD-MCNC: 13.8 G/DL (ref 11.7–15.7)
IMM GRANULOCYTES # BLD: 0.1 10E9/L (ref 0–0.4)
IMM GRANULOCYTES NFR BLD: 0.5 %
LYMPHOCYTES # BLD AUTO: 0.6 10E9/L (ref 0.8–5.3)
LYMPHOCYTES NFR BLD AUTO: 3.5 %
MCH RBC QN AUTO: 29.9 PG (ref 26.5–33)
MCHC RBC AUTO-ENTMCNC: 33.7 G/DL (ref 31.5–36.5)
MCV RBC AUTO: 89 FL (ref 78–100)
MONOCYTES # BLD AUTO: 0.6 10E9/L (ref 0–1.3)
MONOCYTES NFR BLD AUTO: 3.5 %
NEUTROPHILS # BLD AUTO: 15.6 10E9/L (ref 1.6–8.3)
NEUTROPHILS NFR BLD AUTO: 92.2 %
NRBC # BLD AUTO: 0 10*3/UL
NRBC BLD AUTO-RTO: 0 /100
PLATELET # BLD AUTO: 232 10E9/L (ref 150–450)
POTASSIUM SERPL-SCNC: 3.9 MMOL/L (ref 3.4–5.3)
RBC # BLD AUTO: 4.62 10E12/L (ref 3.8–5.2)
SODIUM SERPL-SCNC: 138 MMOL/L (ref 133–144)
WBC # BLD AUTO: 16.9 10E9/L (ref 4–11)

## 2019-12-16 PROCEDURE — 25000128 H RX IP 250 OP 636: Performed by: EMERGENCY MEDICINE

## 2019-12-16 PROCEDURE — 96375 TX/PRO/DX INJ NEW DRUG ADDON: CPT

## 2019-12-16 PROCEDURE — 25800030 ZZH RX IP 258 OP 636: Performed by: EMERGENCY MEDICINE

## 2019-12-16 PROCEDURE — 96361 HYDRATE IV INFUSION ADD-ON: CPT

## 2019-12-16 PROCEDURE — 99284 EMERGENCY DEPT VISIT MOD MDM: CPT | Mod: 25

## 2019-12-16 PROCEDURE — 85025 COMPLETE CBC W/AUTO DIFF WBC: CPT | Performed by: EMERGENCY MEDICINE

## 2019-12-16 PROCEDURE — 96374 THER/PROPH/DIAG INJ IV PUSH: CPT

## 2019-12-16 PROCEDURE — 80048 BASIC METABOLIC PNL TOTAL CA: CPT | Performed by: EMERGENCY MEDICINE

## 2019-12-16 RX ORDER — DIPHENHYDRAMINE HYDROCHLORIDE 50 MG/ML
25 INJECTION INTRAMUSCULAR; INTRAVENOUS ONCE
Status: COMPLETED | OUTPATIENT
Start: 2019-12-16 | End: 2019-12-16

## 2019-12-16 RX ORDER — ONDANSETRON 4 MG/1
4 TABLET, ORALLY DISINTEGRATING ORAL EVERY 6 HOURS PRN
Qty: 10 TABLET | Refills: 0 | Status: SHIPPED | OUTPATIENT
Start: 2019-12-16 | End: 2020-04-21

## 2019-12-16 RX ORDER — METOCLOPRAMIDE 5 MG/1
5 TABLET ORAL 4 TIMES DAILY PRN
Qty: 20 TABLET | Refills: 0 | Status: SHIPPED | OUTPATIENT
Start: 2019-12-16 | End: 2020-04-21

## 2019-12-16 RX ORDER — SODIUM CHLORIDE 9 MG/ML
1000 INJECTION, SOLUTION INTRAVENOUS CONTINUOUS
Status: DISCONTINUED | OUTPATIENT
Start: 2019-12-16 | End: 2019-12-16 | Stop reason: HOSPADM

## 2019-12-16 RX ORDER — DOXYLAMINE SUCCINATE AND PYRIDOXINE HYDROCHLORIDE, DELAYED RELEASE TABLETS 10 MG/10 MG 10; 10 MG/1; MG/1
1 TABLET, DELAYED RELEASE ORAL 2 TIMES DAILY
Qty: 20 TABLET | Refills: 0 | Status: SHIPPED | OUTPATIENT
Start: 2019-12-16 | End: 2020-04-21

## 2019-12-16 RX ORDER — METOCLOPRAMIDE HYDROCHLORIDE 5 MG/ML
10 INJECTION INTRAMUSCULAR; INTRAVENOUS ONCE
Status: COMPLETED | OUTPATIENT
Start: 2019-12-16 | End: 2019-12-16

## 2019-12-16 RX ADMIN — SODIUM CHLORIDE 1000 ML: 9 INJECTION, SOLUTION INTRAVENOUS at 09:03

## 2019-12-16 RX ADMIN — DIPHENHYDRAMINE HYDROCHLORIDE 25 MG: 50 INJECTION, SOLUTION INTRAMUSCULAR; INTRAVENOUS at 08:52

## 2019-12-16 RX ADMIN — SODIUM CHLORIDE 1000 ML: 9 INJECTION, SOLUTION INTRAVENOUS at 08:38

## 2019-12-16 RX ADMIN — METOCLOPRAMIDE 10 MG: 5 INJECTION, SOLUTION INTRAMUSCULAR; INTRAVENOUS at 08:52

## 2019-12-16 ASSESSMENT — ENCOUNTER SYMPTOMS
NAUSEA: 1
ABDOMINAL PAIN: 0
VOMITING: 1
DIARRHEA: 1
BLOOD IN STOOL: 0

## 2019-12-16 NOTE — ED AVS SNAPSHOT
[facility] Emergency Department  [address]  [facility] [address]  Phone:  [phone]  Fax:  [phone]                                    Liyah Rodriguez   MRN: [id number]    Department:  [facility] Emergency Department   Date of Visit:  12/16/2019           After Visit Summary Signature Page    I have received my discharge instructions, and my questions have been answered. I have discussed any challenges I see with this plan with the nurse or doctor.    ..........................................................................................................................................  Patient/Patient Representative Signature      ..........................................................................................................................................  Patient Representative Print Name and Relationship to Patient    ..................................................               ................................................  Date                                   Time    ..........................................................................................................................................  Reviewed by Signature/Title    ...................................................              ..............................................  Date                                               Time          22EPIC Rev 08/18

## 2019-12-16 NOTE — DISCHARGE INSTRUCTIONS
Continue oral hydration at home.  Try to use Reglan and/or Diclegis for nausea.  If persistent nausea consider Zofran.  Return with vaginal bleeding discussed with OB/GYN follow-up ultrasound as an outpatient.

## 2019-12-16 NOTE — ED TRIAGE NOTES
Presents to the ED with vomiting and diarrhea since last night. Unable to keep anything down. 14 weeks pregnant. Denies issues with morning sickness.

## 2019-12-16 NOTE — ED PROVIDER NOTES
"  History     Chief Complaint:  Nausea, Vomiting, & Diarrhea     The history is provided by the patient.      Liyah Rodriguez is a 29 year old 14 week pregnant female who presents to the emergency department today for evaluation of nausea, vomiting, and diarrhea. The patient states that since last night she has been nauseated, vomiting, and having diarrhea and she has been unable to keep anything down. 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 She has not had any vaginal bleeding and states she has had one normal ultrasound for her pregnant already. The patient adds that she did have some \"vaginal stretching/cramping\" pain two nights ago, she called her Ob line and was advised to rest; she took yesterday off of work and has not had any significant abdominal pain. She states this is her second pregnancy, but her first resulted in a miscarriage. She denies any recent travels or antibiotic use and states she has not had any blood in her diarrhea.     Allergies:  No Known Drug Allergies     Medications:    Unisom  Prenatal vitamin    Past Medical History:    Hypertension affecting pregnancy     Past Surgical History:    ACL repair  Dilation and curettage suction  Lateral meniscectomy, right   Hysterosalpingogram     Family History:    Hypertension   Cancer- lymphoma  Aneurysm, Father (63)    Social History:  The patient was accompanied to the ED by her .  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   Smoking Status: Never Smoker  Smokeless Tobacco: Never Used  Alcohol Use: Positive, occasional   Marital Status:      Review of Systems   Gastrointestinal: Positive for diarrhea, nausea and vomiting. Negative for abdominal pain and blood in stool.   Genitourinary: Negative for vaginal bleeding.   All other systems reviewed and are " negative.        Physical Exam     Patient Vitals for the past 24 hrs:   BP Temp Temp src Pulse Resp SpO2   12/16/19 0915 -- -- -- -- -- 99 %   12/16/19 0900 -- -- -- -- -- 99 %   12/16/19 0845 -- -- -- -- -- 96 %   12/16/19 0830 (!) 138/95 -- -- -- -- 97 %   12/16/19 0826 (!) 138/95 97.7  F (36.5  C) Oral 90 16 96 %      Physical Exam  Vitals signs reviewed.   Constitutional:       Appearance: She is obese.   HENT:      Head: Normocephalic.      Right Ear: Tympanic membrane normal.   Eyes:      General: No scleral icterus.  Pulmonary:      Effort: Pulmonary effort is normal.      Breath sounds: Normal breath sounds.   Abdominal:      General: Abdomen is flat.      Palpations: Abdomen is soft.   Skin:     Capillary Refill: Capillary refill takes less than 2 seconds.   Neurological:      General: No focal deficit present.      Mental Status: She is alert.   Psychiatric:         Mood and Affect: Mood normal.           Emergency Department Course     Laboratory:  Laboratory findings were communicated with the patient and family who voiced understanding of the findings.  CBC: WNL (WBC 16.9, HGB 13.8, )  BMP: Glucose 106 (H). O/w WNL (Creatinine 0.62)       Interventions:  0838 0.9% NaCl 1L IV   0852 Reglan 10 mg IV  0852 Diphenhydramine 25 mg IV  0903 0.9% NaCl 1L IV     Emergency Department Course:  0822 Nursing notes and vitals reviewed.  0839 I performed an exam of the patient as documented above.   0847 IV was inserted and blood was drawn for laboratory testing, results above.  0935 Patient was rechecked and updated with laboratory results.    Findings and plan explained to the Patient and spouse. Patient discharged home with instructions regarding supportive care, medications, and reasons to return. The importance of close follow-up was reviewed. The patient was prescribed Zofran, Reglan, and Doxylamine-Pyridoxine.      I personally reviewed the laboratory results with the Patient and spouse and answered all  related questions prior to discharge.      Impression & Plan      Medical Decision Making:  Patient presents with pregnancy and vomiting and diarrhea.  Clinical presentation is for gastroenteritis the patient is pregnant.  There is no concerns for ectopic as patient's had no vaginal bleeding and no significant abdominal pain.  IV hydration was performed lab tests are unremarkable patient tolerated fluids after antiemetics offered reassurance antinausea medication is safe in pregnancy and follow-up with OB/GYN.        Diagnosis:    ICD-10-CM    1. Vomiting and diarrhea R11.10     R19.7        Disposition:  The patient is discharged to home.     Discharge Medications:  New Prescriptions    DOXYLAMINE-PYRIDOXINE 10-10 MG TBEC    Take 1 tablet by mouth 2 times daily    METOCLOPRAMIDE (REGLAN) 5 MG TABLET    Take 1 tablet (5 mg) by mouth 4 times daily as needed (nausa)    ONDANSETRON (ZOFRAN ODT) 4 MG ODT TAB    Take 1 tablet (4 mg) by mouth every 6 hours as needed for nausea       Scribe Disclosure:  Ailyn BLACKWELL, am serving as a scribe at 8:33 AM on 12/16/2019 to document services personally performed by Efren Olsen MD based on my observations and the provider's statements to me.    12/16/2019   [facility] EMERGENCY DEPARTMENT       Efren Olsen MD  12/20/19 3558

## 2019-12-16 NOTE — TELEPHONE ENCOUNTER
Pt spoke with FNA today and was advised ED today.  Per note, pt was going to ED for eval.  Amanda Kamara RN

## 2019-12-16 NOTE — TELEPHONE ENCOUNTER
"30 y/o female who calls back about puking with diarrhea, she is 14w and 2 days, tejal than 6 emesis in the last 8 hours, can't stip vomiting, per protocols, RN advised esa to go to the ER for evaluation, may need IV hydration, she will go now.    Krupa Zarate RN - [facility] Nurse Advisor  12/16/2019  6:45AM    Reason for Disposition    [1] SEVERE vomiting (e.g., 6 or more times/day) AND [2] present > 8 hours    Additional Information    Negative: Shock suspected (e.g., cold/pale/clammy skin, too weak to stand, low BP, rapid pulse)    Negative: Difficult to awaken or acting confused (e.g., disoriented, slurred speech)    Negative: Sounds like a life-threatening emergency to the triager    Negative: [1] Vomiting AND [2] contains red blood or black (\"coffee ground\") material  (Exception: few red streaks in vomit that only happened once)    Negative: Severe pain in one eye    Negative: Recent head injury (within last 3 days)    Negative: Recent abdominal injury (within last 3 days)    Negative: [1] Insulin-dependent diabetes (Type I) AND [2] glucose > 400 mg/dl (22 mmol/l)    Protocols used: VOMITING-A-AH      "

## 2019-12-27 ENCOUNTER — PRENATAL OFFICE VISIT (OUTPATIENT)
Dept: OBGYN | Facility: CLINIC | Age: 29
End: 2019-12-27
Payer: COMMERCIAL

## 2019-12-27 VITALS
BODY MASS INDEX: 37.38 KG/M2 | WEIGHT: 211 LBS | SYSTOLIC BLOOD PRESSURE: 128 MMHG | DIASTOLIC BLOOD PRESSURE: 78 MMHG | HEART RATE: 102 BPM

## 2019-12-27 DIAGNOSIS — E66.812 CLASS 2 OBESITY DUE TO EXCESS CALORIES WITH BODY MASS INDEX (BMI) OF 37.0 TO 37.9 IN ADULT, UNSPECIFIED WHETHER SERIOUS COMORBIDITY PRESENT: ICD-10-CM

## 2019-12-27 DIAGNOSIS — Z34.92 PRENATAL CARE IN SECOND TRIMESTER: Primary | ICD-10-CM

## 2019-12-27 DIAGNOSIS — I10 CHRONIC HYPERTENSION: ICD-10-CM

## 2019-12-27 DIAGNOSIS — E66.09 CLASS 2 OBESITY DUE TO EXCESS CALORIES WITH BODY MASS INDEX (BMI) OF 37.0 TO 37.9 IN ADULT, UNSPECIFIED WHETHER SERIOUS COMORBIDITY PRESENT: ICD-10-CM

## 2019-12-27 PROCEDURE — 99000 SPECIMEN HANDLING OFFICE-LAB: CPT | Performed by: OBSTETRICS & GYNECOLOGY

## 2019-12-27 PROCEDURE — 99207 ZZC PRENATAL VISIT: CPT | Performed by: OBSTETRICS & GYNECOLOGY

## 2019-12-27 PROCEDURE — 82105 ALPHA-FETOPROTEIN SERUM: CPT | Mod: 90 | Performed by: OBSTETRICS & GYNECOLOGY

## 2019-12-27 PROCEDURE — 36415 COLL VENOUS BLD VENIPUNCTURE: CPT | Performed by: OBSTETRICS & GYNECOLOGY

## 2019-12-27 NOTE — PROGRESS NOTES
[facility]- OBGYN    CC: routine prenatal visit    S: Liyah Rodriguez is a 29 year old  at 15w6d by IUI dating who presents today for routine prenatal visit.  She denies any low abdominal cramping, vaginal bleeding, abnormal vaginal discharge, headache, changes in vision, chest pain, chest pressure, shortness of breath, nausea, vomiting, diarrhea, or constipation.  She states that after her GI illness about 2 weeks ago she has felt completely recovered.     Pregnancy notable for:  -history of SAB x1  -CHTN  -obesity BMI of 37    O:   Patient Vitals for the past 24 hrs:   BP Pulse Weight   19 1309 128/78 -- --   19 1259 (!) 143/88 102 95.7 kg (211 lb)     General- awake, alert, answering questions appropriately, appears comfortable  CV- regular rate  Lung- breathing comfortably on room air  Abd- soft, non-tender, non-distended  Ext- bilateral lower extremities without edema    A&P: Liyah Rodriguez is a 29 year old  at 15w6d by IUI dating who presents today for routine prenatal visit.     (Z34.92) Prenatal care in second trimester  (primary encounter diagnosis)  Comment: Patient currently doing well.  Normal NT on 19  Plan: Alpha fetoprotein maternal screen today.         Anatomy scan scheduled 2020, next OB visit scheduled 2020    (I10) Chronic hypertension  Comment: BP mild and then normal range today.    Plan: continue to monitor blood pressure closely     (E66.09,  Z68.37) Class 2 obesity due to excess calories with body mass index (BMI) of 37.0 to 37.9 in adult, unspecified whether serious comorbidity present  Comment: Patient with obesity.  Has not gained weight this pregnancy   Plan: continue to monitor weight gain    Arabella Garnica MD

## 2019-12-27 NOTE — PATIENT INSTRUCTIONS
Patient Education     Adapting to Pregnancy: Second Trimester    Keep up the healthy habits you started in your first trimester. You might be a little more tired than normal. So plan your day wisely. Look at the tips below and choose the ones that suit your lifestyle.  If you have any questions, check with your healthcare provider.  If you work  If you can, adjust your work with your employer to fit your needs. Try these tips:    If you stand for long periods, find ways to do some tasks while sitting. Also, try to stand with 1 foot resting on a low stool or ledge. Shift your weight from foot to foot often. Wear low-heeled shoes.    If you sit, keep your knees level with your hips. Rest your feet on a firm surface. Sit tall with support for your low back.    If you work long hours, ask about adjusting your schedule. Try taking shorter breaks more often.  When you travel  The second trimester may be the best time for any travel. Talk to your healthcare provider about any special plans you may need to make. Always:    Wear a seat belt. Fasten the lap part under your belly. Wear the shoulder part also.    Take breaks often during long trips by car or plane. Move around to stretch your legs.    Drink plenty of fluids on flights. The air in plane cabins is very dry.    Avoid hot climates or high altitudes if you are not used to them.    Avoid places where the food and water might make you sick.    Make sure you are up-to-date on all immunizations, including the flu vaccine. This is especially important when traveling overseas.  Taking time to relax  Find time to rest and relax at work or at home:    Take short time-outs daily. Do relaxation exercises.    Breathe deeply during stressful times.    Try not to take on too much. Plan tasks for times when you have the most energy.    Take naps when you can. Or just sit and relax.    After week 16, avoid lying on your back for more than a few minutes. Instead, lie on your  side. Switch sides often.  Continuing as lovers  Unless your healthcare provider tells you otherwise, there is no reason to stop having sex now. Blood supply increases to the pelvic area in the second trimester. Because of this, sex might be more enjoyable. Try different positions and see what s best. Also, talk to your partner about any changes in desire. Spotting may happen after sex. Be sure to let your healthcare provider know if there is heavy bleeding.  Keeping your environment safe  You can still clean house and use scented products. Just take some simple precautions:    Wear gloves when using cleaning fluids.    Open windows to let in fresh air. Use a fan if you paint.    Avoid secondhand smoke.    Don t breathe fumes from nail polish, hair spray, cleansers, or other chemicals.  Date Last Reviewed: 1/1/2018 2000-2018 [employer]. [address] [place] [address]. All rights reserved. This information is not intended as a substitute for professional medical care. Always follow your healthcare professional's instructions.           Patient Education     Pregnancy: Your Second Trimester Changes  Each day, you and your baby are changing and growing together. Here s a quick look at what s happening to both of you.  How you are changing  Even when you don t notice it, your body is adapting to meet the needs of your growing baby. The changes in your body might also affect your moods.  Your body  Your uterus expands as baby grows. As the weeks go by, you will feel more pressure on your bladder, stomach, and other organs. You may notice some skin color changes on your forehead, nose, or cheeks. Freckles may darken, and moles may grow. You may notice a darker line on your abdomen between your belly button and pubic bone in the midline.  Your moods  The second trimester is often easier than the first. Still, be prepared for mood swings. These are due to the increase in hormones (chemicals that  affect the way organs work) produced by your body. These mood swings are a normal part of pregnancy.  How your baby is growing          Month 4  Baby s heartbeat may be heard with a Doppler (hand-held ultrasound device) by 9 to 10 weeks. Eyebrows, eyelashes, and fingernails begin to form.  Month 5  You may feel your baby move. After a growth spurt, your baby nears 10 inches. Month 6  Baby s fingerprints have formed. Your baby weighs about 1 to 2 pounds and is about 12 inches long.   Date Last Reviewed: 1/1/2018 2000-2018 [employer]. [address] [place] [address], [place] [address]. All rights reserved. This information is not intended as a substitute for professional medical care. Always follow your healthcare professional's instructions.

## 2019-12-31 LAB
# FETUSES US: NORMAL
# FETUSES: NORMAL
AFP ADJ MOM AMN: 1.59
AFP SERPL-MCNC: 40 NG/ML
AGE - REPORTED: 30 YR
CURRENT SMOKER: NO
CURRENT SMOKER: NO
DIABETES STATUS PATIENT: NO
FAMILY MEMBER DISEASES HX: NO
FAMILY MEMBER DISEASES HX: NO
GA METHOD: NORMAL
GA METHOD: NORMAL
GA: NORMAL WK
IDDM PATIENT QL: NO
INTEGRATED SCN PATIENT-IMP: NORMAL
LMP START DATE: NORMAL
MONOCHORIONIC TWINS: NO
SERVICE CMNT-IMP: NO
SPECIMEN DRAWN SERPL: NORMAL
VALPROIC/CARBAMAZEPINE STATUS: NO
WEIGHT UNITS: NORMAL

## 2020-01-20 ENCOUNTER — PRENATAL OFFICE VISIT (OUTPATIENT)
Dept: OBGYN | Facility: CLINIC | Age: 30
End: 2020-01-20
Attending: OBSTETRICS & GYNECOLOGY
Payer: COMMERCIAL

## 2020-01-20 VITALS
DIASTOLIC BLOOD PRESSURE: 84 MMHG | OXYGEN SATURATION: 98 % | WEIGHT: 212 LBS | HEART RATE: 121 BPM | BODY MASS INDEX: 37.55 KG/M2 | SYSTOLIC BLOOD PRESSURE: 110 MMHG

## 2020-01-20 DIAGNOSIS — Z34.00 SUPERVISION OF NORMAL FIRST PREGNANCY, ANTEPARTUM: Primary | ICD-10-CM

## 2020-01-20 PROCEDURE — 99207 ZZC PRENATAL VISIT: CPT | Performed by: OBSTETRICS & GYNECOLOGY

## 2020-01-20 NOTE — PROGRESS NOTES
?FM now. Has ultrasound tomorrow. BP's have been 130-140/80-90's. Wt gain graph reviewed and doing GREAT! RTC 4 weeks and GCT then. BE

## 2020-01-21 ENCOUNTER — HOSPITAL ENCOUNTER (OUTPATIENT)
Dept: ULTRASOUND IMAGING | Facility: CLINIC | Age: 30
Discharge: HOME | End: 2020-01-21
Attending: OBSTETRICS & GYNECOLOGY | Admitting: OBSTETRICS & GYNECOLOGY
Payer: COMMERCIAL

## 2020-01-21 ENCOUNTER — OFFICE VISIT (OUTPATIENT)
Dept: MATERNAL FETAL MEDICINE | Facility: CLINIC | Age: 30
End: 2020-01-21
Attending: OBSTETRICS & GYNECOLOGY
Payer: COMMERCIAL

## 2020-01-21 DIAGNOSIS — O10.919 HTN IN PREGNANCY, CHRONIC: Primary | ICD-10-CM

## 2020-01-21 DIAGNOSIS — O26.90 PREGNANCY RELATED CONDITION, ANTEPARTUM: ICD-10-CM

## 2020-01-21 PROCEDURE — 76811 OB US DETAILED SNGL FETUS: CPT

## 2020-02-18 ENCOUNTER — OFFICE VISIT (OUTPATIENT)
Dept: MATERNAL FETAL MEDICINE | Facility: CLINIC | Age: 30
End: 2020-02-18
Attending: OBSTETRICS & GYNECOLOGY
Payer: COMMERCIAL

## 2020-02-18 ENCOUNTER — HOSPITAL ENCOUNTER (OUTPATIENT)
Dept: ULTRASOUND IMAGING | Facility: CLINIC | Age: 30
Discharge: HOME | End: 2020-02-18
Attending: OBSTETRICS & GYNECOLOGY | Admitting: OBSTETRICS & GYNECOLOGY
Payer: COMMERCIAL

## 2020-02-18 DIAGNOSIS — O10.919 HTN IN PREGNANCY, CHRONIC: Primary | ICD-10-CM

## 2020-02-18 DIAGNOSIS — O10.919 HTN IN PREGNANCY, CHRONIC: ICD-10-CM

## 2020-02-18 PROBLEM — Z34.00 SUPERVISION OF NORMAL FIRST PREGNANCY, ANTEPARTUM: Status: ACTIVE | Noted: 2019-11-14

## 2020-02-18 PROCEDURE — 76816 OB US FOLLOW-UP PER FETUS: CPT

## 2020-02-18 NOTE — PROGRESS NOTES
"Please see \"Imaging\" tab under \"Chart Review\" for details of today's US.    Marjorie Connolly, DO    "

## 2020-02-26 ENCOUNTER — PRENATAL OFFICE VISIT (OUTPATIENT)
Dept: OBGYN | Facility: CLINIC | Age: 30
End: 2020-02-26
Payer: COMMERCIAL

## 2020-02-26 VITALS
HEIGHT: 63 IN | SYSTOLIC BLOOD PRESSURE: 122 MMHG | DIASTOLIC BLOOD PRESSURE: 80 MMHG | BODY MASS INDEX: 38.62 KG/M2 | WEIGHT: 218 LBS

## 2020-02-26 DIAGNOSIS — R59.9 SWELLING OF LYMPH NODE: ICD-10-CM

## 2020-02-26 DIAGNOSIS — Z34.00 SUPERVISION OF NORMAL FIRST PREGNANCY, ANTEPARTUM: Primary | ICD-10-CM

## 2020-02-26 LAB
GLUCOSE 1H P 50 G GLC PO SERPL-MCNC: 96 MG/DL (ref 60–129)
HGB BLD-MCNC: 12.3 G/DL (ref 11.7–15.7)

## 2020-02-26 PROCEDURE — 82950 GLUCOSE TEST: CPT | Performed by: OBSTETRICS & GYNECOLOGY

## 2020-02-26 PROCEDURE — 99207 ZZC PRENATAL VISIT: CPT | Performed by: OBSTETRICS & GYNECOLOGY

## 2020-02-26 PROCEDURE — 36415 COLL VENOUS BLD VENIPUNCTURE: CPT | Performed by: OBSTETRICS & GYNECOLOGY

## 2020-02-26 PROCEDURE — 00000218 ZZHCL STATISTIC OBHBG - HEMOGLOBIN: Performed by: OBSTETRICS & GYNECOLOGY

## 2020-02-26 PROCEDURE — 86780 TREPONEMA PALLIDUM: CPT | Performed by: OBSTETRICS & GYNECOLOGY

## 2020-02-26 RX ORDER — ASPIRIN 81 MG/1
81 TABLET, CHEWABLE ORAL DAILY
Status: ON HOLD | COMMUNITY
End: 2020-06-04

## 2020-02-26 ASSESSMENT — MIFFLIN-ST. JEOR: SCORE: 1682.97

## 2020-02-26 ASSESSMENT — PATIENT HEALTH QUESTIONNAIRE - PHQ9: SUM OF ALL RESPONSES TO PHQ QUESTIONS 1-9: 2

## 2020-02-26 NOTE — PROGRESS NOTES
24w4d  Doing well since last visit.  Yesterday or the day before, she noticed a tender bump in her left armpit.  Had something like this a little over a month ago and it resolved on it's own.  Has had URI symptoms lately, but no breast lumps, known ingrown hairs, etc.  Exam consistent with swollen lymph node.  Breast exam on same side WNL.  Instructed patient to let us know if this doesn't resolve over the next 1-2 weeks.  Otherwise no concerns.  Some BH contractions, but only a handful a day.  Not painful.  No vaginal bleeding or leakage of fluid.  Baby active  GCT today  RTC 4w.  Mari Chavez MD

## 2020-02-27 LAB — T PALLIDUM AB SER QL: NONREACTIVE

## 2020-03-02 ENCOUNTER — HEALTH MAINTENANCE LETTER (OUTPATIENT)
Age: 30
End: 2020-03-02

## 2020-03-23 ENCOUNTER — PRENATAL OFFICE VISIT (OUTPATIENT)
Dept: OBGYN | Facility: CLINIC | Age: 30
End: 2020-03-23
Payer: COMMERCIAL

## 2020-03-23 DIAGNOSIS — I10 CHRONIC HYPERTENSION: ICD-10-CM

## 2020-03-23 DIAGNOSIS — Z34.93 PRENATAL CARE IN THIRD TRIMESTER: Primary | ICD-10-CM

## 2020-03-23 PROCEDURE — 99207 ZZC PRENATAL VISIT: CPT | Performed by: OBSTETRICS & GYNECOLOGY

## 2020-03-23 NOTE — PROGRESS NOTES
Telephone Encounter     Phone visit for modified prenatal care for COVID.    Start time:1605  Stop time:1615    [facility]- OBGYN    CC: phone prenatal visit.    S:Liyah Rodriguez is a 29 year old  at 28w3d by 7w2d us who presents today for phone routine prenatal visit.  Patient reports she has been having irregular mehdi zhao contractions.  Patient denies any vaginal bleeding, leakage of fluid.  She states she is feeling normal fetal movement.  She denies any headache, changes in vision, chest pain, chest pressure, shortness of breath, nausea, vomiting, diarrhea, or constipation.      Pregnancy notable for:  -chronic hypertension    A&P: Liyah Rodriguez is a 29 year old  at 28w3d by 7w2d us who presents today for phone routine prenatal visit.    (Z34.93) Prenatal care in third trimester  (primary encounter diagnosis)  Comment: Reviewed below COVID 19 precautions.  Discussed signs and symptoms of PTL, PPROM, fetal kick counts.  Recommended daily iron supplementation  Plan: In person visit in 2 weeks with BP check and Tdap injection.  Given patient's CHTN, she is not an ideal candidate for the modified prenatal schedule.      (I10) Chronic hypertension  Comment: Patient reports normal blood pressures at home.   Plan: Monitor BP closely at each prenatal visit. Continue daily ASA  Patient with MFM ultrasound scheduled for 3/24/2020    Arabella Garnica MD    The best way to prevent an infection is to avoid being exposed to the virus. We recommend that you wash your hands frequently and avoid touching your eyes, nose or mouth.  Practice social distancing by staying home as much as possible, avoiding gatherings and minimize trips for essentials. You should especially avoid any contact with people who are sick. It is possible that people who have the virus have little or no symptoms which is why social distancing is so important to avoid spreading the virus. Clean  frequently touched surfaces daily. If you do start to have symptoms such as cough, fever or mild shortness of breath, you should stay home for at least 14 days.  If your symptoms are worrisome or severe or you are scheduled during this time to have a clinic visit you should call us at [phone].    Due to anticipated shortage of blood products we recommend all pregnant women take an iron supplement (ferrous gluconate or ferrous sulfate) in addition to a prenatal vitamin.     The hospital has strict visitor restrictions at this time for your safety. You are allowed to have one healthy adult visitor during your hospital stay and it must be the same person the entire time.     For information about Covid-19 and pregnancy we look to the center for disease control, the CDC. You can look at this information online at:   https://www.cdc.gov/coronavirus/2019-ncov/hcp/pregnant-women-faq.html

## 2020-03-24 ENCOUNTER — HOSPITAL ENCOUNTER (OUTPATIENT)
Dept: ULTRASOUND IMAGING | Facility: CLINIC | Age: 30
End: 2020-03-24
Attending: OBSTETRICS & GYNECOLOGY
Payer: COMMERCIAL

## 2020-03-24 ENCOUNTER — OFFICE VISIT (OUTPATIENT)
Dept: MATERNAL FETAL MEDICINE | Facility: CLINIC | Age: 30
End: 2020-03-24
Attending: OBSTETRICS & GYNECOLOGY
Payer: COMMERCIAL

## 2020-03-24 VITALS — SYSTOLIC BLOOD PRESSURE: 124 MMHG | DIASTOLIC BLOOD PRESSURE: 84 MMHG

## 2020-03-24 DIAGNOSIS — O10.919 HTN IN PREGNANCY, CHRONIC: ICD-10-CM

## 2020-03-24 DIAGNOSIS — O10.919 HTN IN PREGNANCY, CHRONIC: Primary | ICD-10-CM

## 2020-03-24 PROCEDURE — 76816 OB US FOLLOW-UP PER FETUS: CPT

## 2020-03-24 NOTE — PATIENT INSTRUCTIONS
The best way to prevent an infection is to avoid being exposed to the virus. We recommend that you wash your hands frequently and avoid touching your eyes, nose or mouth.  Practice social distancing by staying home as much as possible, avoiding gatherings and minimize trips for essentials. You should especially avoid any contact with people who are sick. It is possible that people who have the virus have little or no symptoms which is why social distancing is so important to avoid spreading the virus. Clean frequently touched surfaces daily. If you do start to have symptoms such as cough, fever or mild shortness of breath, you should stay home for at least 14 days.  If your symptoms are worrisome or severe or you are scheduled during this time to have a clinic visit you should call us at [phone].    Due to anticipated shortage of blood products we recommend all pregnant women take an iron supplement (ferrous gluconate or ferrous sulfate 325mg daily) in addition to a prenatal vitamin.     The hospital has strict visitor restrictions at this time for your safety. You are allowed to have one healthy adult visitor during your hospital stay and it must be the same person the entire time.     For information about Covid-19 and pregnancy we look to the center for disease control, the CDC. You can look at this information online at:   https://www.cdc.gov/coronavirus/2019-ncov/hcp/pregnant-women-faq.html  Patient Education     Adapting to Pregnancy: Third Trimester    Although common during pregnancy, some discomforts may seem worse in the final weeks. Simple lifestyle changes can help. Take care of yourself. And ask your partner to help out with small tasks.  Limiting leg problems  Ways to combat leg issues:    Wear support hose all day.    Avoid snug shoes and clothes that bind, like tight pants and socks with elastic tops.    Sit with your feet and legs raised often.  Caring for your breasts  Tips to follow  include:    Wash with plain water. Avoid using harsh soaps or rubbing alcohol. They may cause dryness.    Wear a nursing bra for extra support. It can also hide any leaks from your nipples.  Controlling hemorrhoids  Ways to avoid hemorrhoids include:    Eat foods that are high in fiber. Also, exercise and drink enough fluids. This will reduce constipation and hemorrhoids.    Sleep and nap on your side. This limits pressure on the veins of your rectum.    Try not to stand or sit for long periods.  Controlling back pain  As your body changes during pregnancy, your back must work in new ways. Back pain is due to many causes. Physical changes in your body can strain your back and its supporting muscles. Also, hormones (chemicals that carry messages throughout the body) increase during pregnancy. This can affect how your muscles and joints work together. All of these changes can lead to pain. Pain may be felt in the upper or lower back. Pain is also common in the pelvis. Some pregnant women have sciatica. This is pain caused by pressure on the sciatic nerve running down the back of the leg. Ask your healthcare provider for specific tips and exercises to help control your back pain.  Tips to help you rest  Good rest and sleep will help you feel better. Here are some ideas:    Ask your partner to massage your shoulders, neck, or back.    Limit the errands you do each day.    Lie down in the afternoon or after work for a few minutes.    Take a warm bath before you go to sleep.    Drink warm milk or teas without caffeine.    Avoid coffee, black tea, and cola.  Stopping heartburn    Avoid spicy, greasy, fried, or acidic foods.    Eat small amounts more often. Eat slowly.   Wait 2 hours after eating before lying down.    Sleep with your upper body raised 6 inches.   Managing mood swings  Ways to manage mood swings include:    Know that mood changes are normal.    Exercise often, but get plenty of rest.    Address any concerns  and limit stress. Talking to your partner, other women, or your healthcare provider may help.  Dealing with urinary frequency  Tips to deal with having to urinate often include:    Drink plenty of water all day. If you drink a lot in the evening, though, you may have to get up more in the night.    Limit coffee, black tea, and cola.  Date Last Reviewed: 2/1/2018 2000-2019 [employer]. [address] [place] [address], [place] [address]. All rights reserved. This information is not intended as a substitute for professional medical care. Always follow your healthcare professional's instructions.           Patient Education     Comfort Tips During Pregnancy    Talk with your healthcare provider before using pain-relieving medicine at any time during your pregnancy.  First trimester tips  Nausea    Get up slowly. Eat a few unsalted crackers before you get out of bed.    Avoid smells that bother you.    Eat small bland low fat, light high-protein meals at frequent intervals.    Sip on water, weak tea, or clear soft drinks, like ginger ale. Eat ice chips.  Fatigue    Take catnaps when you can.    Get regular exercise.    Accept help from others.    Practice good sleep habits, like going to bed and getting up at the same time each day. Use your bed only for sleep and sex.  Mood swings    Talk about your feelings with others, including other mothers.    Limit sugar, chocolate, and caffeine.    Eat a healthy diet. Don t skip meals.    Get regular exercise.  Headaches    Get fresh air and exercise.    Relax and get enough rest.    Check with your healthcare provider before taking any pain medicines.  Second trimester tips  Here are some suggestions to help you cope:    To limit ankle swelling, sit with your feet raised or wear support hose.    If you have pain in your groin and stomach (round ligament pain), avoid sudden twisting movements.    For leg cramps, flexing your foot often brings immediate relief. You also  may try massaging your calf in long, downward strokes, or stretching your legs before going to bed. Get enough exercise and wear shoes with flexible soles.  Third trimester tips  Reducing heartburn    Eat small, light meals throughout the day rather than 3 large ones.    Sleep with your upper body raised 6 inches. Don t lie down until 2 hours after you eat.    Don't eat greasy, fried, or spicy foods.    Avoid citrus fruits and juices.  Treating constipation    Eat foods high in fiber (whole-grain foods, fresh fruit and vegetables).    Drink plenty of water.    Get regular exercise.    Discuss other medicines (like docusate and psyllium) with your healthcare provider.  Taking care of your breasts    Avoid using harsh soaps or alcohol, which can cause excessive dryness.    Wear nursing bras. They provide more support than regular bras and can be used after pregnancy if you breastfeed.  Getting a good night s sleep    Take a warm shower before bed.    Sleep on a firm mattress.    Lie on your side with 1 leg crossed over the other.    Use pillows to support arms, legs, and belly.  Date Last Reviewed: 10/1/2017    1112-5087 [employer]. [address] [place] [address]. All rights reserved. This information is not intended as a substitute for professional medical care. Always follow your healthcare professional's instructions.           Patient Education     Pregnancy: Your Third Trimester Changes  As the baby grows, your body changes too. You may also see signs that your body is getting ready for labor. Be patient. Within a few more weeks, your baby will be born.  How you are changing  Your body is preparing for the birth of your baby. Some of the most common changes are listed below. If you have any questions or concerns, ask your healthcare provider:    You ll gain more weight from fluids, extra blood, and fat deposits.    Your breasts will grow as your body gets ready to feed the baby. They may  be more tender. You may also notice a slight yellow or white discharge from the nipples.    Discharge from your vagina may increase. This is normal.    You might see some skin color changes on your forehead, cheeks, or nose. Most of these will go away after you deliver.  How your baby is growing       Month 7  Your baby can open and close his or her eyes and weighs around 4 pounds. If born prematurely (too early), your baby would likely survive with special care. Month 8  Your baby is building up body fat and weighs around 6 pounds. Month 9  Your baby weighs nearly 7 pounds and is about 19 to 21 inches long. In other words, any day now...   Date Last Reviewed: 2/1/2018 2000-2019 [employer] [address] [place] [address], [place] [address]. All rights reserved. This information is not intended as a substitute for professional medical care. Always follow your healthcare professional's instructions.           Patient Education     Kick Counts    It s normal to worry about your baby s health. One way you can know your baby s doing well is to record the baby s movements once a day. This is called a kick count. Remember to take your kick count records to all your appointments with your healthcare provider.  How to count kicks  Time how long it takes you to feel 10 kicks, flutters, swishes, or rolls. Ideally, you want to feel at least 10 movements within 2 hours. You will likely feel 10 movements in less time than that.  Starting at 28 weeks, count your baby's movements daily. Follow your healthcare provider's instructions for kick counting. Here are tips for counting kicks:    Choose a time when the baby is active, such as after a meal.     Sit comfortably or lie on your side.     The first time the baby moves, write down the time.     Count each movement until the baby has moved 10 times. This can take from 20 minutes to 2 hours.     If you have not felt 10 kicks by the end of the second hour, wait a few  hours. Then try again.    Try to do it at the same time each day.  When to call your healthcare provider  Call your healthcare provider right away if:    You do a couple sets of kick counts during the day and your baby moves fewer than 10 times in 2 hours    Your baby moves much less often than on the days before.    You have not felt your baby move all day.  Date Last Reviewed: 12/1/2017 2000-2019 [employer]. [address] [place] [address], [place] [address]. All rights reserved. This information is not intended as a substitute for professional medical care. Always follow your healthcare professional's instructions.

## 2020-03-24 NOTE — PROGRESS NOTES
"Please see \"Imaging\" tab under Chart Review for full details.    Ana Hess MD  Maternal Fetal Medicine    "

## 2020-04-08 ENCOUNTER — PRENATAL OFFICE VISIT (OUTPATIENT)
Dept: OBGYN | Facility: CLINIC | Age: 30
End: 2020-04-08
Payer: COMMERCIAL

## 2020-04-08 ENCOUNTER — TELEPHONE (OUTPATIENT)
Dept: OBGYN | Facility: CLINIC | Age: 30
End: 2020-04-08

## 2020-04-08 VITALS
BODY MASS INDEX: 40.03 KG/M2 | SYSTOLIC BLOOD PRESSURE: 134 MMHG | HEART RATE: 86 BPM | TEMPERATURE: 97 F | DIASTOLIC BLOOD PRESSURE: 88 MMHG | WEIGHT: 226 LBS

## 2020-04-08 DIAGNOSIS — O10.919 CHRONIC HYPERTENSION AFFECTING PREGNANCY: ICD-10-CM

## 2020-04-08 DIAGNOSIS — Z34.00 SUPERVISION OF NORMAL FIRST PREGNANCY, ANTEPARTUM: Primary | ICD-10-CM

## 2020-04-08 PROCEDURE — 99207 ZZC PRENATAL VISIT: CPT | Performed by: OBSTETRICS & GYNECOLOGY

## 2020-04-08 PROCEDURE — 90715 TDAP VACCINE 7 YRS/> IM: CPT | Performed by: OBSTETRICS & GYNECOLOGY

## 2020-04-08 PROCEDURE — 90471 IMMUNIZATION ADMIN: CPT | Performed by: OBSTETRICS & GYNECOLOGY

## 2020-04-08 NOTE — PATIENT INSTRUCTIONS
The best way to prevent an infection is to avoid being exposed to the virus. We recommend that you wash your hands frequently and avoid touching your eyes, nose or mouth.  Practice social distancing by staying home as much as possible, avoiding gatherings and minimize trips for essentials. You should especially avoid any contact with people who are sick. It is possible that people who have the virus have little or no symptoms which is why social distancing is so important to avoid spreading the virus. Clean frequently touched surfaces daily. If you do start to have symptoms such as cough, fever or mild shortness of breath, you should stay home for at least 14 days.  If your symptoms are worrisome or severe or you are scheduled during this time to have a clinic visit you should call us at [phone].    Due to anticipated shortage of blood products we recommend all pregnant women take an iron supplement (ferrous gluconate or ferrous sulfate) in addition to a prenatal vitamin.     The hospital has strict visitor restrictions at this time for your safety. Please be aware that visitor restrictions are subject to change. You are allowed to have one healthy adult visitor during your hospital stay, it must be the same person the entire time and they must stay with you at the hospital the entire time (they are not allowed to come and go).     For information about Covid-19 and pregnancy we look to the center for disease control, the CDC. You can look at this information online at:   https://www.cdc.gov/coronavirus/2019-ncov/hcp/pregnant-women-faq.html

## 2020-04-08 NOTE — PROGRESS NOTES
30w4d   Episodes of tachycardia - 130s at rest. No chest pain or SOB. Has not yet been able to get Holter monitor.  Taking BP at home - typically 130s-140s/80s. No headaches. No visual changes. No swelling. Reviewed s/sx pre-eclampsia.   Active fetal movement. Occasional Mitchell Frausto contractions.   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   Has chronic htn, discussed IOL 38-39 weeks if BPs remain mild range and no s/sx pre-eclampsia.   Plan for next visit 32 weeks phone and then 34 and 36 weeks in person due to chronic htn.  TdaP given today.  Mari Garcia MD

## 2020-04-08 NOTE — TELEPHONE ENCOUNTER
Can you call Cardiology to help Liyah get a Holter monitor. Apparently they are giving her a run around, want to delay it for months but she is 30 weeks and should get it soon. Thanks. Mari Garcia MD     Call to [facility]. Left message through answering system to have Cardiology Clinic call us back due to long hold times.   Sheree Buck RN

## 2020-04-08 NOTE — Clinical Note
Can you call Cardiology to help Liyah get a Holter monitor. Apparently they are giving her a run around, want to delay it for months but she is 30 weeks and should get it soon. Thanks. Mari Garcia MD

## 2020-04-09 ENCOUNTER — TELEPHONE (OUTPATIENT)
Dept: OBGYN | Facility: CLINIC | Age: 30
End: 2020-04-09

## 2020-04-09 DIAGNOSIS — Z34.00 SUPERVISION OF NORMAL FIRST PREGNANCY, ANTEPARTUM: ICD-10-CM

## 2020-04-09 DIAGNOSIS — R00.0 TACHYCARDIA: Primary | ICD-10-CM

## 2020-04-09 NOTE — TELEPHONE ENCOUNTER
Reason for call:  Other   Patient called regarding (reason for call): call back  Additional comments: pt calling because her order for cardiac services was placed incorrectly and needs it resubmitted    Phone number to reach patient:  Home number on file [phone] (home)    Best Time:  n/a    Can we leave a detailed message on this number?  YES    Travel screening: Not Applicable

## 2020-04-09 NOTE — TELEPHONE ENCOUNTER
Call to patient. She stated that [facility] can get her in in soon, but they need the order changed to schedule off of. Call to [facility] to check on order at [phone]. Went to voicemail and left a message to have Cardiology clinic call back to confirm correct order for patient.  Sheree Buck RN

## 2020-04-10 NOTE — TELEPHONE ENCOUNTER
Nurse from cardiology called back in regards to order. Incorrect order was placed. She gave me the correct one so I ordered it.   Danae Mota, RN-BSN

## 2020-04-15 ENCOUNTER — NURSE TRIAGE (OUTPATIENT)
Dept: NURSING | Facility: CLINIC | Age: 30
End: 2020-04-15

## 2020-04-15 ENCOUNTER — HOSPITAL ENCOUNTER (OUTPATIENT)
Dept: CARDIOLOGY | Facility: CLINIC | Age: 30
Discharge: HOME | End: 2020-04-15
Attending: OBSTETRICS & GYNECOLOGY | Admitting: OBSTETRICS & GYNECOLOGY
Payer: COMMERCIAL

## 2020-04-15 DIAGNOSIS — R00.0 TACHYCARDIA: ICD-10-CM

## 2020-04-15 DIAGNOSIS — Z34.00 SUPERVISION OF NORMAL FIRST PREGNANCY, ANTEPARTUM: ICD-10-CM

## 2020-04-15 PROCEDURE — 93227 XTRNL ECG REC<48 HR R&I: CPT | Performed by: INTERNAL MEDICINE

## 2020-04-15 PROCEDURE — 93225 XTRNL ECG REC<48 HRS REC: CPT

## 2020-04-16 ENCOUNTER — TELEPHONE (OUTPATIENT)
Dept: OBGYN | Facility: CLINIC | Age: 30
End: 2020-04-16

## 2020-04-16 ENCOUNTER — HOSPITAL ENCOUNTER (OUTPATIENT)
Facility: CLINIC | Age: 30
Discharge: HOME | End: 2020-04-16
Attending: OBSTETRICS & GYNECOLOGY | Admitting: OBSTETRICS & GYNECOLOGY
Payer: COMMERCIAL

## 2020-04-16 ENCOUNTER — HOSPITAL ENCOUNTER (OUTPATIENT)
Facility: CLINIC | Age: 30
End: 2020-04-16
Attending: OBSTETRICS & GYNECOLOGY | Admitting: OBSTETRICS & GYNECOLOGY
Payer: COMMERCIAL

## 2020-04-16 VITALS
SYSTOLIC BLOOD PRESSURE: 142 MMHG | HEIGHT: 63 IN | DIASTOLIC BLOOD PRESSURE: 85 MMHG | BODY MASS INDEX: 39.51 KG/M2 | RESPIRATION RATE: 16 BRPM | TEMPERATURE: 98.5 F | WEIGHT: 223 LBS

## 2020-04-16 PROBLEM — Z36.89 ENCOUNTER FOR TRIAGE IN PREGNANT PATIENT: Status: ACTIVE | Noted: 2020-04-16

## 2020-04-16 LAB
ALT SERPL W P-5'-P-CCNC: 27 U/L (ref 0–50)
AST SERPL W P-5'-P-CCNC: 16 U/L (ref 0–45)
CREAT SERPL-MCNC: 0.58 MG/DL (ref 0.52–1.04)
CREAT UR-MCNC: 208 MG/DL
GFR SERPL CREATININE-BSD FRML MDRD: >90 ML/MIN/{1.73_M2}
HGB BLD-MCNC: 11.3 G/DL (ref 11.7–15.7)
PLATELET # BLD AUTO: 208 10E9/L (ref 150–450)
PROT UR-MCNC: 0.34 G/L
PROT/CREAT 24H UR: 0.16 G/G CR (ref 0–0.2)

## 2020-04-16 PROCEDURE — 82565 ASSAY OF CREATININE: CPT | Performed by: OBSTETRICS & GYNECOLOGY

## 2020-04-16 PROCEDURE — 84460 ALANINE AMINO (ALT) (SGPT): CPT | Performed by: OBSTETRICS & GYNECOLOGY

## 2020-04-16 PROCEDURE — 59025 FETAL NON-STRESS TEST: CPT | Mod: 26 | Performed by: OBSTETRICS & GYNECOLOGY

## 2020-04-16 PROCEDURE — 36415 COLL VENOUS BLD VENIPUNCTURE: CPT | Performed by: OBSTETRICS & GYNECOLOGY

## 2020-04-16 PROCEDURE — 84156 ASSAY OF PROTEIN URINE: CPT | Performed by: OBSTETRICS & GYNECOLOGY

## 2020-04-16 PROCEDURE — G0463 HOSPITAL OUTPT CLINIC VISIT: HCPCS

## 2020-04-16 PROCEDURE — 99213 OFFICE O/P EST LOW 20 MIN: CPT | Mod: 25 | Performed by: OBSTETRICS & GYNECOLOGY

## 2020-04-16 PROCEDURE — 84450 TRANSFERASE (AST) (SGOT): CPT | Performed by: OBSTETRICS & GYNECOLOGY

## 2020-04-16 PROCEDURE — 85049 AUTOMATED PLATELET COUNT: CPT | Performed by: OBSTETRICS & GYNECOLOGY

## 2020-04-16 PROCEDURE — 85018 HEMOGLOBIN: CPT | Performed by: OBSTETRICS & GYNECOLOGY

## 2020-04-16 RX ORDER — FERROUS SULFATE 325(65) MG
325 TABLET ORAL
COMMUNITY
End: 2022-02-04

## 2020-04-16 ASSESSMENT — MIFFLIN-ST. JEOR: SCORE: 1705.65

## 2020-04-16 NOTE — PLAN OF CARE
Data: [facility] at 0050.   Reason for maternal/fetal assessment per patient is Hypertension  . Patient is a . Prenatal record reviewed.      OB History    Para Term  AB Living   2 0 0 0 1 0   SAB TAB Ectopic Multiple Live Births   1 0 0 0 0      # Outcome Date GA Lbr Ian/2nd Weight Sex Delivery Anes PTL Lv   2 Current            1 SAB 19 9w5d    AB, COMPLETE         Birth Comments: D&C      Medical History:   Past Medical History:   Diagnosis Date     NO ACTIVE PROBLEMS    . Gestational Age 31w5d. VSS. Cervix: not examined.  Fetal movement present. Patient denies cramping, backache, vaginal discharge, pelvic pressure, UTI symptoms, GI problems, bloody show, vaginal bleeding, edema, headache, visual disturbances, epigastric or URQ pain, abdominal pain, rupture of membranes.Pt reports elevated bp @ home.  Support persons are present.  Action: Verbal consent for EFM. Triage assessment completed. EFM applied for fetal/uterine assessment. Fetal assessment: Presumed adequate fetal oxygenation documented (see flow record).. Patient instructed to report change in fetal movement, vaginal leaking of fluid or bleeding, abdominal pain, or any concerns related to the pregnancy to her nurse/physician.   Response: Dr. Garnica informed of 0120. Plan per provider is discharge to home. Patient verbalized understanding of education and verbalized agreement with plan. Discharged ambulatory at 0343.

## 2020-04-16 NOTE — TELEPHONE ENCOUNTER
Paged by FNA regarding patient concern.     Patient reports she was sitting at home wearing her holter monitor and felt like heart was beating hard, so she took her blood pressure.      Her initial BP was 158/90 and the repeat was 160/92.  Typically her BP is 130's/80's.  She has chronic hypertension and is not currently on any medications.    She denies any HA/ vision changes/ chest pain/ shortness of breath.    Recommend coming to L&D for blood pressure monitoring and rule out pre-eclampsia.  Patient is in agreement with plan.    Arabella Garnica MD

## 2020-04-16 NOTE — DISCHARGE INSTRUCTIONS
Discharge Instruction for Undelivered Patients      You were seen for: BP assessment  We Consulted: Dr. Garnica  You had (Test or Medicine):labs     Diet:   Drink 8 to 12 glasses of liquids (milk, juice, water) every day.  You may eat meals and snacks.     Activity:  Call your doctor or nurse midwife if your baby is moving less than usual.     Call your provider if you notice:  Swelling in your face or increased swelling in your hands or legs.  Headaches that are not relieved by Tylenol (acetaminophen).  Changes in your vision (blurring: seeing spots or stars.)  Nausea (sick to your stomach) and vomiting (throwing up).   Weight gain of 5 pounds or more per week.  Heartburn that doesn't go away.  Signs of bladder infection: pain when you urinate (use the toilet), need to go more often and more urgently.  The bag of prasad (rupture of membranes) breaks, or you notice leaking in your underwear.  Bright red blood in your underwear.  Abdominal (lower belly) or stomach pain.  *If less than 34 weeks: Contractions (tightenings) more than 6 times in one hour.  Increase or change in vaginal discharge (note the color and amount)      Follow-up:  As scheduled in the clinic

## 2020-04-16 NOTE — TELEPHONE ENCOUNTER
----- Message from Arabella Garnica MD sent at 4/16/2020  3:36 AM CDT -----  Regarding: BP check in clinic  Could you please schedule patient for an in person BP check in clinic for early next week?  She's going to bring her home BP cuff to make sure it is accurate at that time as well.  Thanks,  Arabella

## 2020-04-16 NOTE — PROGRESS NOTES
"Obstetrics Triage Note    HPI:  Liyah Rodriguez is a 29 year old  at 31w5d, pregnancy complicated by chronic HTN, presenting to triage with a chief complaint of elevated blood pressures at home.      Patient states that she began having a sensation of \"bounding\" pulses today. She checked her blood pressure and it was elevated 150s-low 160s.  Her baseline systolic is 130s. She states that she has otherwise been feeling well. She denies headaches, vision changes, chest pain, SOB, abdominal pain.  She has had tachycardia this pregnancy and has a Holter monitor on.    ROS:  Negative except as mentioned in HPI.    PMH:  Past Medical History:   Diagnosis Date     NO ACTIVE PROBLEMS        PSH:  Past Surgical History:   Procedure Laterality Date     ACL repair  2008     CARDIAC SURGERY      TACHYCARDIA, WEARING HALTER MONITOR     DILATION AND CURETTAGE SUCTION N/A 2019    Procedure: Suction Dilation and Curettage   (9 Weeks and 5 Days);  Surgeon: Coni Barnett MD;  Location: [facility]     ORTHOPEDIC SURGERY Right     knee surgery     Right lateral menisectomy  2008     XR HYSTEROSALPINGOGRAM  2019    normal fill and spill       Medications:  No current facility-administered medications for this encounter.        Allergies:     Allergies   Allergen Reactions     Nkda [No Known Drug Allergies]        Physical Exam:   Vitals:    20 0121 20 0137 20 0200 20 0257   BP:  (!) 137/92 130/73 (!) 142/85   Resp:     Temp:       TempSrc:       Weight: 101.2 kg (223 lb)      Height: 1.6 m (5' 3\")         Gen:  resting comfortably, in NAD  CV:  RRR, no m/r/g  Pulm:  CTAB, no increased work of breathing  Abd:  soft, gravid, non-tender, non-distended  Ext: trace edems  Neuro: 2+ patellar and biceps reflxes    NST:  FHT: , moderate cecily, + accels, no decels  [place]: no contractions    Labs:   Results for orders placed or performed during the hospital encounter of " 20 (from the past 24 hour(s))   Protein  random urine with Creat Ratio   Result Value Ref Range    Protein Random Urine 0.34 g/L    Protein Total Urine g/gr Creatinine 0.16 0 - 0.2 g/g Cr   Creatinine urine calculation only   Result Value Ref Range    Creatinine Urine 208 mg/dL   AST   Result Value Ref Range    AST 16 0 - 45 U/L   ALT   Result Value Ref Range    ALT 27 0 - 50 U/L   Creatinine   Result Value Ref Range    Creatinine 0.58 0.52 - 1.04 mg/dL    GFR Estimate >90 >60 mL/min/[1.73_m2]    GFR Estimate If Black >90 >60 mL/min/[1.73_m2]   Hemoglobin   Result Value Ref Range    Hemoglobin 11.3 (L) 11.7 - 15.7 g/dL   Platelet count   Result Value Ref Range    Platelet Count 208 150 - 450 10e9/L     Assessment/Plan: Liyah Rodriguez is a 29 year old female  at 31w5d, presenting with complaints of elevated blood pressures, ruled out for preeclampsia with severe features.  - chronic hypertension, worsening BP at home: asymptomatic and by and large normotensive here but with two mild-range.  No current indication for starting antihypertensive.  HELLP labs normal.  Patient will bring blood pressure cuff to next visit to compare to clinic cuff.  - Fetus: reactive NST  - Dispo: to home with follow up as scheduled. Discussed tylenol for pain as needed. Discussed labor warning signs and indication to return to care.    Mirian Richard MD  OBGYN PGY-4  3:38 AM 2020    Physician Attestation   I, Arabella Garnica MD, personally examined and evaluated this patient.  I discussed the patient with the resident/fellow and care team, and agree with the assessment and plan of care as documented in the note of 20.      I personally reviewed vital signs, medications and labs.    Jiménez findings: Liyah Rodriguez is a 29 year old  at 31w5d with chronic hypertension who presents with new onset severe range blood pressures on home cuff today and sensation of bounding pulse.  She presents for serial  blood pressure monitoring and rule out pre-eclampsia.  Pre-e labs WNL with normal UPC.  Blood pressures are normal to mild range.  No indication to start antihypertensive medications at this time.  Patient currently wearing holter monitor and will drop it off tomorrow morning for analysis.  Plan for clinic visit early next week for BP check and was instructed to bring home BP cuff for calibration.   Arabella Garnica MD  Date of Service (when I saw the patient): 04/16/20

## 2020-04-16 NOTE — TELEPHONE ENCOUNTER
"\"I am 31 weeks and I am wearing a 24 hr Holter Monitor. It will shut off tomorrow am at 8. I am wearing it because I have been having tachycardia. My blood pressure is usually 130s-140s/80s, but tonight I was sitting watching TV and it felt like my heart was pounding really hard. I checked my BP it's 158/90 and 158/92 P 96. I do not have any other sx. \"Denies headache, visual changes other sx. Per triage protocol, advised ED. Due to Covid 19 virus I paged on call Dr. Garnica ([facility]) through page op at 11:41 pm to call FNA at [phone]. No response, paged a second time through page op at 11:55 pm to call patient directly at [phone].  Rhoda De La Garza RN [facility]        Reason for Disposition    [1] Pregnant > 20 weeks AND [2] BP Systolic BP  >= 140 OR Diastolic >= 90    Additional Information    Negative: Difficult to awaken or acting confused (e.g., disoriented, slurred speech)    Negative: Severe difficulty breathing (e.g., struggling for each breath, speaks in single words)    Negative: [1] Weakness of the face, arm or leg on one side of the body AND [2] new onset    Negative: [1] Numbness (i.e., loss of sensation) of the face, arm or leg on one side of the body AND [2] new onset    Negative: [1] Chest pain lasts > 5 minutes AND [2] history of heart disease  (i.e., heart attack, bypass surgery, angina, angioplasty, CHF)    Negative: [1] Chest pain AND [2] took nitrogylcerin AND [3] pain was not relieved    Negative: Sounds like a life-threatening emergency to the triager    Negative: Symptom is main concern  (e.g., headache, chest pain)    Negative: Low blood pressure is main concern    Negative: [1] Systolic BP  >= 160 OR Diastolic >= 100 AND [2] cardiac or neurologic symptoms (e.g., chest pain, difficulty breathing, unsteady gait, blurred vision)    Negative: [1] Pregnant > 20 weeks AND [2] new hand or face swelling    Protocols used: HIGH BLOOD PRESSURE-A-AH      "

## 2020-04-17 ENCOUNTER — TELEPHONE (OUTPATIENT)
Dept: OBGYN | Facility: CLINIC | Age: 30
End: 2020-04-17

## 2020-04-17 DIAGNOSIS — Z34.93 PRENATAL CARE IN THIRD TRIMESTER: Primary | ICD-10-CM

## 2020-04-21 ENCOUNTER — PRENATAL OFFICE VISIT (OUTPATIENT)
Dept: OBGYN | Facility: CLINIC | Age: 30
End: 2020-04-21
Payer: COMMERCIAL

## 2020-04-21 VITALS
HEART RATE: 95 BPM | WEIGHT: 228.4 LBS | SYSTOLIC BLOOD PRESSURE: 136 MMHG | DIASTOLIC BLOOD PRESSURE: 96 MMHG | HEIGHT: 63 IN | TEMPERATURE: 98.9 F | BODY MASS INDEX: 40.47 KG/M2

## 2020-04-21 DIAGNOSIS — R00.0 TACHYCARDIA: ICD-10-CM

## 2020-04-21 DIAGNOSIS — Z34.00 SUPERVISION OF NORMAL FIRST PREGNANCY, ANTEPARTUM: Primary | ICD-10-CM

## 2020-04-21 LAB — TSH SERPL DL<=0.005 MIU/L-ACNC: 2.58 MU/L (ref 0.4–4)

## 2020-04-21 PROCEDURE — 99207 ZZC PRENATAL VISIT: CPT | Performed by: OBSTETRICS & GYNECOLOGY

## 2020-04-21 PROCEDURE — 84443 ASSAY THYROID STIM HORMONE: CPT | Performed by: OBSTETRICS & GYNECOLOGY

## 2020-04-21 PROCEDURE — 36415 COLL VENOUS BLD VENIPUNCTURE: CPT | Performed by: OBSTETRICS & GYNECOLOGY

## 2020-04-21 ASSESSMENT — MIFFLIN-ST. JEOR: SCORE: 1730.15

## 2020-04-21 NOTE — PROGRESS NOTES
32w3d  Feeling well.  Was seen on labor and delivery last week for elevated BP's.  Not on meds, BP today inclinic is fine but her home cuff read 136/96 while clinic cuff read 132/81.  She was on the holter monitor for 24 hrs due to frequent episodes of maternal tachycardia up to 150's. Was essentially normal.  Will check TSH today. Consider cards consult if episodes of 150 persist.    May become tachycardic in labor would need to know if meds are necessary for that. Has next growth US in couple weeks.  Baby is moving well. RR

## 2020-04-22 ENCOUNTER — PRENATAL OFFICE VISIT (OUTPATIENT)
Dept: OBGYN | Facility: CLINIC | Age: 30
End: 2020-04-22
Payer: COMMERCIAL

## 2020-04-22 DIAGNOSIS — R00.0 TACHYCARDIA: ICD-10-CM

## 2020-04-22 DIAGNOSIS — Z34.00 SUPERVISION OF NORMAL FIRST PREGNANCY, ANTEPARTUM: Primary | ICD-10-CM

## 2020-04-22 PROCEDURE — 99207 ZZC PRENATAL VISIT: CPT | Performed by: OBSTETRICS & GYNECOLOGY

## 2020-04-22 NOTE — LETTER
[address] [id number] [phone]      April 22, 2020      Liyah Rodriguez  [facility] [facility] [id number]              To Whom It May Concern:    Liyah Rodriguez is being seen in our clinic for prenatal care.  Her Estimated Date of Delivery: Jun 13, 2020.  No LMP recorded. Patient is pregnant.. She has chronic hypertension so will need delivery 38-39 weeks.      Sincerely,        Yvonne Reese MD

## 2020-04-22 NOTE — PROGRESS NOTES
Phone visit for modified prenatal care for COVID.  holter monitor was done and normal but HR spikes even if doing the lowest activity.  Walked slowly into work the other day and changed into scrubs and heart rate was 150s.  Will do cardiology consult and this was ordered today.  BP's have been 130's/90's at home. Is planning on taking off work at 37 weeks already.  Work note done and discussed induction of labor 38 to 39 weeks if blood pressures are stable.  Has repeat growth ultrasound and follow-up appointment scheduled.  Discussed no further modified care. BE

## 2020-05-04 ENCOUNTER — VIRTUAL VISIT (OUTPATIENT)
Dept: CARDIOLOGY | Facility: CLINIC | Age: 30
End: 2020-05-04
Attending: OBSTETRICS & GYNECOLOGY
Payer: COMMERCIAL

## 2020-05-04 VITALS
WEIGHT: 224.1 LBS | SYSTOLIC BLOOD PRESSURE: 148 MMHG | BODY MASS INDEX: 39.71 KG/M2 | HEART RATE: 104 BPM | HEIGHT: 63 IN | DIASTOLIC BLOOD PRESSURE: 86 MMHG

## 2020-05-04 DIAGNOSIS — Z33.1 PREGNANT STATE, INCIDENTAL: ICD-10-CM

## 2020-05-04 DIAGNOSIS — I10 ESSENTIAL HYPERTENSION, BENIGN: Primary | ICD-10-CM

## 2020-05-04 DIAGNOSIS — R00.0 TACHYCARDIA: ICD-10-CM

## 2020-05-04 PROCEDURE — 99214 OFFICE O/P EST MOD 30 MIN: CPT | Mod: 95 | Performed by: INTERNAL MEDICINE

## 2020-05-04 RX ORDER — LABETALOL 100 MG/1
50 TABLET, FILM COATED ORAL 2 TIMES DAILY
Qty: 180 TABLET | Refills: 3 | Status: SHIPPED | OUTPATIENT
Start: 2020-05-04 | End: 2021-09-24

## 2020-05-04 ASSESSMENT — MIFFLIN-ST. JEOR: SCORE: 1710.64

## 2020-05-04 NOTE — PROGRESS NOTES
"Liyah Rodriguez is a 29 year old female who is being evaluated via a billable video visit.      The patient has been notified of following:     \"This video visit will be conducted via a call between you and your physician/provider. We have found that certain health care needs can be provided without the need for an in-person physical exam.  This service lets us provide the care you need with a video conversation.  If a prescription is necessary we can send it directly to your pharmacy.  If lab work is needed we can place an order for that and you can then stop by our lab to have the test done at a later time.    Video visits are billed at different rates depending on your insurance coverage.  Please reach out to your insurance provider with any questions.    If during the course of the call the physician/provider feels a video visit is not appropriate, you will not be charged for this service.\"    Patient has given verbal consent for Video visit? Yes    How would you like to obtain your AVS? Tee    Patient would like the video invitation sent by: Text to cell phone: [phone] doximity    Will anyone else be joining your video visit? Yes,  Claudio        Video-Visit Details    Type of service:  Video Visit    Video Start Time: 1:29 PM  Video End Time: 1:46 PM    Originating Location (pt. Location): Home    Distant Location (provider location):  [facility]    Platform used for Video Visit: [facility]    Review Of Systems  Skin: NEGATIVE  Eyes:Ears/Nose/Throat: NEGATIVE  Respiratory: NEGATIVE  Cardiovascular:NEGATIVE  Gastrointestinal: NEGATIVE  Genitourinary:NEGATIVE   Musculoskeletal: NEGATIVE  Neurologic: NEGATIVE  Psychiatric: NEGATIVE  Hematologic/Lymphatic/Immunologic: NEGATIVE  Endocrine:  NEGATIVE  Vital signs reported by patient  BP. 148/86  P. 104  Wt.224.1lb  Ht. 5'3\"  Trini Pulido Lpn    Cardiology Consultation Note:  Physician location:  " office  Video method used: travelfox renny    ROS, PMH, PSurgHx, FamHx, SocHx, medication list reviewed in EMR.    Video Exam:  General: No apparent distress. Appears stated age.  Eyes: Normal sclera  Neck: No JVD  Psych: Affect normal, no pressured speech or flights of fancy.  Respiratory: Unlabored, symmetric. Normal rate. No stridor or audible wheezing.  Skin: No facial lesions or bruises.  Musculoskeletal: Symmetric range of movement and coordination bilateral upper extremities, torso and neck.  Abdomen: No guarding  Neurologic: Grossly nonfocal.    The rest of a comprehensive physical examination is deferred due to [facility] (public health emergency) video visit restrictions.          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  She has noticed increased heart rates but no significant shortness of breath or lower extremity edema.  Her blood pressures have also crept up.  Her father  of an aneurysm and he had hypertension as did her mother.    Consult was requested today for her hypertension and tachycardia.  Monitor did not show any malignant arrhythmia.  Patient does not have any decrease in her functional status.  She does wear compression stockings when at work.    She states with activity, her heart rate can raise up to about 160 bpm.                      Assessment and Plan:         1. Hypertension and tachycardia, largely asymptomatic    At this time, with the stress of potential coronavirus in the community and her pregnancy, adrenergic tone is likely elevated.  Have offered her labetalol 50 mg twice daily and may titrate up if she feels well on the medication.  We discussed doing this so that she would not have inappropriate spikes of hypertension and tachycardia when laboring.    Could consider adding diuretic or calcium channel blocker, but in the meantime would use the labetalol up to 150 mg twice daily  before adding second agent.  We will start slow and use the minimum amount of medication possible.    We did discuss that after delivery, she should maintain a healthy weight as she likely has genetic propensity towards hypertension in the future and with her family history of aneurysms we may want to do appropriate screening of her vasculature down the road.    If she develops any dyspnea on exertion or lower extremity edema, would want echocardiogram to assess for peripartum cardiomyopathy.  At this time, functional status is unchanged and will defer imaging at this time.    Follow-up in 2 weeks with a video visit with my nurse practitioner to assess symptoms on the labetalol and possibly titrate up if needed.      This note was transcribed using electronic voice recognition software and there may be typographical errors present.          Benjamin Amaya MD

## 2020-05-04 NOTE — LETTER
2020      RE: Liyah Rodriguez  [address]  [place] [address]       Dear Colleague,    Thank you for the opportunity to participate in the care of your patient, Liyah Rodriguez, at the [facility]. Please see a copy of my visit note below.        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  She has noticed increased heart rates but no significant shortness of breath or lower extremity edema.  Her blood pressures have also crept up.  Her father  of an aneurysm and he had hypertension as did her mother.    Consult was requested today for her hypertension and tachycardia.  Monitor did not show any malignant arrhythmia.  Patient does not have any decrease in her functional status.  She does wear compression stockings when at work.    She states with activity, her heart rate can raise up to about 160 bpm.                 Assessment and Plan:         1. Hypertension and tachycardia, largely asymptomatic    At this time, with the stress of potential coronavirus in the community and her pregnancy, adrenergic tone is likely elevated.  Have offered her labetalol 50 mg twice daily and may titrate up if she feels well on the medication.  We discussed doing this so that she would not have inappropriate spikes of hypertension and tachycardia when laboring.    Could consider adding diuretic or calcium channel blocker, but in the meantime would use the labetalol up to 150 mg twice daily before adding second agent.  We will start slow and use the minimum amount of medication possible.    We did discuss that after delivery, she should maintain a healthy weight as she likely has genetic propensity towards hypertension in the future and with her family history of aneurysms we may want to do appropriate  screening of her vasculature down the road.    If she develops any dyspnea on exertion or lower extremity edema, would want echocardiogram to assess for peripartum cardiomyopathy.  At this time, functional status is unchanged and will defer imaging at this time.    Follow-up in 2 weeks with a video visit with my nurse practitioner to assess symptoms on the labetalol and possibly titrate up if needed.      This note was transcribed using electronic voice recognition software and there may be typographical errors present.    Please do not hesitate to contact me if you have any questions/concerns.     Sincerely,     Benjamin Amaya MD

## 2020-05-05 ENCOUNTER — HOSPITAL ENCOUNTER (OUTPATIENT)
Dept: ULTRASOUND IMAGING | Facility: CLINIC | Age: 30
End: 2020-05-05
Attending: OBSTETRICS & GYNECOLOGY
Payer: COMMERCIAL

## 2020-05-05 ENCOUNTER — OFFICE VISIT (OUTPATIENT)
Dept: MATERNAL FETAL MEDICINE | Facility: CLINIC | Age: 30
End: 2020-05-05
Attending: OBSTETRICS & GYNECOLOGY
Payer: COMMERCIAL

## 2020-05-05 DIAGNOSIS — O10.919 HTN IN PREGNANCY, CHRONIC: ICD-10-CM

## 2020-05-05 DIAGNOSIS — O10.919 CHRONIC HYPERTENSION DURING PREGNANCY, ANTEPARTUM: Primary | ICD-10-CM

## 2020-05-05 PROCEDURE — 76819 FETAL BIOPHYS PROFIL W/O NST: CPT | Performed by: OBSTETRICS & GYNECOLOGY

## 2020-05-05 PROCEDURE — 76816 OB US FOLLOW-UP PER FETUS: CPT

## 2020-05-05 NOTE — PROGRESS NOTES
Please see full imaging report from ViewPoint program under imaging tab.      Tito Altamirano MD  Maternal Fetal Medicine

## 2020-05-06 ENCOUNTER — PRENATAL OFFICE VISIT (OUTPATIENT)
Dept: OBGYN | Facility: CLINIC | Age: 30
End: 2020-05-06
Payer: COMMERCIAL

## 2020-05-06 VITALS — DIASTOLIC BLOOD PRESSURE: 66 MMHG | WEIGHT: 227.5 LBS | SYSTOLIC BLOOD PRESSURE: 120 MMHG | BODY MASS INDEX: 40.3 KG/M2

## 2020-05-06 DIAGNOSIS — O10.919 CHRONIC HYPERTENSION AFFECTING PREGNANCY: Primary | ICD-10-CM

## 2020-05-06 PROCEDURE — 99207 ZZC PRENATAL VISIT: CPT | Performed by: OBSTETRICS & GYNECOLOGY

## 2020-05-06 NOTE — PROGRESS NOTES
34w4d feeling ok, no c/o.  Good fm.  Had virtual visit with cards after holter and dx with sinus tachycardia and started on labetalol 50mg BID.  Has f/u in 2-3 wks.  No side effects at this time.  Good FM. BPP 8/8 yesterday.    Discussed COVID testing policy in hospital either upon admit or 2-3 days prior to scheduled induction or surgery.  Discussed universal masking and need for support person and self to wear mask at all times when any staff in room.  Encouraged to bring masks from home if available, otherwise they will be provided by the hospital. Discussed full PPE if COVID + and limited staff interaction as appropriate.  Discussed support person can remain with her if asymptomatic and currently lives with her, if no to either, would need to leave.    Plan visit in 2 weeks charline

## 2020-05-12 ENCOUNTER — OFFICE VISIT (OUTPATIENT)
Dept: MATERNAL FETAL MEDICINE | Facility: CLINIC | Age: 30
End: 2020-05-12
Attending: OBSTETRICS & GYNECOLOGY
Payer: COMMERCIAL

## 2020-05-12 ENCOUNTER — HOSPITAL ENCOUNTER (OUTPATIENT)
Dept: ULTRASOUND IMAGING | Facility: CLINIC | Age: 30
End: 2020-05-12
Attending: OBSTETRICS & GYNECOLOGY
Payer: COMMERCIAL

## 2020-05-12 DIAGNOSIS — O10.919 CHRONIC HYPERTENSION DURING PREGNANCY, ANTEPARTUM: Primary | ICD-10-CM

## 2020-05-12 DIAGNOSIS — O10.919 CHRONIC HYPERTENSION DURING PREGNANCY, ANTEPARTUM: ICD-10-CM

## 2020-05-12 PROCEDURE — 76819 FETAL BIOPHYS PROFIL W/O NST: CPT

## 2020-05-18 ENCOUNTER — NURSE TRIAGE (OUTPATIENT)
Dept: NURSING | Facility: CLINIC | Age: 30
End: 2020-05-18

## 2020-05-18 NOTE — TELEPHONE ENCOUNTER
"Pt 36w2d pregnant, reports intermittent episodes of mild RUQ abdominal pain.  She had two episodes in the past two weeks, another episode last evening and again this evening.  Pain started after eating a meal.  Pain lasts for 30 min - 90 minutes.  Pt denies other symptoms.  She is not currently experiencing pain.     Disposition:  See a provider within 2 weeks per protocol.  Pt has an appt on 05/21/20. Advised pt to keep her appt and call back with any new/worsening symptoms. She verbalized understanding and had no further questions.     Val Alexandra RN/FNA    Reason for Disposition    [1] Upper abdominal pains AND [2] occur regularly about 1 hour after meals    Additional Information    Negative: Passed out (i.e., lost consciousness, collapsed and was not responding)    Negative: Shock suspected (e.g., cold/pale/clammy skin, too weak to stand, low BP, rapid pulse)    Negative: Difficult to awaken or acting confused (e.g., disoriented, slurred speech)    Negative: [1] SEVERE abdominal pain (e.g., excruciating) AND [2] constant AND [3] present > 1 hour    Negative: Severe vaginal bleeding (e.g., continuous red blood from vagina, or large blood clots)    Negative: Sounds like a life-threatening emergency to the triager    Negative: [1] Vomiting AND [2] contains red blood or black (\"coffee ground\") material  (Exception: few red streaks in vomit that only happened once)    Negative: MODERATE-SEVERE abdominal pain (e.g., interferes with normal activities, awakens from sleep)    Negative: Vaginal bleeding or spotting    Negative: [1] Baby moving less today (e.g., kick count < 5 in 1 hour or < 10 in 2 hours) AND [2] pregnant 23 or more weeks    Negative: Leakage of fluid from vagina    Negative: New hand or face swelling    Negative: New blurred vision or vision changes    Negative: [1] SEVERE headache AND [2] not relieved with acetaminophen (e.g., Tylenol)    Negative: [1] MILD abdominal pain (e.g., doesn't interfere " with normal activities) AND [2] constant AND [3] present > 2 hours    Negative: [1] Intermittent lower abdominal pain AND [2] present > 24 hours    Negative: Fever > 100.4 F (38.0 C)    Negative: Blood in urine (red, pink, or tea-colored)    Negative: White of the eyes have turned yellow (i.e., jaundice)    Negative: Patient sounds very sick or weak to the triager    Negative: Pain or burning with passing urine (urination)    Negative: Unusual vaginal discharge (e.g., bad smelling, yellow, green, or foamy-white)    Negative: [1] Upper abdominal pains AND [2] radiate into chest, with sour taste in mouth    Protocols used: PREGNANCY - ABDOMINAL PAIN GREATER THAN 20 WEEKS EGA-A-

## 2020-05-18 NOTE — TELEPHONE ENCOUNTER
Clinic Action Needed: Yes, please follow up with patient and advise if she should be seen sooner than 05/21.     Presenting Problem: See triage note below.      Routed to: LATONIA Alexandra RN/FNA

## 2020-05-19 ENCOUNTER — OFFICE VISIT (OUTPATIENT)
Dept: MATERNAL FETAL MEDICINE | Facility: CLINIC | Age: 30
End: 2020-05-19
Attending: OBSTETRICS & GYNECOLOGY
Payer: COMMERCIAL

## 2020-05-19 ENCOUNTER — HOSPITAL ENCOUNTER (OUTPATIENT)
Dept: ULTRASOUND IMAGING | Facility: CLINIC | Age: 30
End: 2020-05-19
Attending: OBSTETRICS & GYNECOLOGY
Payer: COMMERCIAL

## 2020-05-19 DIAGNOSIS — O10.919 CHRONIC HYPERTENSION DURING PREGNANCY, ANTEPARTUM: ICD-10-CM

## 2020-05-19 DIAGNOSIS — O10.919 CHRONIC HYPERTENSION DURING PREGNANCY, ANTEPARTUM: Primary | ICD-10-CM

## 2020-05-19 PROCEDURE — 76819 FETAL BIOPHYS PROFIL W/O NST: CPT

## 2020-05-19 NOTE — TELEPHONE ENCOUNTER
Spoke with patient. Feeling well. Reviewed s/s of preeclampsia. No symptoms noted. Blood pressures have remained in the 140's/80's-90's. Patient will continue to monitor and plan on keeping scheduled appointment. Danae Key RN

## 2020-05-19 NOTE — TELEPHONE ENCOUNTER
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     As long as BP's are stable, fine to wait until scheduled appt. Please review preeclampsia signs and sx though since she is at risk for this.    Thanks  Yvonne Reese MD

## 2020-05-19 NOTE — TELEPHONE ENCOUNTER
TC to patient. Pt is 36w3d, pt has not had the pain since she talked to the nurse yesterday. States that it feels different than reflux. Discussed with patient monitoring. Will route to provider per patient request. Ok to wait until appt on 5/21? Or should she be seen sooner? She would have to go to L&D as we have zero appts in clinic. Please advise. Thanks.   Danae Mota, RN-BSN

## 2020-05-20 ENCOUNTER — VIRTUAL VISIT (OUTPATIENT)
Dept: CARDIOLOGY | Facility: CLINIC | Age: 30
End: 2020-05-20
Attending: INTERNAL MEDICINE
Payer: COMMERCIAL

## 2020-05-20 VITALS
BODY MASS INDEX: 40.04 KG/M2 | HEART RATE: 97 BPM | WEIGHT: 226 LBS | SYSTOLIC BLOOD PRESSURE: 150 MMHG | HEIGHT: 63 IN | DIASTOLIC BLOOD PRESSURE: 98 MMHG

## 2020-05-20 DIAGNOSIS — Z33.1 PREGNANT STATE, INCIDENTAL: ICD-10-CM

## 2020-05-20 DIAGNOSIS — I10 ESSENTIAL HYPERTENSION, BENIGN: ICD-10-CM

## 2020-05-20 DIAGNOSIS — R00.0 TACHYCARDIA: ICD-10-CM

## 2020-05-20 PROCEDURE — 99213 OFFICE O/P EST LOW 20 MIN: CPT | Mod: 95 | Performed by: NURSE PRACTITIONER

## 2020-05-20 ASSESSMENT — MIFFLIN-ST. JEOR: SCORE: 1719.26

## 2020-05-20 NOTE — PROGRESS NOTES
"Liyah Rodriguez is a 29 year old female who is being evaluated via a billable telephone visit.      The patient has been notified of following:     \"This telephone visit will be conducted via a call between you and your physician/provider. We have found that certain health care needs can be provided without the need for a physical exam.  This service lets us provide the care you need with a short phone conversation.  If a prescription is necessary we can send it directly to your pharmacy.  If lab work is needed we can place an order for that and you can then stop by our lab to have the test done at a later time.    Telephone visits are billed at different rates depending on your insurance coverage. During this emergency period, for some insurers they may be billed the same as an in-person visit.  Please reach out to your insurance provider with any questions.    If during the course of the call the physician/provider feels a telephone visit is not appropriate, you will not be charged for this service.\"    Patient has given verbal consent for Telephone visit?  Yes    What phone number would you like to be contacted at? [phone]    How would you like to obtain your AVS? MyChart   Vital signs reported by patient  BP. 150/98  P. 97  Wt. 226lb  Ht. 5'3\"  Review Of Systems  Skin: NEGATIVE  Eyes:Ears/Nose/Throat: NEGATIVE  Respiratory: NEGATIVE  Cardiovascular:NEGATIVE  Gastrointestinal: NEGATIVE  Genitourinary:NEGATIVE   Musculoskeletal: NEGATIVE  Neurologic: NEGATIVE  Psychiatric: NEGATIVE  Hematologic/Lymphatic/Immunologic: NEGATIVE  Endocrine:  NEGATIVE  Trini Pulido Lpn    HPI and Plan:   I had the pleasure of seeing Liyah Rodriguez today at [facility] [place] for evaluation of tachycardia. She is a pleasant 29 year old patient of Dr. Amaya.     Ms. Rodriguez has a past medical history significant for hypertension and tachycardia.  She has a family history of hypertension " in both mother and father.  Unfortunately, her father  of an aneurysm at an early age.  The patient is currently 36 weeks pregnant with her first term pregnancy.    She was referred to cardiology and seen by Dr. Amaya in May 2020 for hypertension and tachycardia.  She wore a monitor that did not show any malignant arrhythmia.  Her heart rate would elevate 260 bpm with minimal activity and her blood pressure maintained 140s/80s.  She was started on labetalol 50 mg twice daily.    Today she presents to a virtual cardiology visit to assess her blood pressure and heart rate.  She is doing well without complaints of peripheral edema, shortness of breath, orthopnea, PND.  She is tolerating the labetalol without significant side effects.  She does note occasional dizziness.  Her heart rates are maintaining in the 80s to 90s beats per minute.  Her blood pressure is typically 140s/80s without significant change.  Today her blood pressure is elevated at 150/89 however she believes that is nervous.  She rechecked her blood pressure retirement through our conversation and her blood pressure was down to 141/82.    ROS:  12-pt ROS is negative except for as noted above.    No Physical Exam due to Telephone Visit    Assessment and Plan  1.  Hypertension and tachycardia, largely asymptomatic.  The patient was started on labetalol 50 mg twice daily for both tachycardia and hypertension.  Her blood pressure is suboptimally controlled at 140s/80s.  She tells me her OB/GYN is not concerned as this has been her blood pressure throughout her pregnancy.  She does not feel the labetalol had a large effect.  We discussed increasing this to gain better control of her blood pressure however, she would like to maintain this dose as she gets occasional dizziness.  I have asked her to get adequate fluid intake and continue compression stockings.  I have given her the option of a diuretic or calcium channel blocker however at this time we will just  continue with labetalol.  We had a long discussion about a low-sodium diet.  I have asked her to follow-up in 2 to 3 weeks following delivery.  We will likely do screening of her vasculature down the road given her family history of aneurysm.  Please contact cardiology with any questions or concerns.    Thank you for allowing me to care for Liyah Rodriguez today.    Phone duration: 7 minutes     This visit is being conducted as a virtual visit due to the emphasis on mitigation of the COVID-19 virus pandemic. The clinician has decided that the risk of an in-office visit outweighs the benefit for this patient.     DALE Barron, CNP  Cardiology    Voice recognition software was used for this note, I have reviewed this note, but errors may have been missed.    No orders of the defined types were placed in this encounter.    No orders of the defined types were placed in this encounter.    There are no discontinued medications.      CURRENT MEDICATIONS:  Current Outpatient Medications   Medication Sig Dispense Refill     acetaminophen (TYLENOL) 500 MG tablet Take 500-1,000 mg by mouth every 6 hours as needed for mild pain       aspirin (ASA) 81 MG chewable tablet Take 81 mg by mouth daily       Doxylamine Succinate, Sleep, (UNISOM PO) Take 12.5 mg by mouth nightly as needed        ferrous sulfate (FEROSUL) 325 (65 Fe) MG tablet Take 325 mg by mouth daily (with breakfast)       labetalol (NORMODYNE) 100 MG tablet Take 0.5 tablets (50 mg) by mouth 2 times daily 180 tablet 3     Prenatal Vit-Fe Fumarate-FA (PRENATAL VITAMIN PO) Take 1 tablet by mouth daily          ALLERGIES     Allergies   Allergen Reactions     Nkda [No Known Drug Allergies]        PAST MEDICAL HISTORY:  Past Medical History:   Diagnosis Date     NO ACTIVE PROBLEMS        PAST SURGICAL HISTORY:  Past Surgical History:   Procedure Laterality Date     ACL repair  06/2008     CARDIAC SURGERY      TACHYCARDIA, WEARING HALTER MONITOR      DILATION AND CURETTAGE SUCTION N/A 5/24/2019    Procedure: Suction Dilation and Curettage   (9 Weeks and 5 Days);  Surgeon: Coni Barnett MD;  Location: [facility]     ORTHOPEDIC SURGERY Right     knee surgery     Right lateral menisectomy  06/2008     XR HYSTEROSALPINGOGRAM  02/08/2019    normal fill and spill       FAMILY HISTORY:  Family History   Problem Relation Age of Onset     Heart Disease Paternal Grandmother         CHF     Hypertension Mother      Hypertension Maternal Grandmother      High cholesterol Maternal Grandmother      Cancer Maternal Grandfather         lung cancer     Cancer Father 50        lymphoma     Aneurysm Father 63        spotty vision, occasional weakness prior- then sudden death, smoker       SOCIAL HISTORY:  Social History     Socioeconomic History     Marital status:      Spouse name: None     Number of children: None     Years of education: None     Highest education level: None   Occupational History     Employer: [employer]     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[employer]   Social Needs     Financial resource strain: None     Food insecurity     Worry: None     Inability: None     Transportation needs     Medical: None     Non-medical: None   Tobacco Use     Smoking status: Never Smoker     Smokeless tobacco: Never Used   Substance and Sexual Activity     Alcohol use: None     Comment: occ     Drug use: None     Sexual activity: Yes     Partners: Male     Birth control/protection: None   Lifestyle     Physical activity     Days per week: None     Minutes per session: None     Stress: Not at all   Relationships     Social connections     Talks on phone: None     Gets together: None     Attends [employer] service: None     Active member of club or organization: None     Attends meetings of clubs or organizations: None     Relationship status: None     Intimate partner violence     Fear of current or ex partner: No     Emotionally abused: No     Physically abused: No     Forced sexual activity: No    Other Topics Concern     None   Social History Narrative     None       Recent Lab Results:  LIPID RESULTS:  Lab Results   Component Value Date    CHOL 145 12/08/2015    HDL 38 (L) 12/08/2015    LDL 92 12/08/2015    TRIG 73 12/08/2015    CHOLHDLRATIO 4.0 12/05/2014       LIVER ENZYME RESULTS:  Lab Results   Component Value Date    AST 16 04/16/2020    ALT 27 04/16/2020       CBC RESULTS:  Lab Results   Component Value Date    WBC 16.9 (H) 12/16/2019    RBC 4.62 12/16/2019    HGB 11.3 (L) 04/16/2020    HCT 41.0 12/16/2019    MCV 89 12/16/2019    MCH 29.9 12/16/2019    MCHC 33.7 12/16/2019    RDW 12.8 12/16/2019     04/16/2020       BMP RESULTS:  Lab Results   Component Value Date     12/16/2019    POTASSIUM 3.9 12/16/2019    CHLORIDE 108 12/16/2019    CO2 20 12/16/2019    ANIONGAP 10 12/16/2019     (H) 12/16/2019    BUN 12 12/16/2019    CR 0.58 04/16/2020    GFRESTIMATED >90 04/16/2020    GFRESTBLACK >90 04/16/2020    JACKIE 8.8 12/16/2019        A1C RESULTS:  No results found for: A1C    INR RESULTS:  No results found for: INR        CC  Benjamin Amaya MD  [address]  [place] [id number]

## 2020-05-20 NOTE — LETTER
2020    Soumya Castellon MD  [address]  [place] [address]    RE: Liyah Rodriguez       Dear Colleague,    I had the pleasure of seeing Liyah Rodriguez in the [facility].  I had the pleasure of seeing Liyah Rodriguez today at [facility] for evaluation of tachycardia. She is a pleasant 29 year old patient of Dr. Amaya.     Ms. Rodriguez has a past medical history significant for hypertension and tachycardia.  She has a family history of hypertension in both mother and father.  Unfortunately, her father  of an aneurysm at an early age.  The patient is currently 36 weeks pregnant with her first term pregnancy.    She was referred to cardiology and seen by Dr. Amaya in May 2020 for hypertension and tachycardia.  She wore a monitor that did not show any malignant arrhythmia.  Her heart rate would elevate 260 bpm with minimal activity and her blood pressure maintained 140s/80s.  She was started on labetalol 50 mg twice daily.    Today she presents to a virtual cardiology visit to assess her blood pressure and heart rate.  She is doing well without complaints of peripheral edema, shortness of breath, orthopnea, PND.  She is tolerating the labetalol without significant side effects.  She does note occasional dizziness.  Her heart rates are maintaining in the 80s to 90s beats per minute.  Her blood pressure is typically 140s/80s without significant change.  Today her blood pressure is elevated at 150/89 however she believes that is nervous.  She rechecked her blood pressure skilled nursing through our conversation and her blood pressure was down to 141/82.    ROS:  12-pt ROS is negative except for as noted above.    No Physical Exam due to Telephone Visit    Assessment and Plan  1.  Hypertension and tachycardia, largely asymptomatic.  The patient was started on labetalol 50 mg twice daily for both tachycardia and  hypertension.  Her blood pressure is suboptimally controlled at 140s/80s.  She tells me her OB/GYN is not concerned as this has been her blood pressure throughout her pregnancy.  She does not feel the labetalol had a large effect.  We discussed increasing this to gain better control of her blood pressure however, she would like to maintain this dose as she gets occasional dizziness.  I have asked her to get adequate fluid intake and continue compression stockings.  I have given her the option of a diuretic or calcium channel blocker however at this time we will just continue with labetalol.  We had a long discussion about a low-sodium diet.  I have asked her to follow-up in 2 to 3 weeks following delivery.  We will likely do screening of her vasculature down the road given her family history of aneurysm.  Please contact cardiology with any questions or concerns.    Thank you for allowing me to care for Liyah Rodriguez today.    Phone duration: 7 minutes     This visit is being conducted as a virtual visit due to the emphasis on mitigation of the COVID-19 virus pandemic. The clinician has decided that the risk of an in-office visit outweighs the benefit for this patient.     DALE Barron, CNP  Cardiology    Voice recognition software was used for this note, I have reviewed this note, but errors may have been missed.    No orders of the defined types were placed in this encounter.    No orders of the defined types were placed in this encounter.    There are no discontinued medications.      CURRENT MEDICATIONS:  Current Outpatient Medications   Medication Sig Dispense Refill     acetaminophen (TYLENOL) 500 MG tablet Take 500-1,000 mg by mouth every 6 hours as needed for mild pain       aspirin (ASA) 81 MG chewable tablet Take 81 mg by mouth daily       Doxylamine Succinate, Sleep, (UNISOM PO) Take 12.5 mg by mouth nightly as needed        ferrous sulfate (FEROSUL) 325 (65 Fe) MG tablet Take 325 mg  by mouth daily (with breakfast)       labetalol (NORMODYNE) 100 MG tablet Take 0.5 tablets (50 mg) by mouth 2 times daily 180 tablet 3     Prenatal Vit-Fe Fumarate-FA (PRENATAL VITAMIN PO) Take 1 tablet by mouth daily          ALLERGIES     Allergies   Allergen Reactions     Nkda [No Known Drug Allergies]        PAST MEDICAL HISTORY:  Past Medical History:   Diagnosis Date     NO ACTIVE PROBLEMS        PAST SURGICAL HISTORY:  Past Surgical History:   Procedure Laterality Date     ACL repair  06/2008     CARDIAC SURGERY      TACHYCARDIA, WEARING HALTER MONITOR     DILATION AND CURETTAGE SUCTION N/A 5/24/2019    Procedure: Suction Dilation and Curettage   (9 Weeks and 5 Days);  Surgeon: Coni Barnett MD;  [facility]     ORTHOPEDIC SURGERY Right     knee surgery     Right lateral menisectomy  06/2008     XR HYSTEROSALPINGOGRAM  02/08/2019    normal fill and spill       FAMILY HISTORY:  Family History   Problem Relation Age of Onset     Heart Disease Paternal Grandmother         CHF     Hypertension Mother      Hypertension Maternal Grandmother      High cholesterol Maternal Grandmother      Cancer Maternal Grandfather         lung cancer     Cancer Father 50        lymphoma     Aneurysm Father 63        spotty vision, occasional weakness prior- then sudden death, smoker       SOCIAL HISTORY:  Social History     Socioeconomic History     Marital status:      Spouse name: None     Number of children: None     Years of education: None     Highest education level: None   Occupational History     Employer: [employer]     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[employer]   Social Needs     Financial resource strain: None     Food insecurity     Worry: None     Inability: None     Transportation needs     Medical: None     Non-medical: None   Tobacco Use     Smoking status: Never Smoker     Smokeless tobacco: Never Used   Substance and Sexual Activity     Alcohol use: None     Comment: occ     Drug use: None     Sexual activity: Yes      Partners: Male     Birth control/protection: None   Lifestyle     Physical activity     Days per week: None     Minutes per session: None     Stress: Not at all   Relationships     Social connections     Talks on phone: None     Gets together: None     Attends Rastafari service: None     Active member of club or organization: None     Attends meetings of clubs or organizations: None     Relationship status: None     Intimate partner violence     Fear of current or ex partner: No     Emotionally abused: No     Physically abused: No     Forced sexual activity: No   Other Topics Concern     None   Social History Narrative     None       Recent Lab Results:  LIPID RESULTS:  Lab Results   Component Value Date    CHOL 145 12/08/2015    HDL 38 (L) 12/08/2015    LDL 92 12/08/2015    TRIG 73 12/08/2015    CHOLHDLRATIO 4.0 12/05/2014       LIVER ENZYME RESULTS:  Lab Results   Component Value Date    AST 16 04/16/2020    ALT 27 04/16/2020       CBC RESULTS:  Lab Results   Component Value Date    WBC 16.9 (H) 12/16/2019    RBC 4.62 12/16/2019    HGB 11.3 (L) 04/16/2020    HCT 41.0 12/16/2019    MCV 89 12/16/2019    MCH 29.9 12/16/2019    MCHC 33.7 12/16/2019    RDW 12.8 12/16/2019     04/16/2020       BMP RESULTS:  Lab Results   Component Value Date     12/16/2019    POTASSIUM 3.9 12/16/2019    CHLORIDE 108 12/16/2019    CO2 20 12/16/2019    ANIONGAP 10 12/16/2019     (H) 12/16/2019    BUN 12 12/16/2019    CR 0.58 04/16/2020    GFRESTIMATED >90 04/16/2020    GFRESTBLACK >90 04/16/2020    JACKIE 8.8 12/16/2019        A1C RESULTS:  No results found for: A1C    INR RESULTS:  No results found for: INR        Thank you for allowing me to participate in the care of your patient.    Sincerely,     DALE Barron [facility]

## 2020-05-21 ENCOUNTER — PRENATAL OFFICE VISIT (OUTPATIENT)
Dept: OBGYN | Facility: CLINIC | Age: 30
End: 2020-05-21
Payer: COMMERCIAL

## 2020-05-21 VITALS
BODY MASS INDEX: 40.77 KG/M2 | TEMPERATURE: 98.5 F | SYSTOLIC BLOOD PRESSURE: 133 MMHG | WEIGHT: 230.1 LBS | HEART RATE: 86 BPM | HEIGHT: 63 IN | DIASTOLIC BLOOD PRESSURE: 86 MMHG

## 2020-05-21 DIAGNOSIS — O10.919 CHRONIC HYPERTENSION AFFECTING PREGNANCY: ICD-10-CM

## 2020-05-21 DIAGNOSIS — Z34.00 SUPERVISION OF NORMAL FIRST PREGNANCY, ANTEPARTUM: Primary | ICD-10-CM

## 2020-05-21 LAB
ERYTHROCYTE [DISTWIDTH] IN BLOOD BY AUTOMATED COUNT: 14.5 % (ref 10–15)
HCT VFR BLD AUTO: 37 % (ref 35–47)
HGB BLD-MCNC: 12.2 G/DL (ref 11.7–15.7)
MCH RBC QN AUTO: 29.5 PG (ref 26.5–33)
MCHC RBC AUTO-ENTMCNC: 33 G/DL (ref 31.5–36.5)
MCV RBC AUTO: 89 FL (ref 78–100)
PLATELET # BLD AUTO: 182 10E9/L (ref 150–450)
RBC # BLD AUTO: 4.14 10E12/L (ref 3.8–5.2)
WBC # BLD AUTO: 10.7 10E9/L (ref 4–11)

## 2020-05-21 PROCEDURE — 84450 TRANSFERASE (AST) (SGOT): CPT | Performed by: OBSTETRICS & GYNECOLOGY

## 2020-05-21 PROCEDURE — 87653 STREP B DNA AMP PROBE: CPT | Performed by: OBSTETRICS & GYNECOLOGY

## 2020-05-21 PROCEDURE — 84156 ASSAY OF PROTEIN URINE: CPT | Performed by: OBSTETRICS & GYNECOLOGY

## 2020-05-21 PROCEDURE — 84460 ALANINE AMINO (ALT) (SGPT): CPT | Performed by: OBSTETRICS & GYNECOLOGY

## 2020-05-21 PROCEDURE — 85027 COMPLETE CBC AUTOMATED: CPT | Performed by: OBSTETRICS & GYNECOLOGY

## 2020-05-21 PROCEDURE — 36415 COLL VENOUS BLD VENIPUNCTURE: CPT | Performed by: OBSTETRICS & GYNECOLOGY

## 2020-05-21 PROCEDURE — 99207 ZZC PRENATAL VISIT: CPT | Performed by: OBSTETRICS & GYNECOLOGY

## 2020-05-21 ASSESSMENT — MIFFLIN-ST. JEOR: SCORE: 1737.86

## 2020-05-21 ASSESSMENT — PATIENT HEALTH QUESTIONNAIRE - PHQ9: SUM OF ALL RESPONSES TO PHQ QUESTIONS 1-9: 2

## 2020-05-21 NOTE — PROGRESS NOTES
36w5d  Has been taking labetalol 50 mg BID since seeing the cardiologist.  Does not feel like it is making much difference.  Worried about BP's.   She has chronic htn and checks BP's at home.  Gets ~ 140's / 80-90's.  Reports that a few days ago she woke up and did not feel well.  HR was ok, but she felt unusually tired and unwell.   Stopped working earlier this week. Also noted intermittent pain in the RUQ.  Will check HELLP labs and urine pn today.  discussed likely IOL at 38-39 wks unless abnormal labs today.   She has a clinic appt in one week but should be seen sooner prn symptoms.  GBS swab done today.  Bedside US confirms cephalic.  Cervix favorable today.  RR

## 2020-05-22 LAB
ALT SERPL W P-5'-P-CCNC: 31 U/L (ref 0–50)
AST SERPL W P-5'-P-CCNC: 22 U/L (ref 0–45)
CREAT UR-MCNC: 348 MG/DL
GP B STREP DNA SPEC QL NAA+PROBE: NEGATIVE
PROT UR-MCNC: 0.63 G/L
PROT/CREAT 24H UR: 0.18 G/G CR (ref 0–0.2)
SPECIMEN SOURCE: NORMAL

## 2020-05-26 ENCOUNTER — HOSPITAL ENCOUNTER (OUTPATIENT)
Dept: ULTRASOUND IMAGING | Facility: CLINIC | Age: 30
End: 2020-05-26
Attending: OBSTETRICS & GYNECOLOGY
Payer: COMMERCIAL

## 2020-05-26 ENCOUNTER — OFFICE VISIT (OUTPATIENT)
Dept: MATERNAL FETAL MEDICINE | Facility: CLINIC | Age: 30
End: 2020-05-26
Attending: OBSTETRICS & GYNECOLOGY
Payer: COMMERCIAL

## 2020-05-26 DIAGNOSIS — O10.919 CHRONIC HYPERTENSION DURING PREGNANCY, ANTEPARTUM: Primary | ICD-10-CM

## 2020-05-26 DIAGNOSIS — O10.919 CHRONIC HYPERTENSION DURING PREGNANCY, ANTEPARTUM: ICD-10-CM

## 2020-05-26 PROCEDURE — 76819 FETAL BIOPHYS PROFIL W/O NST: CPT

## 2020-05-26 PROCEDURE — 76816 OB US FOLLOW-UP PER FETUS: CPT

## 2020-05-26 NOTE — PROGRESS NOTES
"Please see \"Imaging\" tab under \"Chart Review\" for details of today's US at the [facility].    Richard Snell MD  Maternal-Fetal Medicine    "

## 2020-05-28 ENCOUNTER — PRENATAL OFFICE VISIT (OUTPATIENT)
Dept: OBGYN | Facility: CLINIC | Age: 30
End: 2020-05-28
Payer: COMMERCIAL

## 2020-05-28 VITALS
WEIGHT: 232 LBS | DIASTOLIC BLOOD PRESSURE: 78 MMHG | BODY MASS INDEX: 41.1 KG/M2 | SYSTOLIC BLOOD PRESSURE: 141 MMHG | TEMPERATURE: 97.4 F | HEART RATE: 108 BPM

## 2020-05-28 DIAGNOSIS — O10.919 CHRONIC HYPERTENSION AFFECTING PREGNANCY: Primary | ICD-10-CM

## 2020-05-28 PROCEDURE — 99207 ZZC PRENATAL VISIT: CPT | Performed by: OBSTETRICS & GYNECOLOGY

## 2020-05-28 NOTE — PROGRESS NOTES
Doing well.   BP stable, still on 50 bid labetalol.  Good fetal movement.  No signs of labor.   Discussed scheduling IOL for next week. Likely will need a little cervical ripening, scheduling for evening start.   Discussed tachycardia and how that may affect labor. She mostly tolerates it well. We discussed that nifedipine can cause tachycardia so it probably wouldn't be a good choice if she needs additional medication for blood pressure. Discussed that she may feel short of breath while pushing. It may trigger the sepsis protocol more easily. She understands these possibilities.   Precautions reviewed.

## 2020-05-29 ENCOUNTER — NURSE TRIAGE (OUTPATIENT)
Dept: NURSING | Facility: CLINIC | Age: 30
End: 2020-05-29

## 2020-05-29 ENCOUNTER — AMBULATORY - HEALTHEAST (OUTPATIENT)
Dept: INTERNAL MEDICINE | Facility: CLINIC | Age: 30
End: 2020-05-29

## 2020-05-29 DIAGNOSIS — Z20.822 COVID-19 RULED OUT: Primary | ICD-10-CM

## 2020-05-29 DIAGNOSIS — Z20.822 COVID-19 RULED OUT: ICD-10-CM

## 2020-05-29 NOTE — TELEPHONE ENCOUNTER
She Is calling to schedule a covid 19 test before her induction on 6/2. Transferred to the testing line to get her test scheduled.    Britt Stacy RN/ [facility]

## 2020-05-30 ENCOUNTER — AMBULATORY - HEALTHEAST (OUTPATIENT)
Dept: FAMILY MEDICINE | Facility: CLINIC | Age: 30
End: 2020-05-30

## 2020-05-30 DIAGNOSIS — Z20.822 COVID-19 RULED OUT: ICD-10-CM

## 2020-06-01 ENCOUNTER — COMMUNICATION - HEALTHEAST (OUTPATIENT)
Dept: FAMILY MEDICINE | Facility: CLINIC | Age: 30
End: 2020-06-01

## 2020-06-02 ENCOUNTER — HOSPITAL ENCOUNTER (INPATIENT)
Facility: CLINIC | Age: 30
LOS: 3 days | Discharge: HOME HEALTH | End: 2020-06-05
Attending: OBSTETRICS & GYNECOLOGY | Admitting: OBSTETRICS & GYNECOLOGY
Payer: COMMERCIAL

## 2020-06-02 PROBLEM — Z34.90 ENCOUNTER FOR INDUCTION OF LABOR: Status: ACTIVE | Noted: 2020-06-02

## 2020-06-02 LAB
ABO + RH BLD: NORMAL
ABO + RH BLD: NORMAL
ALT SERPL W P-5'-P-CCNC: 29 U/L (ref 0–50)
AST SERPL W P-5'-P-CCNC: 18 U/L (ref 0–45)
BASOPHILS # BLD AUTO: 0 10E9/L (ref 0–0.2)
BASOPHILS NFR BLD AUTO: 0.1 %
BLD GP AB SCN SERPL QL: NORMAL
BLOOD BANK CMNT PATIENT-IMP: NORMAL
CREAT SERPL-MCNC: 0.61 MG/DL (ref 0.52–1.04)
DIFFERENTIAL METHOD BLD: NORMAL
EOSINOPHIL # BLD AUTO: 0.1 10E9/L (ref 0–0.7)
EOSINOPHIL NFR BLD AUTO: 1.1 %
ERYTHROCYTE [DISTWIDTH] IN BLOOD BY AUTOMATED COUNT: 14 % (ref 10–15)
GFR SERPL CREATININE-BSD FRML MDRD: >90 ML/MIN/{1.73_M2}
HCT VFR BLD AUTO: 36.4 % (ref 35–47)
HGB BLD-MCNC: 11.8 G/DL (ref 11.7–15.7)
IMM GRANULOCYTES # BLD: 0.1 10E9/L (ref 0–0.4)
IMM GRANULOCYTES NFR BLD: 0.8 %
LYMPHOCYTES # BLD AUTO: 1.6 10E9/L (ref 0.8–5.3)
LYMPHOCYTES NFR BLD AUTO: 14.7 %
MCH RBC QN AUTO: 29.2 PG (ref 26.5–33)
MCHC RBC AUTO-ENTMCNC: 32.4 G/DL (ref 31.5–36.5)
MCV RBC AUTO: 90 FL (ref 78–100)
MONOCYTES # BLD AUTO: 0.8 10E9/L (ref 0–1.3)
MONOCYTES NFR BLD AUTO: 7.3 %
NEUTROPHILS # BLD AUTO: 8.2 10E9/L (ref 1.6–8.3)
NEUTROPHILS NFR BLD AUTO: 76 %
NRBC # BLD AUTO: 0 10*3/UL
NRBC BLD AUTO-RTO: 0 /100
PLATELET # BLD AUTO: 196 10E9/L (ref 150–450)
RBC # BLD AUTO: 4.04 10E12/L (ref 3.8–5.2)
SPECIMEN EXP DATE BLD: NORMAL
WBC # BLD AUTO: 10.7 10E9/L (ref 4–11)

## 2020-06-02 PROCEDURE — 59025 FETAL NON-STRESS TEST: CPT | Mod: 26 | Performed by: OBSTETRICS & GYNECOLOGY

## 2020-06-02 PROCEDURE — 86900 BLOOD TYPING SEROLOGIC ABO: CPT | Performed by: STUDENT IN AN ORGANIZED HEALTH CARE EDUCATION/TRAINING PROGRAM

## 2020-06-02 PROCEDURE — 25000132 ZZH RX MED GY IP 250 OP 250 PS 637: Performed by: STUDENT IN AN ORGANIZED HEALTH CARE EDUCATION/TRAINING PROGRAM

## 2020-06-02 PROCEDURE — 85025 COMPLETE CBC W/AUTO DIFF WBC: CPT | Performed by: STUDENT IN AN ORGANIZED HEALTH CARE EDUCATION/TRAINING PROGRAM

## 2020-06-02 PROCEDURE — 86850 RBC ANTIBODY SCREEN: CPT | Performed by: STUDENT IN AN ORGANIZED HEALTH CARE EDUCATION/TRAINING PROGRAM

## 2020-06-02 PROCEDURE — 36415 COLL VENOUS BLD VENIPUNCTURE: CPT | Performed by: STUDENT IN AN ORGANIZED HEALTH CARE EDUCATION/TRAINING PROGRAM

## 2020-06-02 PROCEDURE — 84460 ALANINE AMINO (ALT) (SGPT): CPT | Performed by: STUDENT IN AN ORGANIZED HEALTH CARE EDUCATION/TRAINING PROGRAM

## 2020-06-02 PROCEDURE — 82565 ASSAY OF CREATININE: CPT | Performed by: STUDENT IN AN ORGANIZED HEALTH CARE EDUCATION/TRAINING PROGRAM

## 2020-06-02 PROCEDURE — 25800030 ZZH RX IP 258 OP 636: Performed by: STUDENT IN AN ORGANIZED HEALTH CARE EDUCATION/TRAINING PROGRAM

## 2020-06-02 PROCEDURE — 86901 BLOOD TYPING SEROLOGIC RH(D): CPT | Performed by: STUDENT IN AN ORGANIZED HEALTH CARE EDUCATION/TRAINING PROGRAM

## 2020-06-02 PROCEDURE — 12000001 ZZH R&B MED SURG/OB UMMC

## 2020-06-02 PROCEDURE — 84450 TRANSFERASE (AST) (SGOT): CPT | Performed by: STUDENT IN AN ORGANIZED HEALTH CARE EDUCATION/TRAINING PROGRAM

## 2020-06-02 PROCEDURE — 86780 TREPONEMA PALLIDUM: CPT | Performed by: STUDENT IN AN ORGANIZED HEALTH CARE EDUCATION/TRAINING PROGRAM

## 2020-06-02 PROCEDURE — 59200 INSERT CERVICAL DILATOR: CPT | Mod: GC | Performed by: OBSTETRICS & GYNECOLOGY

## 2020-06-02 RX ORDER — OXYCODONE AND ACETAMINOPHEN 5; 325 MG/1; MG/1
1 TABLET ORAL
Status: DISCONTINUED | OUTPATIENT
Start: 2020-06-02 | End: 2020-06-04

## 2020-06-02 RX ORDER — OXYTOCIN/0.9 % SODIUM CHLORIDE 30/500 ML
100-340 PLASTIC BAG, INJECTION (ML) INTRAVENOUS CONTINUOUS PRN
Status: DISCONTINUED | OUTPATIENT
Start: 2020-06-02 | End: 2020-06-04

## 2020-06-02 RX ORDER — ONDANSETRON 2 MG/ML
4 INJECTION INTRAMUSCULAR; INTRAVENOUS EVERY 6 HOURS PRN
Status: DISCONTINUED | OUTPATIENT
Start: 2020-06-02 | End: 2020-06-04

## 2020-06-02 RX ORDER — IBUPROFEN 800 MG/1
800 TABLET, FILM COATED ORAL
Status: COMPLETED | OUTPATIENT
Start: 2020-06-02 | End: 2020-06-03

## 2020-06-02 RX ORDER — LIDOCAINE 40 MG/G
CREAM TOPICAL
Status: DISCONTINUED | OUTPATIENT
Start: 2020-06-02 | End: 2020-06-04

## 2020-06-02 RX ORDER — CARBOPROST TROMETHAMINE 250 UG/ML
250 INJECTION, SOLUTION INTRAMUSCULAR
Status: DISCONTINUED | OUTPATIENT
Start: 2020-06-02 | End: 2020-06-04

## 2020-06-02 RX ORDER — NALOXONE HYDROCHLORIDE 0.4 MG/ML
.1-.4 INJECTION, SOLUTION INTRAMUSCULAR; INTRAVENOUS; SUBCUTANEOUS
Status: DISCONTINUED | OUTPATIENT
Start: 2020-06-02 | End: 2020-06-04

## 2020-06-02 RX ORDER — ACETAMINOPHEN 325 MG/1
650 TABLET ORAL EVERY 4 HOURS PRN
Status: DISCONTINUED | OUTPATIENT
Start: 2020-06-02 | End: 2020-06-04

## 2020-06-02 RX ORDER — MISOPROSTOL 100 UG/1
25 TABLET ORAL
Status: DISCONTINUED | OUTPATIENT
Start: 2020-06-02 | End: 2020-06-04

## 2020-06-02 RX ORDER — OXYTOCIN 10 [USP'U]/ML
10 INJECTION, SOLUTION INTRAMUSCULAR; INTRAVENOUS
Status: DISCONTINUED | OUTPATIENT
Start: 2020-06-02 | End: 2020-06-04

## 2020-06-02 RX ORDER — METHYLERGONOVINE MALEATE 0.2 MG/ML
200 INJECTION INTRAVENOUS
Status: DISCONTINUED | OUTPATIENT
Start: 2020-06-02 | End: 2020-06-04

## 2020-06-02 RX ORDER — SODIUM CHLORIDE, SODIUM LACTATE, POTASSIUM CHLORIDE, CALCIUM CHLORIDE 600; 310; 30; 20 MG/100ML; MG/100ML; MG/100ML; MG/100ML
INJECTION, SOLUTION INTRAVENOUS CONTINUOUS
Status: DISCONTINUED | OUTPATIENT
Start: 2020-06-02 | End: 2020-06-04

## 2020-06-02 RX ADMIN — Medication 25 MG: at 23:55

## 2020-06-02 RX ADMIN — ACETAMINOPHEN 650 MG: 325 TABLET, FILM COATED ORAL at 19:08

## 2020-06-02 RX ADMIN — Medication 25 MCG: at 20:22

## 2020-06-02 RX ADMIN — Medication 50 MG: at 20:22

## 2020-06-02 RX ADMIN — SODIUM CHLORIDE, POTASSIUM CHLORIDE, SODIUM LACTATE AND CALCIUM CHLORIDE: 600; 310; 30; 20 INJECTION, SOLUTION INTRAVENOUS at 23:32

## 2020-06-02 RX ADMIN — Medication 25 MCG: at 22:20

## 2020-06-02 ASSESSMENT — MIFFLIN-ST. JEOR: SCORE: 1732.87

## 2020-06-02 NOTE — PROGRESS NOTES
Lara bulb placed using speculum for visual guidance and 60cc placed.  Digital exam following removal of speculum confirmed correct placement.  Will also begin po miso.  FHT: cat 1    NATHALIE YEBOAH MD

## 2020-06-02 NOTE — H&P
[facility]  OB History and Physical      Liyah Rodriguez MRN# [id number]   Age: 29 year old YOB: 1990     CC:  Induction of labor    HPI:  Liyah Rodriguez is a 29 year old  at 38w3d by 7w2d US, who presents for scheduled induction of labor for chronic hypertension.  She feels well and has no complaints today. She denies contractions, vaginal bleeding, and loss of fluid. Reports normal fetal movement.    Pregnancy Complications:  1. Chronic hypertension, on ASA  2. Sinus tachycardia, negative workup, on labetalol 50mg BID  3. IUI/letrozole pregnancy    Prenatal Labs:   Lab Results   Component Value Date    ABO A 2020    RH Pos 2020    AS Neg 2020    HEPBANG Nonreactive 2019    CHPCRT  2013     Negative for C. trachomatis rRNA by transcription mediated amplification.   A negative result by transcription mediated amplification does not preclude the   presence of C. trachomatis infection because results are dependent on proper   and adequate collection, absence of inhibitors, and sufficient rRNA to be   detected.    GCPCRT  2013     Negative for N. gonorrhoeae rRNA by transcription mediated amplification.   A negative result by transcription mediated amplification does not preclude the   presence of N. gonorrhoeae infection because results are dependent on proper   and adequate collection, absence of inhibitors, and sufficient rRNA to be   detected.    HGB 11.8 2020       GBS Status:   Lab Results   Component Value Date    GBS Negative 2020       OB History  OB History    Para Term  AB Living   2 0 0 0 1 0   SAB TAB Ectopic Multiple Live Births   1 0 0 0 0      # Outcome Date GA Lbr Ian/2nd Weight Sex Delivery Anes PTL Lv   2 Current            1 SAB 19 9w5d    AB, COMPLETE         Birth Comments: D&C       PMHx:   Past Medical History:   Diagnosis Date     NO ACTIVE PROBLEMS       PSHx:   Past Surgical History:   Procedure Laterality Date     ACL repair  06/2008     DILATION AND CURETTAGE SUCTION N/A 5/24/2019    Procedure: Suction Dilation and Curettage   (9 Weeks and 5 Days);  Surgeon: Coni Barnett MD;  Location: [facility]     ORTHOPEDIC SURGERY Right     knee surgery     Right lateral menisectomy  06/2008     XR HYSTEROSALPINGOGRAM  02/08/2019    normal fill and spill     Meds:  Current Outpatient Medications   Medication Instructions     acetaminophen (TYLENOL) 500-1,000 mg, Oral, EVERY 6 HOURS PRN     aspirin (ASA) 81 mg, Oral, DAILY     Docusate Sodium (COLACE PO) Oral     Doxylamine Succinate, Sleep, (UNISOM PO) 12.5 mg, Oral, AT BEDTIME PRN     ferrous sulfate (FEROSUL) 325 mg, Oral, DAILY WITH BREAKFAST     labetalol (NORMODYNE) 50 mg, Oral, 2 TIMES DAILY     Prenatal Vit-Fe Fumarate-FA (PRENATAL VITAMIN PO) 1 tablet, Oral, DAILY     Allergies:    Allergies   Allergen Reactions     Nkda [No Known Drug Allergies]       FmHx:   Family History   Problem Relation Age of Onset     Heart Disease Paternal Grandmother         CHF     Hypertension Mother      Hypertension Maternal Grandmother      High cholesterol Maternal Grandmother      Cancer Maternal Grandfather         lung cancer     Cancer Father 50        lymphoma     Aneurysm Father 63        spotty vision, occasional weakness prior- then sudden death, smoker     SocHx:  She denies any tobacco, alcohol, or other drug use during this pregnancy.    ROS:   Complete 10-point ROS negative except as noted in HPI.She denies headache, blurry vision, chest pain, shortness of breath, RUQ pain, nausea, vomiting, dysuria, hematuria or extremity edema.    PE:  Vit:   Patient Vitals for the past 4 hrs:   BP Temp Temp src Pulse Resp   06/02/20 2100 (!) 140/93 98.4  F (36.9  C) Oral -- --   06/02/20 2000 (!) 146/83 -- -- 86 18      Gen: Well-appearing, NAD, comfortable  CV: Regular rate, well perfused  Pulm: Breathing room air  comfortably  Abd: Soft, gravid, non-tender  Ext: Trace LE edema b/l  Cx: 50/-3    Pres:  cephalic by BSUS  EFW:  7lb by Leopold's  Memb: intact              FHT: Baseline 130, moderate variability, accelerations present, no decelerations   [place]: 0 contractions in 10 minutes      Assessment:  Liyah Rodriguez is a 29 year old , at 38w3d by 7w2d US, who presents for scheduled induction of labor for chronic HTN.    Plan  Admit to L&D  Labor: Lara balloon placed and inflated to 60cc, will add PO misoprostol for cervical ripening. Plan to transition to pitocing   FWB: Category I FHT.  Continue EFM and toco  Pain: Desires epidural for analgesia  PNC: Rh positive, Rubella immune, GBS negative, GCT 94, placenta anterior  Fen/GI: Regular diet in early labor    CHTN:  - BP normal to mild range, continue to monitor  - HELLP labs normal on admission, UPC 0.18 on     Sinus tachycardia:  - Continue labetalol 50mg BID    The patient was discussed with Dr. Wise who is in agreement with the treatment plan.    Jaylin Ramos MD  Ob/Gyn Resident, PGY-2  20 5:05 PM

## 2020-06-03 ENCOUNTER — ANESTHESIA (OUTPATIENT)
Dept: OBGYN | Facility: CLINIC | Age: 30
End: 2020-06-03
Payer: COMMERCIAL

## 2020-06-03 ENCOUNTER — ANESTHESIA EVENT (OUTPATIENT)
Dept: OBGYN | Facility: CLINIC | Age: 30
End: 2020-06-03
Payer: COMMERCIAL

## 2020-06-03 LAB — T PALLIDUM AB SER QL: NONREACTIVE

## 2020-06-03 PROCEDURE — 0KQM0ZZ REPAIR PERINEUM MUSCLE, OPEN APPROACH: ICD-10-PCS | Performed by: OBSTETRICS & GYNECOLOGY

## 2020-06-03 PROCEDURE — 25000128 H RX IP 250 OP 636

## 2020-06-03 PROCEDURE — 00HU33Z INSERTION OF INFUSION DEVICE INTO SPINAL CANAL, PERCUTANEOUS APPROACH: ICD-10-PCS | Performed by: ANESTHESIOLOGY

## 2020-06-03 PROCEDURE — 40000671 ZZH STATISTIC ANESTHESIA CASE

## 2020-06-03 PROCEDURE — 12000001 ZZH R&B MED SURG/OB UMMC

## 2020-06-03 PROCEDURE — 25000125 ZZHC RX 250

## 2020-06-03 PROCEDURE — 10907ZC DRAINAGE OF AMNIOTIC FLUID, THERAPEUTIC FROM PRODUCTS OF CONCEPTION, VIA NATURAL OR ARTIFICIAL OPENING: ICD-10-PCS | Performed by: OBSTETRICS & GYNECOLOGY

## 2020-06-03 PROCEDURE — 25000132 ZZH RX MED GY IP 250 OP 250 PS 637: Performed by: STUDENT IN AN ORGANIZED HEALTH CARE EDUCATION/TRAINING PROGRAM

## 2020-06-03 PROCEDURE — 25800030 ZZH RX IP 258 OP 636: Performed by: STUDENT IN AN ORGANIZED HEALTH CARE EDUCATION/TRAINING PROGRAM

## 2020-06-03 PROCEDURE — 3E0R3BZ INTRODUCTION OF ANESTHETIC AGENT INTO SPINAL CANAL, PERCUTANEOUS APPROACH: ICD-10-PCS | Performed by: ANESTHESIOLOGY

## 2020-06-03 PROCEDURE — 72200001 ZZH LABOR CARE VAGINAL DELIVERY SINGLE

## 2020-06-03 PROCEDURE — 25000125 ZZHC RX 250: Performed by: STUDENT IN AN ORGANIZED HEALTH CARE EDUCATION/TRAINING PROGRAM

## 2020-06-03 PROCEDURE — 3E033VJ INTRODUCTION OF OTHER HORMONE INTO PERIPHERAL VEIN, PERCUTANEOUS APPROACH: ICD-10-PCS | Performed by: OBSTETRICS & GYNECOLOGY

## 2020-06-03 PROCEDURE — 59400 OBSTETRICAL CARE: CPT | Mod: GC | Performed by: OBSTETRICS & GYNECOLOGY

## 2020-06-03 RX ORDER — NALOXONE HYDROCHLORIDE 0.4 MG/ML
.1-.4 INJECTION, SOLUTION INTRAMUSCULAR; INTRAVENOUS; SUBCUTANEOUS
Status: DISCONTINUED | OUTPATIENT
Start: 2020-06-03 | End: 2020-06-04

## 2020-06-03 RX ORDER — LIDOCAINE HYDROCHLORIDE AND EPINEPHRINE 15; 5 MG/ML; UG/ML
INJECTION, SOLUTION EPIDURAL PRN
Status: DISCONTINUED | OUTPATIENT
Start: 2020-06-03 | End: 2020-06-03

## 2020-06-03 RX ORDER — FENTANYL CITRATE 50 UG/ML
25 INJECTION, SOLUTION INTRAMUSCULAR; INTRAVENOUS ONCE
Status: CANCELLED | OUTPATIENT
Start: 2020-06-03 | End: 2020-06-03

## 2020-06-03 RX ORDER — LIDOCAINE 40 MG/G
CREAM TOPICAL
Status: DISCONTINUED | OUTPATIENT
Start: 2020-06-03 | End: 2020-06-04

## 2020-06-03 RX ORDER — LIDOCAINE HYDROCHLORIDE 10 MG/ML
INJECTION, SOLUTION EPIDURAL; INFILTRATION; INTRACAUDAL; PERINEURAL
Status: COMPLETED
Start: 2020-06-03 | End: 2020-06-03

## 2020-06-03 RX ORDER — NALBUPHINE HYDROCHLORIDE 20 MG/ML
2.5-5 INJECTION, SOLUTION INTRAMUSCULAR; INTRAVENOUS; SUBCUTANEOUS EVERY 6 HOURS PRN
Status: CANCELLED | OUTPATIENT
Start: 2020-06-03

## 2020-06-03 RX ORDER — NALBUPHINE HYDROCHLORIDE 20 MG/ML
2.5-5 INJECTION, SOLUTION INTRAMUSCULAR; INTRAVENOUS; SUBCUTANEOUS EVERY 6 HOURS PRN
Status: DISCONTINUED | OUTPATIENT
Start: 2020-06-03 | End: 2020-06-04

## 2020-06-03 RX ORDER — MISOPROSTOL 200 UG/1
TABLET ORAL
Status: DISCONTINUED
Start: 2020-06-03 | End: 2020-06-03 | Stop reason: HOSPADM

## 2020-06-03 RX ORDER — OXYTOCIN/0.9 % SODIUM CHLORIDE 30/500 ML
PLASTIC BAG, INJECTION (ML) INTRAVENOUS
Status: DISCONTINUED
Start: 2020-06-03 | End: 2020-06-03 | Stop reason: HOSPADM

## 2020-06-03 RX ORDER — NALOXONE HYDROCHLORIDE 0.4 MG/ML
.1-.4 INJECTION, SOLUTION INTRAMUSCULAR; INTRAVENOUS; SUBCUTANEOUS
Status: CANCELLED | OUTPATIENT
Start: 2020-06-03

## 2020-06-03 RX ORDER — OXYTOCIN/0.9 % SODIUM CHLORIDE 30/500 ML
1-24 PLASTIC BAG, INJECTION (ML) INTRAVENOUS CONTINUOUS
Status: DISCONTINUED | OUTPATIENT
Start: 2020-06-03 | End: 2020-06-04

## 2020-06-03 RX ORDER — LIDOCAINE HYDROCHLORIDE 20 MG/ML
INJECTION, SOLUTION EPIDURAL; INFILTRATION; INTRACAUDAL; PERINEURAL PRN
Status: DISCONTINUED | OUTPATIENT
Start: 2020-06-03 | End: 2020-06-03

## 2020-06-03 RX ORDER — EPHEDRINE SULFATE 50 MG/ML
5 INJECTION, SOLUTION INTRAMUSCULAR; INTRAVENOUS; SUBCUTANEOUS
Status: DISCONTINUED | OUTPATIENT
Start: 2020-06-03 | End: 2020-06-04

## 2020-06-03 RX ORDER — OXYTOCIN 10 [USP'U]/ML
INJECTION, SOLUTION INTRAMUSCULAR; INTRAVENOUS
Status: DISCONTINUED
Start: 2020-06-03 | End: 2020-06-03 | Stop reason: HOSPADM

## 2020-06-03 RX ORDER — EPHEDRINE SULFATE 50 MG/ML
5 INJECTION, SOLUTION INTRAMUSCULAR; INTRAVENOUS; SUBCUTANEOUS
Status: CANCELLED | OUTPATIENT
Start: 2020-06-03

## 2020-06-03 RX ADMIN — Medication 25 MCG: at 02:26

## 2020-06-03 RX ADMIN — Medication 2 MILLI-UNITS/MIN: at 09:59

## 2020-06-03 RX ADMIN — Medication 25 MCG: at 04:32

## 2020-06-03 RX ADMIN — Medication 25 MCG: at 00:19

## 2020-06-03 RX ADMIN — LIDOCAINE HYDROCHLORIDE,EPINEPHRINE BITARTRATE 3 ML: 15; .005 INJECTION, SOLUTION EPIDURAL; INFILTRATION; INTRACAUDAL; PERINEURAL at 14:45

## 2020-06-03 RX ADMIN — ACETAMINOPHEN 650 MG: 325 TABLET, FILM COATED ORAL at 05:10

## 2020-06-03 RX ADMIN — LIDOCAINE HYDROCHLORIDE 10 ML: 10 INJECTION, SOLUTION EPIDURAL; INFILTRATION; INTRACAUDAL; PERINEURAL at 21:10

## 2020-06-03 RX ADMIN — Medication 50 MG: at 07:49

## 2020-06-03 RX ADMIN — Medication 4 MILLI-UNITS/MIN: at 10:30

## 2020-06-03 RX ADMIN — SODIUM CHLORIDE, POTASSIUM CHLORIDE, SODIUM LACTATE AND CALCIUM CHLORIDE: 600; 310; 30; 20 INJECTION, SOLUTION INTRAVENOUS at 15:17

## 2020-06-03 RX ADMIN — SODIUM CHLORIDE, POTASSIUM CHLORIDE, SODIUM LACTATE AND CALCIUM CHLORIDE: 600; 310; 30; 20 INJECTION, SOLUTION INTRAVENOUS at 09:59

## 2020-06-03 RX ADMIN — LIDOCAINE HYDROCHLORIDE 2 ML: 20 INJECTION, SOLUTION EPIDURAL; INFILTRATION; INTRACAUDAL; PERINEURAL at 14:47

## 2020-06-03 RX ADMIN — Medication 50 MG: at 20:24

## 2020-06-03 RX ADMIN — Medication: at 14:47

## 2020-06-03 RX ADMIN — IBUPROFEN 800 MG: 800 TABLET, FILM COATED ORAL at 22:07

## 2020-06-03 RX ADMIN — SODIUM CHLORIDE, POTASSIUM CHLORIDE, SODIUM LACTATE AND CALCIUM CHLORIDE 1000 ML: 600; 310; 30; 20 INJECTION, SOLUTION INTRAVENOUS at 14:10

## 2020-06-03 NOTE — PROGRESS NOTES
IUPC stoppd working. Removed. Cervical exam 7/90/-2. FHT Category I with 140 bpm, accels, no decels. Fate 4 ctx/10 min. IUPC replaced.   Patient comfortable with epidural. Plan to recheck prn or in 2-3hours    BP have been mild range. Single severe range around time epidural given.     Yvonne Hernandez MD  Obstetrics & Gynecology, PGY-2  6/3/2020 5:07 PM

## 2020-06-03 NOTE — PROGRESS NOTES
[facility]  FHT Review Note    Note entry delayed due to patient care    FHT: Baseline 130, moderate variability, accelerations present, no decelerations  [place]: 0 contractions in 10 minutes, background uterine irritability    Assessment/Plan:  Liyah Rodriguez is a 29 year old  at 38w4d by 7w2d US, here for induction of labor for chronic HTN.    Labor:  - Lara balloon in place with 60cc, continue PO misoprostol for cervical ripening. Plan to recheck SVE once Lara displaced  - Pain management: Desires epidural for analgesia, currently comfortable    CHTN:  - BP normal to mild range, continue to monitor  - HELLP labs normal on admission, UPC 0.18 on     Sinus tachycardia:  - Continue labetalol 50mg BID    Fetal well-being:  - Category I FHT. Reactive and reassuring  - Cephalic by BSUS. EFW 7lb  - Continue EFM and [place]    Jaylin Ramos MD  Ob/Gyn Resident, PGY-2  20 4:46 AM

## 2020-06-03 NOTE — ANESTHESIA PROCEDURE NOTES
Epidural Procedure Note  Staff -   Anesthesiologist:  Ana Faulkner MD  Resident/Fellow: Robert Olmstead MD    Performed By: anesthesiologist and with residents  Procedure performed by resident/CRNA in presence of a teaching physician.          Date/Time: 6/3/2020 2:53 PM    Location: OB     Procedure start time:  6/3/2020 2:37 PM     Procedure end time:  6/3/2020 2:26 PM   Pre-procedure checklist:   patient identified, IV checked, site marked, risks and benefits discussed, informed consent, monitors and equipment checked, pre-op evaluation, at physician/surgeon's request and post-op pain management      Correct Patient: Yes      Correct Position: Yes      Correct Site: Yes      Correct Procedure: Yes      Correct Laterality:  Yes    Site Marked:  Yes  Procedure:     Procedure:  Epidural catheter    ASA:  2    Diagnosis:  Labor analgesia    Position:  Sitting    Sterile Prep: chloraprep      Insertion site:  L3-4    Local skin infiltration:  1% lidocaine    amount (mL):  2    Approach:  Midline    Needle gauge (G):  17    Needle Length (in):  3.5    Block Needle Type:  Touhy    Injection Technique:  LORT saline    ANDREW at (cm):  8    Attempts:  2    Redirects:  2    Catheter gauge (G):  19    Catheter threaded easily: Yes      Threaded to cm at skin:  13    Threaded in epidural space (cm):  5    Paresthesias:  No    Aspiration negative for Heme or CSF: Yes       Local anesthetic:  Lidocaine 1.5% w/ 1:200,000 epinephrine    Test dose time:  14:45

## 2020-06-03 NOTE — PLAN OF CARE
[facility] for CHTN. Pt and spouse, Claudio, were oriented to room and plan of care was discussed. Induction to begin with kyle bulb and po misoprostil. Saline lock placed. Pt does plan on epidural in the future and will alert staff when she feels ready. Labetalol 50mg bid for HTN. Stable.

## 2020-06-03 NOTE — ANESTHESIA PREPROCEDURE EVALUATION
"Anesthesia Pre-Procedure Evaluation    Patient: Liyah Rodriguez   MRN:     [id number] Gender:   female   Age:    29 year old :      1990        Preoperative Diagnosis: * No surgery found *        LABS:  CBC:   Lab Results   Component Value Date    WBC 10.7 2020    WBC 10.7 2020    HGB 11.8 2020    HGB 12.2 2020    HCT 36.4 2020    HCT 37.0 2020     2020     2020     BMP:   Lab Results   Component Value Date     2019     2019    POTASSIUM 3.9 2019    POTASSIUM 3.4 2019    CHLORIDE 108 2019    CHLORIDE 107 2019    CO2 20 2019    CO2 23 2019    BUN 12 2019    BUN 11 2019    CR 0.61 2020    CR 0.58 2020     (H) 2019    GLC 75 2019     COAGS: No results found for: PTT, INR, FIBR  POC:   Lab Results   Component Value Date    BGM 95 2019    HCG Positive (A) 2019     OTHER:   Lab Results   Component Value Date    JACKIE 8.8 2019    ALBUMIN 3.6 2019    PROTTOTAL 7.2 2019    ALT 29 2020    AST 18 2020    ALKPHOS 60 2019    BILITOTAL 0.3 2019    TSH 2.58 2020        Preop Vitals    BP Readings from Last 3 Encounters:   20 138/80   20 (!) 141/78   20 133/86    Pulse Readings from Last 3 Encounters:   20 101   20 108   20 86      Resp Readings from Last 3 Encounters:   20 16   20 16   19 16    SpO2 Readings from Last 3 Encounters:   20 98%   19 98%   19 97%      Temp Readings from Last 1 Encounters:   20 36.6  C (97.8  F) (Oral)    Ht Readings from Last 1 Encounters:   20 1.6 m (5' 3\")      Wt Readings from Last 1 Encounters:   20 103.9 kg (229 lb)    Estimated body mass index is 40.57 kg/m  as calculated from the following:    Height as of this encounter: 1.6 m (5' 3\").    Weight as of this " encounter: 103.9 kg (229 lb).     LDA:  Peripheral IV 20 Left Hand (Active)   Site Assessment WDL 20 1030   Line Status Infusing 20 1030   Phlebitis Scale 0-->no symptoms 20 1030   Infiltration Scale 0 20 1030   Number of days: 1        Past Medical History:   Diagnosis Date     NO ACTIVE PROBLEMS       Past Surgical History:   Procedure Laterality Date     ACL repair  2008     DILATION AND CURETTAGE SUCTION N/A 2019    Procedure: Suction Dilation and Curettage   (9 Weeks and 5 Days);  Surgeon: Coni Barnett MD;  Location: [facility]     ORTHOPEDIC SURGERY Right     knee surgery     Right lateral menisectomy  2008     XR HYSTEROSALPINGOGRAM  2019    normal fill and spill      Allergies   Allergen Reactions     Nkda [No Known Drug Allergies]         Anesthesia Evaluation     . Pt has had prior anesthetic.     No history of anesthetic complications          ROS/MED HX    ENT/Pulmonary: Comment: COVID negative      Neurologic:  - neg neurologic ROS     Cardiovascular: Comment: Hx of chronic HTN  - HELLP labs found to be within normal limits    (+) hypertension----. : . . . :. .       METS/Exercise Tolerance:     Hematologic:  - neg hematologic  ROS       Musculoskeletal:  - neg musculoskeletal ROS       GI/Hepatic:     (+) GERD       Renal/Genitourinary:  - ROS Renal section negative       Endo:  - neg endo ROS       Psychiatric:  - neg psychiatric ROS       Infectious Disease:  - neg infectious disease ROS       Malignancy:         Other: Comment: 29 year old  at 38w4d     - Plan for epidural catheter                    JZG FV AN PHYSICAL EXAM    Assessment:   ASA SCORE: 2 emergent           PONV Management: Adult Risk Factors: Female                   Robert Olmstead MD

## 2020-06-03 NOTE — PLAN OF CARE
Patient resting overnight. Accompanied by . VSS, afebrile. Endorsed mild cramping, requested tylenol and hotpacks, which she stated helped. FHR Category 1, uterine activity showed irritability with occasional contractions. Patient endorses +FM. Lara bulb checked by RN without movement. Continue with plan of care.

## 2020-06-03 NOTE — PROGRESS NOTES
[facility]  FHT Review Note    FHT: Baseline 150, moderate variability,accelerations present, no decelerations; periods of discontinuity  [place]: 0 contractions in 10 minutes    Assessment/Plan:  Liyah Rodriguez is a 29 year old  at 38w3d by 7w2d US, here for induction of labor for chronic HTN.    Labor:  - Lara balloon in place with 60cc, continue PO misoprostol for cervical ripening. Plan to recheck SVE once Lara displaced  - Pain management: Desires epidural for analgesia, currently comfortable    CHTN:  - BP normal to mild range, continue to monitor  - HELLP labs normal on admission, UPC 0.18 on     Sinus tachycardia:  - Continue labetalol 50mg BID    Fetal well-being:  - Category I FHT. Reactive and reassuring  - Cephalic by BSUS. EFW 7lb  - Continue EFM and [place]    Jaylin Ramos MD  Ob/Gyn Resident, PGY-2  20 10:35 PM

## 2020-06-03 NOTE — PLAN OF CARE
AROM per Dr Reese at 1315 while pt on 10 mu/min of pitocin. Pt immediately went into labor after AROM and requested epidural. Pt now comfortable with epidural in place. Pitocin titrated down to 2 mu/min. Category 1 tracing. Occasional severe range B/P's, Dr Hernandez notified. O2 sats occasionally drop to 90 when pt asleep. Will start nasal canula if needed. Bedside report off to LATONIA Shah

## 2020-06-03 NOTE — PROGRESS NOTES
Subjective:   Contractions: minimal, more cramping last night  Leakage of fluids: no        Objective:  Patient Vitals for the past 8 hrs:   BP Temp Temp src Pulse Resp   20 1300 138/80 97.8  F (36.6  C) Oral -- --   20 1230 (!) 149/94 -- -- -- --   20 1215 95/56 -- Oral -- --   20 1030 -- 98.2  F (36.8  C) Oral -- 16   20 1000 132/79 -- -- -- --   20 0957 132/79 -- -- 101 --   20 0749 (!) 140/94 98.1  F (36.7  C) Oral -- --     Cervix: 5-6/70/-2  Membranes: AROM  clear amniotic fluid    EFM:   130 baseline, moderate variablility, + accels, no decels, Category I  Tocometer: IUPC, frequency q 2-3 minutes and Pitocin @ 10 mU    Assessment:/Plan:   Discussed epidural when ready, anticipate DAVID FRANKLIN MD

## 2020-06-03 NOTE — PROGRESS NOTES
Subjective:   Contractions: not feeling anything  Leakage of fluids: no        Objective:  Patient Vitals for the past 8 hrs:   BP Temp Temp src Pulse Resp   06/03/20 0957 132/79 -- -- 101 --   06/03/20 0749 (!) 140/94 98.1  F (36.7  C) Oral -- --   06/03/20 0300 103/62 -- -- -- 18     Cervix: not checked, kyle is out now  Membranes: intact    EFM:   150 baseline, moderate variablility, + accels, no decels, Category I  Tocometer: external monitor and irregular    Assessment:/Plan:   Labor: induced with cytotec for 5 doses and kyle bulb  Will start pitocin now and when has a good patter plan to AROM. Discussed epidural when desires. No questions. GBS neg.    JYOTI FRANKLIN MD

## 2020-06-04 LAB — HGB BLD-MCNC: 10.5 G/DL (ref 11.7–15.7)

## 2020-06-04 PROCEDURE — 85018 HEMOGLOBIN: CPT | Performed by: STUDENT IN AN ORGANIZED HEALTH CARE EDUCATION/TRAINING PROGRAM

## 2020-06-04 PROCEDURE — 36415 COLL VENOUS BLD VENIPUNCTURE: CPT | Performed by: STUDENT IN AN ORGANIZED HEALTH CARE EDUCATION/TRAINING PROGRAM

## 2020-06-04 PROCEDURE — 12000001 ZZH R&B MED SURG/OB UMMC

## 2020-06-04 PROCEDURE — 25000132 ZZH RX MED GY IP 250 OP 250 PS 637: Performed by: STUDENT IN AN ORGANIZED HEALTH CARE EDUCATION/TRAINING PROGRAM

## 2020-06-04 RX ORDER — IBUPROFEN 800 MG/1
800 TABLET, FILM COATED ORAL EVERY 6 HOURS PRN
Status: DISCONTINUED | OUTPATIENT
Start: 2020-06-04 | End: 2020-06-05 | Stop reason: HOSPADM

## 2020-06-04 RX ORDER — AMOXICILLIN 250 MG
2 CAPSULE ORAL 2 TIMES DAILY
Status: DISCONTINUED | OUTPATIENT
Start: 2020-06-04 | End: 2020-06-05 | Stop reason: HOSPADM

## 2020-06-04 RX ORDER — NALOXONE HYDROCHLORIDE 0.4 MG/ML
.1-.4 INJECTION, SOLUTION INTRAMUSCULAR; INTRAVENOUS; SUBCUTANEOUS
Status: DISCONTINUED | OUTPATIENT
Start: 2020-06-04 | End: 2020-06-05 | Stop reason: HOSPADM

## 2020-06-04 RX ORDER — ACETAMINOPHEN 325 MG/1
650 TABLET ORAL EVERY 6 HOURS PRN
Qty: 100 TABLET | Refills: 0 | Status: SHIPPED | OUTPATIENT
Start: 2020-06-04 | End: 2023-05-02

## 2020-06-04 RX ORDER — OXYTOCIN/0.9 % SODIUM CHLORIDE 30/500 ML
340 PLASTIC BAG, INJECTION (ML) INTRAVENOUS CONTINUOUS PRN
Status: DISCONTINUED | OUTPATIENT
Start: 2020-06-04 | End: 2020-06-05 | Stop reason: HOSPADM

## 2020-06-04 RX ORDER — AMOXICILLIN 250 MG
1 CAPSULE ORAL DAILY
Qty: 100 TABLET | Refills: 0 | Status: SHIPPED | OUTPATIENT
Start: 2020-06-04 | End: 2021-09-24

## 2020-06-04 RX ORDER — BISACODYL 10 MG
10 SUPPOSITORY, RECTAL RECTAL DAILY PRN
Status: DISCONTINUED | OUTPATIENT
Start: 2020-06-05 | End: 2020-06-05 | Stop reason: HOSPADM

## 2020-06-04 RX ORDER — IBUPROFEN 600 MG/1
600 TABLET, FILM COATED ORAL EVERY 6 HOURS PRN
Qty: 60 TABLET | Refills: 0 | Status: SHIPPED | OUTPATIENT
Start: 2020-06-04 | End: 2023-05-02

## 2020-06-04 RX ORDER — ACETAMINOPHEN 325 MG/1
650 TABLET ORAL EVERY 4 HOURS PRN
Status: DISCONTINUED | OUTPATIENT
Start: 2020-06-04 | End: 2020-06-05 | Stop reason: HOSPADM

## 2020-06-04 RX ORDER — HYDROCORTISONE 2.5 %
CREAM (GRAM) TOPICAL 3 TIMES DAILY PRN
Status: DISCONTINUED | OUTPATIENT
Start: 2020-06-04 | End: 2020-06-05 | Stop reason: HOSPADM

## 2020-06-04 RX ORDER — OXYTOCIN/0.9 % SODIUM CHLORIDE 30/500 ML
100 PLASTIC BAG, INJECTION (ML) INTRAVENOUS CONTINUOUS
Status: DISCONTINUED | OUTPATIENT
Start: 2020-06-04 | End: 2020-06-05 | Stop reason: HOSPADM

## 2020-06-04 RX ORDER — MODIFIED LANOLIN
OINTMENT (GRAM) TOPICAL
Status: DISCONTINUED | OUTPATIENT
Start: 2020-06-04 | End: 2020-06-05 | Stop reason: HOSPADM

## 2020-06-04 RX ORDER — OXYTOCIN 10 [USP'U]/ML
10 INJECTION, SOLUTION INTRAMUSCULAR; INTRAVENOUS
Status: DISCONTINUED | OUTPATIENT
Start: 2020-06-04 | End: 2020-06-05 | Stop reason: HOSPADM

## 2020-06-04 RX ORDER — AMOXICILLIN 250 MG
1 CAPSULE ORAL 2 TIMES DAILY
Status: DISCONTINUED | OUTPATIENT
Start: 2020-06-04 | End: 2020-06-05 | Stop reason: HOSPADM

## 2020-06-04 RX ADMIN — ACETAMINOPHEN 650 MG: 325 TABLET, FILM COATED ORAL at 19:15

## 2020-06-04 RX ADMIN — ACETAMINOPHEN 650 MG: 325 TABLET, FILM COATED ORAL at 10:36

## 2020-06-04 RX ADMIN — ACETAMINOPHEN 650 MG: 325 TABLET, FILM COATED ORAL at 01:50

## 2020-06-04 RX ADMIN — IBUPROFEN 800 MG: 800 TABLET, FILM COATED ORAL at 10:36

## 2020-06-04 RX ADMIN — DOCUSATE SODIUM AND SENNOSIDES 1 TABLET: 8.6; 5 TABLET, FILM COATED ORAL at 19:15

## 2020-06-04 RX ADMIN — ACETAMINOPHEN 650 MG: 325 TABLET, FILM COATED ORAL at 14:29

## 2020-06-04 RX ADMIN — ACETAMINOPHEN 650 MG: 325 TABLET, FILM COATED ORAL at 06:40

## 2020-06-04 RX ADMIN — DOCUSATE SODIUM AND SENNOSIDES 1 TABLET: 8.6; 5 TABLET, FILM COATED ORAL at 10:36

## 2020-06-04 RX ADMIN — IBUPROFEN 800 MG: 800 TABLET, FILM COATED ORAL at 04:53

## 2020-06-04 RX ADMIN — IBUPROFEN 800 MG: 800 TABLET, FILM COATED ORAL at 17:42

## 2020-06-04 NOTE — PROGRESS NOTES
Post Partum Progress Note  PPD#1    Subjective:  She is resting comfortably in bed this morning. She complains of feeling fatigued. Having minimal abdominal cramping. She is tolerating PO intake. Lochia present and similar to period.  She is voiding without difficulty. She is ambulating without dizziness or difficulty.  She denies headache, changes in vision, nausea/vomiting, chest pain, shortness of breath, RUQ pain, or worsening edema.  Plans to breast feed which she is working on.    Objective:  Vitals:    20 2220 20 2235 20 2250 20 0300   BP: 133/71 123/79 128/76 123/80   Pulse:    101   Resp: 16 16 16 16   Temp:   98.5  F (36.9  C) 98  F (36.7  C)   TempSrc:   Oral Oral   SpO2: 98% 98% 98% 98%   Weight:       Height:           General: NAD, resting comfortably  CV: Regular rate, well perfused.   Pulm: Normal respiratory effort.  Abd: Soft, non-tender, non-distended. Fundus is firm and at the umbilicus.    Ext: 1+ lower extremity edema bilaterally. No calf tenderness.    Assessment/Plan:  Liyah Rodriguez is a 29 year old  female who is  PPD#1 s/p .    - Encourage routine post-operative goals including ambulation and incentive spirometry  - PNC: Rh pos. Rubella immune. No intervention indicated.  - Pain: controlled on oral medications  - Heme: Hgb 11.8> >AM Hgb pending.  If <10 will discharge home with iron.  - GI: continue anti-emetics and stool softeners as needed.  - : Required straight catheterization last night. Has not yet voided. Will try again soon.  - Infant: Stable in room  - Feeding: Plans on breastfeeding.  - BC: TBD    CHTN  - HELLP wnl  - BP mostly mild range. Had a nonsustained severe range near epidural placement. No medications indicated at this time. No preeclampsia symptoms. Continue to monitor.     Discharge to home on PPD#2      Yvonne Hernandez MD, MD  Obstetrics and Gyncology, PGY-2  2020 , 6:20 AM       Physician  Attestation   I, Mari Garcia MD, personally examined and evaluated this patient.  I discussed the patient with the resident/fellow and care team, and agree with the assessment and plan of care as documented in the note of 20.      I personally reviewed vital signs, medications, labs and exam.    Key findings: 29 year old  on PPD 1 s/p . Doing well. BPs normotensive. Continue routine advancements. Anticipate discharge to home tomorrow.   Mari Garcia MD  Date of Service (when I saw the patient): 20

## 2020-06-04 NOTE — PLAN OF CARE
VSS. BP stable on shift. Denies pre-eclampsia symptoms. Fundus firm, midline at U/U. Lochia rubra and light. Unable to void after 6 hours. Bladder scanned for 400 mL, pt straight cathed for 425 mL and tolerated although more pain in perneium from stitches and catheter. Ice and tucks pad in use and pain tolerated with tylenol and ibuprofen. Pt due to void at 0700. Tolerating regular diet, passing gas. Up ad terence.   Breastfeeding with minimal assist. Educated on football hold, obtaining deeper latch and hand expression. Bonding well with infant. Continue current plan of care.

## 2020-06-04 NOTE — DISCHARGE SUMMARY
[facility] Discharge Summary    Liyah Rodriguez MRN# [id number]   Age: 29 year old YOB: 1990     Date of Admission:  2020  Date of Discharge:  2020  Admitting Physician:  Roz Wise MD  Discharge Physician:  Coni Barnett MD    Admit Dx:   - Intrauterine pregnancy at 38w4d   - chronic hypertension  - sinus tachycardia  - IUI letrozole pregnancy    Discharge Dx:  - Same as above, s/p       Procedures:  - Spontaneous vaginal delivery  - Epidural analgesia    Admit HPI/Labor Course:  Liyah Rodriguez is a 29 year old now  who presented at 38w4d for induction of labor for chronic hypertension.  Pregnancy was notable for sinus tachycardia. Her IOL began with misoprostol and a kyle balloon. She was augmented with pitocin and AROM.  Labor course was uncomplicated.  She progressed to complete and pushed for about 1 hour, after which she had a spontaneous vaginal delivery of a viable female infant in KAREN position.  No nuchal cord was noted.  Apgars of 9 and 9 with weight of 3010 grams.  The cord was double clamped after 60 seconds and cut.  A cord segment and cord blood were obtained.  30U of IV pitocin was started. The placenta was then delivered using gentle traction and suprapubic pressure.  The uterus was noted to be firm after fundal massage.  The perineum was assessed for lacerations and second degree were noted.  The laceration was repaired in standard fashion using 3-0 Vicryl suture.  On final examination of the perineum, the repair was noted to be hemostatic.  Total EBL was 350.  The placenta appeared intact with a 3V umbilical cord.  Dr. Reese was present for the entire procedure.     Please see her Admission H&P and Delivery Summary for further details.    Postpartum Course:  Her postpartum course was complicated by urinary retention, which resolved after one straight catherterization. On PPD#2, she was meeting  all of her postpartum goals and deemed stable for discharge. She was voiding without difficulty, tolerating a regular diet without nausea and vomiting, her pain was well controlled on oral pain medicines and her lochia was appropriate. Her hemoglobin prior to delivery was 11.8 and after delivery was 10.5. Her Rh status was positive, and Rhogam was not indicated.     Discharge Medications:     Review of your medicines      START taking      Dose / Directions   ibuprofen 600 MG tablet  Commonly known as:  ADVIL/MOTRIN  Used for:   (normal spontaneous vaginal delivery)      Dose:  600 mg  Take 1 tablet (600 mg) by mouth every 6 hours as needed for moderate pain Start after delivery  Quantity:  60 tablet  Refills:  0     senna-docusate 8.6-50 MG tablet  Commonly known as:  SENOKOT-S/PERICOLACE  Used for:   (normal spontaneous vaginal delivery)      Dose:  1 tablet  Take 1 tablet by mouth daily Start after delivery.  Quantity:  100 tablet  Refills:  0        CONTINUE these medicines which may have CHANGED, or have new prescriptions. If we are uncertain of the size of tablets/capsules you have at home, strength may be listed as something that might have changed.      Dose / Directions   acetaminophen 325 MG tablet  Commonly known as:  TYLENOL  This may have changed:      medication strength    how much to take    additional instructions  Used for:   (normal spontaneous vaginal delivery)      Dose:  650 mg  Take 2 tablets (650 mg) by mouth every 6 hours as needed for mild pain Start after Delivery.  Quantity:  100 tablet  Refills:  0        CONTINUE these medicines which have NOT CHANGED      Dose / Directions   ferrous sulfate 325 (65 Fe) MG tablet  Commonly known as:  FEROSUL      Dose:  325 mg  Take 325 mg by mouth daily (with breakfast)  Refills:  0     labetalol 100 MG tablet  Commonly known as:  NORMODYNE  Used for:  Tachycardia, Essential hypertension, benign      Dose:  50 mg  Take 0.5 tablets (50 mg)  by mouth 2 times daily  Quantity:  180 tablet  Refills:  3     PRENATAL VITAMIN PO      Dose:  1 tablet  Take 1 tablet by mouth daily  Refills:  0     UNISOM PO      Dose:  12.5 mg  Take 12.5 mg by mouth nightly as needed  Refills:  0        STOP taking    aspirin 81 MG chewable tablet  Commonly known as:  ASA        COLACE PO              Where to get your medicines      These medications were sent to [facility] [address]  [address] [facility] [address], [facility] [address]    Phone:  [phone]     acetaminophen 325 MG tablet    ibuprofen 600 MG tablet    senna-docusate 8.6-50 MG tablet       Discharge/Disposition:  Liyah Rodriguez was discharged to home in stable condition with the following instructions/medications:  1) Call for temperature > 100.4, bright red vaginal bleeding >1 pad an hour x 2 hours, foul smelling vaginal discharge, pain not controlled by usual oral pain meds, persistent nausea and vomiting not controlled on medications  2) She was undecided for contraception.  3) For feeding she decided to breast feed.  4) She was instructed to follow-up with her primary OB in 6 weeks for a routine postpartum visit.    Mirian Richard MD  PGY-4 OB/GYN  [phone] (OB G3 Pager)  06/05/2020   11:22 PM       Physician Attestation   I, Coni Barnett, have seen and examined this patient.  I have reviewed the above note and agree with discharge plan as documented in the above note.     Coni Barnett MD  Date of Service (when I saw the patient): 06/05/20

## 2020-06-04 NOTE — LACTATION NOTE
This note was copied from a baby's chart.  Consult for: First baby, breastfeeding well check in on latch.    History:  Vaginal delivery @ 38w4d, AGA infant @ 6# 10.2 oz. birthweight, less than 24 hours of age.  Maternal history of chronic HTN managed with Labetalol 100 mg BID, obesity, infertility with medicated conception end in 9 week loss, then this pregnancy IUI with Letrozole.     Breast exam of mom: Soft, widely spaced breasts with right slightly larger than left with intact, everted nipples bilaterally; bruise on upper, right areola and tiny blister on left nipple. Liyah denies any breast changes during pregnancy, dad feels there was very slight breast growth but not much.     Oral exam of baby: Feels WNL, organized suck on finger.     Feeding assessment:  Mom latches independently but somewhat shallow. With permission, hands on demo tips how to get deeper latch, able to point out difference with more areola in mouth and increased comfort.    Education provided: Discussed positioning with good support, anatomy of breast and infant mouth, ways to get and maintain deeper latch, breast compressions to enhance milk transfer prn, benefits of skin to skin and feeding on cue, supply and demand with importance of early days and weeks to establish best supply, benefits of and how to do breast massage & hand expression, encouraged hands on pumping 3 or more times daily for the first week until follow up with LC outpatient. Reviewed how to tell if getting enough, what to expect in the coming days and preventing engorgement, feeding log with when and who to call if concerns and breastfeeding resource list adding in [place].    Plan: Please encourage frequent skin to skin, breastfeed on cue with goal of 8 to 12 feedings in 24 hours & continued hand expressing after each feeding until milk is in and hands on pumping 3 or more times daily to help bring in full milk supply. Follow up with outpatient lactation  consultant within a week of discharge for support with first time breastfeeding, mom with risks for low supply (HTN, infertility, no breast changes in pregnancy, wider spaced breasts, obesity).

## 2020-06-04 NOTE — PLAN OF CARE
Pt up in room & ambulating independently. Pt able to void 600mL this a.m., continues to void spontaneously w/o difficulty. Taking Ibuprofen & tylenol for perineal discomfort, also using ice & tucks. Breastfeeding with minimal staff assist.  here & supportive. Bonding very well with baby.

## 2020-06-04 NOTE — PROVIDER NOTIFICATION
06/04/20 1053   Provider Notification   Provider Name/Title [facility] G[facility]   Method of Notification Electronic Page   Notification Reason Vital Signs Change  (BPs this a.m. 141/74 & 142/70. No other symptoms of HTN)

## 2020-06-04 NOTE — PLAN OF CARE
VSS and postpartum assessments WDL.  Fundus firm at U/U. Scant lochia. Up and ambulating with steady gait.  Independent with cares. Bonding well with infant. Breastfeeding on cue with minimal assistance. Voiding without difficulty. Pain managed with tylenol and Ibuprofen. Partner present and supportive. Will continue with postpartum cares and education per plan of care.

## 2020-06-04 NOTE — PLAN OF CARE
Vaginal Delivery Note   of viable Female with Dr. Reese, Dr. Hernandez, Juliana Steel, RN in attendance.  NICU/Nursery RN Britt Joseph present.  Infant with spontaneous cry, to mother's abdomen, dried and stimulated.  APGAR at 1 minute:  9 and APGAR at 5 minutes:  9.  Placenta delivered with out complication, pitocin infused per order set, 2nd degree laceration, with repair, surendra cares provided.  Mother and baby in stable condition.

## 2020-06-04 NOTE — PLAN OF CARE
Data: Liyah Rodriguez transferred to 7120 via wheelchair at 2245. Baby transferred via parent's arms.  Action: Receiving unit notified of transfer: Yes. Patient and family notified of room change. Report given to LATONIA Stein at 2315. Belongings sent to receiving unit. Accompanied by Registered Nurse. Oriented patient to surroundings. Call light within reach. ID bands double-checked with receiving RN.  Response: Patient tolerated transfer and is stable.

## 2020-06-04 NOTE — PROGRESS NOTES
Patient arrived to [facility] unit via wheelchair at 2330,with belongings, accompanied by spouse/ significant other, with infant in arms. Received report from Pablo and checked bands. Unit and room orientation completd. Call light given; no concerns present at this time. Continue with plan of care.

## 2020-06-04 NOTE — L&D DELIVERY NOTE
OB Vaginal Delivery Note    Liyah Rodriguez MRN# [id number]   Age: 29 year old YOB: 1990       L&D Delivery Note:     Liyah Rodriguez is a 29 year old now  who presented at 38w4d for induction of labor for chronic hypertension.  Pregnancy was notable for sinus tachycardia treated with labret and stable.  Her IOL began with misoprostol and a kyle balloon. She was augmented with pitocin and AROM.  Labor course was uncomplicated.  She progressed to complete and pushed for about 1 hour, after which she had a spontaneous vaginal delivery of a viable female infant in KAREN position.  No nuchal cord was noted.  Apgars of 9 and 9 with weight of 3010 grams.  The cord was double clamped after 60 seconds and cut.  A cord segment and cord blood were obtained.  30U of IV pitocin was started. The placenta was then delivered using gentle traction and suprapubic pressure.  The uterus was noted to be firm after fundal massage.  The perineum was assessed for lacerations and second degree were noted.  The laceration was repaired in standard fashion using 3-0 Vicryl suture.  On final examination of the perineum, the repair was noted to be hemostatic.  Total EBL was 350.  The placenta appeared intact with a 3V umbilical cord.  Dr. Reese was present for the entire procedure.     Yvonne Hernandez MD  Obstetrics & Gynecology, PGY-2  6/3/2020 9:09 PM    I was present for delivery of viable female infant, placenta and laceration repair. No complications. Stable    Yvonne Reese MD        GA: 38w4d  GP:   Labor Complications: Preeclampsia/Hypertension   EBL:   mL  Delivery QBL:    Delivery Type: Vaginal, Spontaneous   ROM to Delivery Time: (Delivered) Hours: 7 Minutes: 29   Weight:     1 Minute 5 Minute 10 Minute   Apgar Totals: 9   9        YVONNE REESE;YVONNE HERNANDEZ;DAVID VEGA;OLGA COLLAZO     Delivery Details:  Liyah Rodriguez, a 29  year old  female delivered a viable infant with apgars of 9  and 9 . Patient was fully dilated and pushing after 7  hours 0  minutes in active labor. Delivery was via vaginal, spontaneous  to a sterile field under epidural  anesthesia. Infant delivered in vertex  left  occiput  anterior  position. Anterior and posterior shoulders delivered without difficulty. The cord was clamped, cut twice and 3 vessels  were noted. Cord blood was obtained in routine fashion with the following disposition: lab .      Cord complications: none   Placenta delivered at 6/3/2020  8:48 PM . Placental disposition was Hospital disposal . Fundal massage performed and fundus found to be firm.     Episiotomy: none    Perineum, vagina, cervix were inspected, and the following lacerations were noted:   Perineal lacerations: 2nd                Any lacerations were repaired in the usual fashion using 3-0 Vicryl.    Excellent hemostasis was noted. Needle count correct. Infant and patient in delivery room in good and stable condition.        Labor Event Times    Labor onset date:  6/3/20 Onset time:   1:00 PM   Dilation complete date:  6/3/20 Complete time:   8:00 PM   Start pushing date/time:  6/3/2020 1957      Labor Length    1st Stage (hrs):  7 (min):  0   2nd Stage (hrs):  0 (min):  44   3rd Stage (hrs):  0 (min):  4      Labor Events     labor?:  No   steroids:  None  Labor Type:  Induction/Cervical ripening, AROM  Predominate monitoring during 1st stage:  continuous electronic fetal monitoring     Antibiotics received during labor?:  No     Rupture date/time: 6/3/20 1315   Rupture type:  Artificial Rupture of Membranes  Fluid color:  Clear  Fluid odor:  Normal     Induction:  Misoprostol, Mechanical ripening agent  Induction date/time: 20 1700   Cervical ripening date/time:     Indications for induction:  Hypertension     1:1 continuous labor support provided by?:  RN Labor partogram used?:  no       Delivery/Placenta Date and Time    Delivery Date:  6/3/20 Delivery Time:   8:44 PM   Placenta Date/Time:  6/3/2020  8:48 PM  Oxytocin given at the time of delivery:  after delivery of baby     Vaginal Counts     Initial count performed by 2 team members:   Two Team Members   Dr. Hugh Steel, RN       Needles Suture [employer] Sponges Instruments   Initial counts 2  5    Added to count  1     Final counts 2 1 5    Placed during labor Accounted for at the end of labor   NA NA   Yes Yes   NA NA    Final count performed by 2 team members:   Two Team Members   Dr. Hugh Steel, RN      Final count correct?:  Yes     Apgars    Living status:  Living   1 Minute 5 Minute 10 Minute 15 Minute 20 Minute   Skin color: 1  1       Heart rate: 2  2       Reflex irritability: 2  2       Muscle tone: 2  2       Respiratory effort: 2  2       Total: 9  9       Apgars assigned by:  BELINDA COLLAZO RN     Cord    Vessels:  3 Vessels Complications:  None   Cord Blood Disposition:  Lab Gases Sent?:  No      Skin to Skin and Feeding Plan    Skin to skin initiation date/time: 1/6/1841    Skin to skin with:  Mother  Skin to skin end date/time:     How do you plan to feed your baby:  Breastfeeding     Labor Events and Shoulder Dystocia    Fetal Tracing Prior to Delivery:  Category 2  Shoulder dystocia present?:  Neg     Delivery (Maternal) (Provider to Complete) (310573)    Episiotomy:  None  Perineal lacerations:  2nd Repaired?:  Yes      Blood Loss  Mother: Liyah Rodriguez Guadalupe Leong #[id number]   Start of Mother's Information    IO Blood Loss  06/03/20 1300 - 06/03/20 2108    None           End of Mother's Information  Mother: Michael Liyah Leong #[id number]         Delivery - Provider to Complete (123116)    Delivering clinician:  Yvonne Reese MD  Attempted Delivery Types (Choose all that apply):  Spontaneous Vaginal Delivery  Delivery Type (Choose the 1 that will go to the Birth History):   Vaginal, Spontaneous   Other personnel:   Provider Role   Yvonne Reese MD Physician   Yvonne Hernandez MD Resident   Juliana Steel RN Registered Nurse   Britt Joseph RN Registered Nurse         Placenta    Delayed Cord Clamping:  Done  Date/Time:  6/3/2020  8:48 PM  Removal:  Spontaneous  Disposition:  Hospital disposal     Anesthesia    Method:  Epidural  Cervical dilation at placement:  4-7          Presentation and Position    Presentation:  Vertex  Position:  Left Occiput Anterior           Yvonne Hernandez MD

## 2020-06-05 VITALS
BODY MASS INDEX: 40.57 KG/M2 | HEART RATE: 96 BPM | TEMPERATURE: 97.9 F | RESPIRATION RATE: 18 BRPM | HEIGHT: 63 IN | WEIGHT: 229 LBS | OXYGEN SATURATION: 98 % | SYSTOLIC BLOOD PRESSURE: 125 MMHG | DIASTOLIC BLOOD PRESSURE: 80 MMHG

## 2020-06-05 PROCEDURE — 25000132 ZZH RX MED GY IP 250 OP 250 PS 637: Performed by: STUDENT IN AN ORGANIZED HEALTH CARE EDUCATION/TRAINING PROGRAM

## 2020-06-05 RX ADMIN — IBUPROFEN 800 MG: 800 TABLET, FILM COATED ORAL at 07:07

## 2020-06-05 RX ADMIN — ACETAMINOPHEN 650 MG: 325 TABLET, FILM COATED ORAL at 07:07

## 2020-06-05 RX ADMIN — ACETAMINOPHEN 650 MG: 325 TABLET, FILM COATED ORAL at 12:11

## 2020-06-05 RX ADMIN — IBUPROFEN 800 MG: 800 TABLET, FILM COATED ORAL at 00:37

## 2020-06-05 RX ADMIN — ACETAMINOPHEN 650 MG: 325 TABLET, FILM COATED ORAL at 00:37

## 2020-06-05 NOTE — PLAN OF CARE
Pt stable this shift with postpartum checks. Pt is breastfeeding her baby with some assistance at times to latch but did verify good latch. .. Pt is having good pain relief and .uses ibuprofen and tylenol . Plan is for discharge later today. Will continue to monitor for changes and assist as needed.

## 2020-06-05 NOTE — PROGRESS NOTES
[facility] Obstetrics Postpartum Progress Note    Para 1011, PPD#2,  @ 38w4d    S: Patient states she is doing well.  She denies headache, vision changes, chest pain, shortness of breath, nausea/vomiting, upper abdominal pain.  Pain well controlled with current pain meds. Lochia light.  Ambulating without difficulty.  Voiding without difficulty.    O:  Patient Vitals for the past 24 hrs:   BP Temp Temp src Pulse Heart Rate Resp   20 0000 137/88 97.8  F (36.6  C) Oral -- 90 16   20 1543 134/88 98.7  F (37.1  C) Oral -- 88 16   20 1037 (!) 142/70 -- -- 96 -- --   20 0951 (!) 141/74 98.2  F (36.8  C) Oral 101 -- 18     Gen:  NAD, appears comfortable  normal respiratory and cardiovascular effort   Abd: soft, nondistended, nontender, fundus firm at below umbilicus  Ext: non-tender, 1+ lower extremity edema, no erythema    Hemoglobin   Date Value Ref Range Status   2020 10.5 (L) 11.7 - 15.7 g/dL Final   2020 11.8 11.7 - 15.7 g/dL Final       A/P: 29 year old  PPD#2 s/p  following induction of labor for chronic hypertension, doing well postpartum    1. Heme: the patient had an admission Hgb of 11.8, an EBL of 350cc was associated with delivery.  PPD1 Hgb is 10.5, appropriate.  Patient is not tachycardic or hypotensive.  2. Chronic hypertension: HELLP labs wnl, UPC 0.18.  No symptoms of preeclampsia, blood pressures at goal.  3. Sinus tachycardia: continue labetalol 50mg BID, (prior to admission med).  4. Pain control: pain is well-controlled with Tylenol & Motrin, continue.  5. Rh positive  6. Rubella immune  7. Routine postpartum:    encourage breastfeeding    Encourage ambulation    Continue regular diet    Continue bowel regimen    Contraception: undecided  8. Dispo: likely discharge home today, plan for home BP monitoring.    Mirian Richard MD  PGY-4 OB/GYN  OB G2 Pager [phone]  2020 7:55 AM        Physician Attestation   I, Coni Barnett, personally  saw and evaluated Liyah Rodriguez.  I have reviewed the above note and agree with details and plan for discharge. He BP's have been normal today.  Yesterday had mild range.  She is not symptomatic.  She was started on labetalol by cards for maternal tachycardia, will have her continue that for now until she follows up with cardiology.  Discussed postpartum instructions/restrictions and follow up. Warning signs and symptoms reviewed. Will call if elevated BP's after discharge.    Coni Barnett MD  June 5, 2020

## 2020-06-05 NOTE — PLAN OF CARE
Data: Vital signs within normal limits. Postpartum checks within normal limits - see flow record. Patient eating and drinking normally. Patient able to empty bladder independently and is up ambulating.  Patient performing self cares and is able to care for infant.  Action: Patient discharge instructions reviewed. Breast pump and home medications given to patient. Patient to follow up in 6 weeks. Encouraged patient to call with any concerns.  Response: Positive attachment behaviors observed with infant. Patient is stable.  Plan: Patient discharged to home.

## 2020-06-29 ENCOUNTER — LACTATION ENCOUNTER (OUTPATIENT)
Age: 30
End: 2020-06-29

## 2020-06-29 NOTE — LACTATION NOTE
This note was copied from a baby's chart.  STEPHANIE phone call received from Dr. Lee requesting further communication with Liyah for persistent nipple discomfort.  STEPHANIE attempted to speak with Liyah, however there was a poor connection from her cell phone.  STEPHANIE will call her again in the morning. She is aware and agreeable to the plan since she is still at the Pediatrician office.

## 2020-07-07 ENCOUNTER — VIRTUAL VISIT (OUTPATIENT)
Dept: CARDIOLOGY | Facility: CLINIC | Age: 30
End: 2020-07-07
Attending: NURSE PRACTITIONER
Payer: COMMERCIAL

## 2020-07-07 VITALS
BODY MASS INDEX: 37.2 KG/M2 | SYSTOLIC BLOOD PRESSURE: 124 MMHG | WEIGHT: 210 LBS | DIASTOLIC BLOOD PRESSURE: 80 MMHG | HEART RATE: 66 BPM

## 2020-07-07 DIAGNOSIS — R00.0 TACHYCARDIA: ICD-10-CM

## 2020-07-07 DIAGNOSIS — O10.919 CHRONIC HYPERTENSION AFFECTING PREGNANCY: Primary | ICD-10-CM

## 2020-07-07 PROCEDURE — 99213 OFFICE O/P EST LOW 20 MIN: CPT | Mod: 95 | Performed by: NURSE PRACTITIONER

## 2020-07-07 NOTE — LETTER
2020    Soumya Castellon MD  [address]  [facility] [address]    RE: Liyah Rodriguez       Dear Colleague,    I had the pleasure of seeing Liyah Rodriguez in the [facility].    Liyah Rodriguez is a 30 year old female who is being evaluated via a billable video visit.        HPI and Plan:   I had the pleasure of seeing Liyah Rodriguez today at [facility] for evaluation of tachycardia. She is a pleasant 29 year old patient of Dr. Amaya.     Ms. Rodriguez has a past medical history significant for hypertension and tachycardia.  She has a family history of hypertension in both mother and father.  Unfortunately, her father  of an aneurysm at an early age.  The patient is currently 36 weeks pregnant with her first term pregnancy.    She was referred to cardiology and seen by Dr. Amaya in May 2020 for hypertension and tachycardia.  She wore a monitor that did not show any malignant arrhythmia.  Her heart rate would elevate 260 bpm with minimal activity and her blood pressure maintained 140s/80s.  She was started on labetalol 50 mg twice daily.  She followed up shortly after the addition of labetalol.  Her blood pressure remained elevated in the 150s/80s.  She was hesitant to begin additional medication therefore, labetalol was continued.    She delivered a healthy baby girl on Carmen 3, 2020.  There were no complications.  Labetalol was discontinued after delivery as the patient's blood pressure and heart rate remained normal.  Her heart rate sustained 60 bpm with a blood pressure of 120s/70s.    Today she presents to a virtual cardiology visit to assess her blood pressure and heart rate.  She is doing well with significant improvement in her heart rate and blood pressure. The labetalol was discontinued after delivery. Her blood pressure today is 124/78, heart rate 60 bpm.      ROS:  12-pt ROS  is negative except for as noted above.    EXAM:  A limited exam was conducted via video.  Constitutional: Patient is pleasant, alert, in no distress. Normal body habitus, upright.  ENT: Membranes moist, no nasal discharge or bleeding gums. Normal head shape, no evidence of injury or laceration.  EYES: No scleral icterus, normal conjunctivae. EOM's appear intact.   Neck: No evidence of thyromegaly.   Chest/Lungs: Appears to have normal respiratory effort. No audible wheezing, no cough, equal chest wall expansion.   Cardiovascular: No evidence of pitting edema bilaterally.  Abdomen: No evidence of abdominal distention. No observed jaundice.  Extremities: No edema, erythema, cyanosis noted.  Skin: No rashes or lesions appreciated on visualized skin, normal skin color.  Neurologic: Normal mentation. Normal arm motion bilaterally, no tremors. No evidence of focal defect.  Psychiatric: Appropriate affect. A&Ox3.    Assessment and Plan  1.  Hypertension and tachycardia, resolved. The patient's hypertension and tachycardia has resolved since delivery. This was likely related to hormone fluctuation. I did counsil her this could occur with subsequent pregnancies. She states her understandings. There is no need for her to continue to follow with cardiology. She was see her OB and primary on a routine basis.      Thank you for allowing me to care for Liyah Rodriguez today.    Phone duration: 5 minutes     This visit is being conducted as a virtual visit due to the emphasis on mitigation of the COVID-19 virus pandemic. The clinician has decided that the risk of an in-office visit outweighs the benefit for this patient.     DALE Barron, CNP  Cardiology    Voice recognition software was used for this note, I have reviewed this note, but errors may have been missed.    No orders of the defined types were placed in this encounter.    No orders of the defined types were placed in this encounter.    There are no  discontinued medications.      CURRENT MEDICATIONS:  Current Outpatient Medications   Medication Sig Dispense Refill     acetaminophen (TYLENOL) 325 MG tablet Take 2 tablets (650 mg) by mouth every 6 hours as needed for mild pain Start after Delivery. 100 tablet 0     Doxylamine Succinate, Sleep, (UNISOM PO) Take 12.5 mg by mouth nightly as needed        ferrous sulfate (FEROSUL) 325 (65 Fe) MG tablet Take 325 mg by mouth daily (with breakfast)       ibuprofen (ADVIL/MOTRIN) 600 MG tablet Take 1 tablet (600 mg) by mouth every 6 hours as needed for moderate pain Start after delivery 60 tablet 0     Prenatal Vit-Fe Fumarate-FA (PRENATAL VITAMIN PO) Take 1 tablet by mouth daily        labetalol (NORMODYNE) 100 MG tablet Take 0.5 tablets (50 mg) by mouth 2 times daily (Patient not taking: Reported on 7/7/2020) 180 tablet 3     senna-docusate (SENOKOT-S/PERICOLACE) 8.6-50 MG tablet Take 1 tablet by mouth daily Start after delivery. (Patient not taking: Reported on 7/7/2020) 100 tablet 0       ALLERGIES     Allergies   Allergen Reactions     Nkda [No Known Drug Allergies]        PAST MEDICAL HISTORY:  Past Medical History:   Diagnosis Date     NO ACTIVE PROBLEMS        PAST SURGICAL HISTORY:  Past Surgical History:   Procedure Laterality Date     ACL repair  06/2008     DILATION AND CURETTAGE SUCTION N/A 5/24/2019    Procedure: Suction Dilation and Curettage   (9 Weeks and 5 Days);  Surgeon: Coni Barnett MD;  Location: [facility] OR     ORTHOPEDIC SURGERY Right     knee surgery     Right lateral menisectomy  06/2008     XR HYSTEROSALPINGOGRAM  02/08/2019    normal fill and spill       FAMILY HISTORY:  Family History   Problem Relation Age of Onset     Heart Disease Paternal Grandmother         CHF     Hypertension Mother      Hypertension Maternal Grandmother      High cholesterol Maternal Grandmother      Cancer Maternal Grandfather         lung cancer     Cancer Father 50        lymphoma     Aneurysm Father 63         spotty vision, occasional weakness prior- then sudden death, smoker       SOCIAL HISTORY:  Social History     Socioeconomic History     Marital status:      Spouse name: None     Number of children: None     Years of education: None     Highest education level: None   Occupational History     Employer: [employer]     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[employer]   Social Needs     Financial resource strain: None     Food insecurity     Worry: None     Inability: None     Transportation needs     Medical: None     Non-medical: None   Tobacco Use     Smoking status: Never Smoker     Smokeless tobacco: Never Used   Substance and Sexual Activity     Alcohol use: None     Comment: occ     Drug use: None     Sexual activity: Yes     Partners: Male     Birth control/protection: None   Lifestyle     Physical activity     Days per week: None     Minutes per session: None     Stress: Not at all   Relationships     Social connections     Talks on phone: None     Gets together: None     Attends Scientologist service: None     Active member of club or organization: None     Attends meetings of clubs or organizations: None     Relationship status: None     Intimate partner violence     Fear of current or ex partner: No     Emotionally abused: No     Physically abused: No     Forced sexual activity: No   Other Topics Concern     None   Social History Narrative     None       Recent Lab Results:  LIPID RESULTS:  Lab Results   Component Value Date    CHOL 145 12/08/2015    HDL 38 (L) 12/08/2015    LDL 92 12/08/2015    TRIG 73 12/08/2015    CHOLHDLRATIO 4.0 12/05/2014       LIVER ENZYME RESULTS:  Lab Results   Component Value Date    AST 18 06/02/2020    ALT 29 06/02/2020       CBC RESULTS:  Lab Results   Component Value Date    WBC 10.7 06/02/2020    RBC 4.04 06/02/2020    HGB 10.5 (L) 06/04/2020    HCT 36.4 06/02/2020    MCV 90 06/02/2020    MCH 29.2 06/02/2020    MCHC 32.4 06/02/2020    RDW 14.0 06/02/2020     06/02/2020       BMP RESULTS:  Lab  Results   Component Value Date     12/16/2019    POTASSIUM 3.9 12/16/2019    CHLORIDE 108 12/16/2019    CO2 20 12/16/2019    ANIONGAP 10 12/16/2019     (H) 12/16/2019    BUN 12 12/16/2019    CR 0.61 06/02/2020    GFRESTIMATED >90 06/02/2020    GFRESTBLACK >90 06/02/2020    JACKIE 8.8 12/16/2019        A1C RESULTS:  No results found for: A1C    INR RESULTS:  No results found for: INR        Thank you for allowing me to participate in the care of your patient.    Sincerely,     DALE Barron CNP     [facility]

## 2020-07-07 NOTE — PROGRESS NOTES
"Liyah Rodriguez is a 30 year old female who is being evaluated via a billable video visit.      The patient has been notified of following:     \"This video visit will be conducted via a call between you and your physician/provider. We have found that certain health care needs can be provided without the need for an in-person physical exam.  This service lets us provide the care you need with a video conversation.  If a prescription is necessary we can send it directly to your pharmacy.  If lab work is needed we can place an order for that and you can then stop by our lab to have the test done at a later time.    Video visits are billed at different rates depending on your insurance coverage.  Please reach out to your insurance provider with any questions.    If during the course of the call the physician/provider feels a video visit is not appropriate, you will not be charged for this service.\"    Patient has given verbal consent for Video visit? Yes  How would you like to obtain your AVS? [place]  Patient would like the video invitation sent by: Text to cell phone: [phone]  Will anyone else be joining your video visit? No     Review Of Systems  Skin: NEGATIVE  Eyes:Ears/Nose/Throat: NEGATIVE  Respiratory: NEGATIVE no issues  Cardiovascular:NEGATIVE no issues  Gastrointestinal: NEGATIVE  Genitourinary:NEGATIVE   Musculoskeletal: NEGATIVE  Neurologic: NEGATIVE  Psychiatric: NEGATIVE  Hematologic/Lymphatic/Immunologic: NEGATIVE  Endocrine:  NEGATIVE  Vitals: /80 Pulse 66 Weight 210lbs    Telephone number of patient: [phone]      Video-Visit Details    Type of service:  Video Visit    Video Start Time: 10:45  Video End Time: 10:50    Originating Location (pt. Location): Home    Distant Location (provider location):  [facility]     Platform used for Video Visit: Katty Mora LPN    HPI and Plan:   I had the pleasure of seeing Liyah" Guadalupe Rodriguez today at [facility] for evaluation of tachycardia. She is a pleasant 29 year old patient of Dr. Amaya.     Ms. Rodriguez has a past medical history significant for hypertension and tachycardia.  She has a family history of hypertension in both mother and father.  Unfortunately, her father  of an aneurysm at an early age.  The patient is currently 36 weeks pregnant with her first term pregnancy.    She was referred to cardiology and seen by Dr. Amaya in May 2020 for hypertension and tachycardia.  She wore a monitor that did not show any malignant arrhythmia.  Her heart rate would elevate 260 bpm with minimal activity and her blood pressure maintained 140s/80s.  She was started on labetalol 50 mg twice daily.  She followed up shortly after the addition of labetalol.  Her blood pressure remained elevated in the 150s/80s.  She was hesitant to begin additional medication therefore, labetalol was continued.    She delivered a healthy baby girl on Carmen 3, 2020.  There were no complications.  Labetalol was discontinued after delivery as the patient's blood pressure and heart rate remained normal.  Her heart rate sustained 60 bpm with a blood pressure of 120s/70s.    Today she presents to a virtual cardiology visit to assess her blood pressure and heart rate.  She is doing well with significant improvement in her heart rate and blood pressure. The labetalol was discontinued after delivery. Her blood pressure today is 124/78, heart rate 60 bpm.      ROS:  12-pt ROS is negative except for as noted above.    EXAM:  A limited exam was conducted via video.  Constitutional: Patient is pleasant, alert, in no distress. Normal body habitus, upright.  ENT: Membranes moist, no nasal discharge or bleeding gums. Normal head shape, no evidence of injury or laceration.  EYES: No scleral icterus, normal conjunctivae. EOM's appear intact.   Neck: No evidence of thyromegaly.   Chest/Lungs: Appears to  have normal respiratory effort. No audible wheezing, no cough, equal chest wall expansion.   Cardiovascular: No evidence of pitting edema bilaterally.  Abdomen: No evidence of abdominal distention. No observed jaundice.  Extremities: No edema, erythema, cyanosis noted.  Skin: No rashes or lesions appreciated on visualized skin, normal skin color.  Neurologic: Normal mentation. Normal arm motion bilaterally, no tremors. No evidence of focal defect.  Psychiatric: Appropriate affect. A&Ox3.    Assessment and Plan  1.  Hypertension and tachycardia, resolved. The patient's hypertension and tachycardia has resolved since delivery. This was likely related to hormone fluctuation. I did counsil her this could occur with subsequent pregnancies. She states her understandings. There is no need for her to continue to follow with cardiology. She was see her OB and primary on a routine basis.      Thank you for allowing me to care for Liyah Rodriguez today.    Phone duration: 5 minutes     This visit is being conducted as a virtual visit due to the emphasis on mitigation of the COVID-19 virus pandemic. The clinician has decided that the risk of an in-office visit outweighs the benefit for this patient.     DALE Barron, CNP  Cardiology    Voice recognition software was used for this note, I have reviewed this note, but errors may have been missed.    No orders of the defined types were placed in this encounter.    No orders of the defined types were placed in this encounter.    There are no discontinued medications.      CURRENT MEDICATIONS:  Current Outpatient Medications   Medication Sig Dispense Refill     acetaminophen (TYLENOL) 325 MG tablet Take 2 tablets (650 mg) by mouth every 6 hours as needed for mild pain Start after Delivery. 100 tablet 0     Doxylamine Succinate, Sleep, (UNISOM PO) Take 12.5 mg by mouth nightly as needed        ferrous sulfate (FEROSUL) 325 (65 Fe) MG tablet Take 325 mg by mouth  daily (with breakfast)       ibuprofen (ADVIL/MOTRIN) 600 MG tablet Take 1 tablet (600 mg) by mouth every 6 hours as needed for moderate pain Start after delivery 60 tablet 0     Prenatal Vit-Fe Fumarate-FA (PRENATAL VITAMIN PO) Take 1 tablet by mouth daily        labetalol (NORMODYNE) 100 MG tablet Take 0.5 tablets (50 mg) by mouth 2 times daily (Patient not taking: Reported on 7/7/2020) 180 tablet 3     senna-docusate (SENOKOT-S/PERICOLACE) 8.6-50 MG tablet Take 1 tablet by mouth daily Start after delivery. (Patient not taking: Reported on 7/7/2020) 100 tablet 0       ALLERGIES     Allergies   Allergen Reactions     Nkda [No Known Drug Allergies]        PAST MEDICAL HISTORY:  Past Medical History:   Diagnosis Date     NO ACTIVE PROBLEMS        PAST SURGICAL HISTORY:  Past Surgical History:   Procedure Laterality Date     ACL repair  06/2008     DILATION AND CURETTAGE SUCTION N/A 5/24/2019    Procedure: Suction Dilation and Curettage   (9 Weeks and 5 Days);  Surgeon: Coni Barnett MD;  Location: [facility] OR     ORTHOPEDIC SURGERY Right     knee surgery     Right lateral menisectomy  06/2008     XR HYSTEROSALPINGOGRAM  02/08/2019    normal fill and spill       FAMILY HISTORY:  Family History   Problem Relation Age of Onset     Heart Disease Paternal Grandmother         CHF     Hypertension Mother      Hypertension Maternal Grandmother      High cholesterol Maternal Grandmother      Cancer Maternal Grandfather         lung cancer     Cancer Father 50        lymphoma     Aneurysm Father 63        spotty vision, occasional weakness prior- then sudden death, smoker       SOCIAL HISTORY:  Social History     Socioeconomic History     Marital status:      Spouse name: None     Number of children: None     Years of education: None     Highest education level: None   Occupational History     Employer: [employer]     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[employer]   Social Needs     Financial resource strain: None     Food insecurity     Worry:  None     Inability: None     Transportation needs     Medical: None     Non-medical: None   Tobacco Use     Smoking status: Never Smoker     Smokeless tobacco: Never Used   Substance and Sexual Activity     Alcohol use: None     Comment: occ     Drug use: None     Sexual activity: Yes     Partners: Male     Birth control/protection: None   Lifestyle     Physical activity     Days per week: None     Minutes per session: None     Stress: Not at all   Relationships     Social connections     Talks on phone: None     Gets together: None     Attends Mandaen service: None     Active member of club or organization: None     Attends meetings of clubs or organizations: None     Relationship status: None     Intimate partner violence     Fear of current or ex partner: No     Emotionally abused: No     Physically abused: No     Forced sexual activity: No   Other Topics Concern     None   Social History Narrative     None       Recent Lab Results:  LIPID RESULTS:  Lab Results   Component Value Date    CHOL 145 12/08/2015    HDL 38 (L) 12/08/2015    LDL 92 12/08/2015    TRIG 73 12/08/2015    CHOLHDLRATIO 4.0 12/05/2014       LIVER ENZYME RESULTS:  Lab Results   Component Value Date    AST 18 06/02/2020    ALT 29 06/02/2020       CBC RESULTS:  Lab Results   Component Value Date    WBC 10.7 06/02/2020    RBC 4.04 06/02/2020    HGB 10.5 (L) 06/04/2020    HCT 36.4 06/02/2020    MCV 90 06/02/2020    MCH 29.2 06/02/2020    MCHC 32.4 06/02/2020    RDW 14.0 06/02/2020     06/02/2020       BMP RESULTS:  Lab Results   Component Value Date     12/16/2019    POTASSIUM 3.9 12/16/2019    CHLORIDE 108 12/16/2019    CO2 20 12/16/2019    ANIONGAP 10 12/16/2019     (H) 12/16/2019    BUN 12 12/16/2019    CR 0.61 06/02/2020    GFRESTIMATED >90 06/02/2020    GFRESTBLACK >90 06/02/2020    JACKIE 8.8 12/16/2019        A1C RESULTS:  No results found for: A1C    INR RESULTS:  No results found for: INR        CC  Benjamin Amaya,  MD  [id number]  [place] [id number]

## 2020-07-13 ENCOUNTER — PRENATAL OFFICE VISIT (OUTPATIENT)
Dept: OBGYN | Facility: CLINIC | Age: 30
End: 2020-07-13
Payer: COMMERCIAL

## 2020-07-13 DIAGNOSIS — I73.00 RAYNAUD'S DISEASE WITHOUT GANGRENE: ICD-10-CM

## 2020-07-13 PROBLEM — Z34.90 ENCOUNTER FOR INDUCTION OF LABOR: Status: RESOLVED | Noted: 2020-06-02 | Resolved: 2020-07-13

## 2020-07-13 PROBLEM — Z36.89 ENCOUNTER FOR TRIAGE IN PREGNANT PATIENT: Status: RESOLVED | Noted: 2020-04-16 | Resolved: 2020-07-13

## 2020-07-13 PROBLEM — Z34.00 SUPERVISION OF NORMAL FIRST PREGNANCY, ANTEPARTUM: Status: RESOLVED | Noted: 2019-11-14 | Resolved: 2020-07-13

## 2020-07-13 PROBLEM — Z23 NEED FOR TDAP VACCINATION: Status: RESOLVED | Noted: 2019-11-05 | Resolved: 2020-07-13

## 2020-07-13 PROCEDURE — 99207 ZZC POST PARTUM EXAM: CPT | Performed by: OBSTETRICS & GYNECOLOGY

## 2020-07-13 RX ORDER — NIFEDIPINE 30 MG
30 TABLET, EXTENDED RELEASE ORAL DAILY
Qty: 90 TABLET | Refills: 3 | Status: SHIPPED | OUTPATIENT
Start: 2020-07-13 | End: 2021-09-24

## 2020-07-13 ASSESSMENT — PATIENT HEALTH QUESTIONNAIRE - PHQ9: SUM OF ALL RESPONSES TO PHQ QUESTIONS 1-9: 2

## 2020-08-18 ENCOUNTER — MEDICAL CORRESPONDENCE (OUTPATIENT)
Dept: HEALTH INFORMATION MANAGEMENT | Facility: CLINIC | Age: 30
End: 2020-08-18

## 2020-08-21 ENCOUNTER — TELEPHONE (OUTPATIENT)
Dept: PEDIATRICS | Facility: CLINIC | Age: 30
End: 2020-08-21

## 2020-08-21 DIAGNOSIS — Z91.89 BREASTFEEDING PROBLEM: Primary | ICD-10-CM

## 2020-08-21 NOTE — TELEPHONE ENCOUNTER
I placed a referral for [facility]. She can call [phone] to schedule  Please let her know that Kassie is out of clinic for the week and she may have a better recommendation because of her experience with lactation consulting. She could also check with her OB/GYN is [facility] doesn't work out for her

## 2020-08-21 NOTE — TELEPHONE ENCOUNTER
Kassie, This patient is calling requesting a lactation consult or referral. Any help would be greatly appreciated.   -Mari Landon

## 2020-10-06 ENCOUNTER — MEDICAL CORRESPONDENCE (OUTPATIENT)
Dept: HEALTH INFORMATION MANAGEMENT | Facility: CLINIC | Age: 30
End: 2020-10-06

## 2020-11-22 ENCOUNTER — MYC MEDICAL ADVICE (OUTPATIENT)
Dept: OBGYN | Facility: CLINIC | Age: 30
End: 2020-11-22

## 2020-11-23 ASSESSMENT — PATIENT HEALTH QUESTIONNAIRE - PHQ9
10. IF YOU CHECKED OFF ANY PROBLEMS, HOW DIFFICULT HAVE THESE PROBLEMS MADE IT FOR YOU TO DO YOUR WORK, TAKE CARE OF THINGS AT HOME, OR GET ALONG WITH OTHER PEOPLE: SOMEWHAT DIFFICULT
SUM OF ALL RESPONSES TO PHQ QUESTIONS 1-9: 10
SUM OF ALL RESPONSES TO PHQ QUESTIONS 1-9: 10

## 2020-11-23 ASSESSMENT — ANXIETY QUESTIONNAIRES
GAD7 TOTAL SCORE: 8
4. TROUBLE RELAXING: MORE THAN HALF THE DAYS
5. BEING SO RESTLESS THAT IT IS HARD TO SIT STILL: NOT AT ALL
7. FEELING AFRAID AS IF SOMETHING AWFUL MIGHT HAPPEN: NOT AT ALL
3. WORRYING TOO MUCH ABOUT DIFFERENT THINGS: SEVERAL DAYS
6. BECOMING EASILY ANNOYED OR IRRITABLE: NEARLY EVERY DAY
1. FEELING NERVOUS, ANXIOUS, OR ON EDGE: SEVERAL DAYS
2. NOT BEING ABLE TO STOP OR CONTROL WORRYING: SEVERAL DAYS
GAD7 TOTAL SCORE: 8
7. FEELING AFRAID AS IF SOMETHING AWFUL MIGHT HAPPEN: NOT AT ALL

## 2020-11-23 NOTE — TELEPHONE ENCOUNTER
Patient is 5.5 months postpartum. She is thinking she may have some PP depression going on. Does not feel particularly sad/depressed, more manifests as anger/rage. No thoughts on hurting herself or the baby. We could have her schedule an appointment with Mari Levine. Patient is also curious if she may benefit from medication as well. Currently breastfeeding. Questionnaires were sent to patient today with the following scores:  PHQ-9=10  LEEANN-7=8  Please advise. Danae Key RN

## 2020-11-24 ASSESSMENT — PATIENT HEALTH QUESTIONNAIRE - PHQ9: SUM OF ALL RESPONSES TO PHQ QUESTIONS 1-9: 10

## 2020-11-24 ASSESSMENT — ANXIETY QUESTIONNAIRES: GAD7 TOTAL SCORE: 8

## 2020-11-24 NOTE — TELEPHONE ENCOUNTER
If she desires medication or at least discussion about it, she will need to be scheduled for a phone visit.  Please also schedule her with lavelle Levine if she is interested.  Thanks    NATHALIE YEBOAH MD

## 2020-11-25 ENCOUNTER — MYC MEDICAL ADVICE (OUTPATIENT)
Dept: OBGYN | Facility: CLINIC | Age: 30
End: 2020-11-25

## 2020-11-25 ENCOUNTER — VIRTUAL VISIT (OUTPATIENT)
Dept: OBGYN | Facility: CLINIC | Age: 30
End: 2020-11-25
Payer: COMMERCIAL

## 2020-11-25 PROCEDURE — 99214 OFFICE O/P EST MOD 30 MIN: CPT | Mod: TEL | Performed by: OBSTETRICS & GYNECOLOGY

## 2020-11-25 NOTE — Clinical Note
Starting Liyah on selective serotonin reuptake inhibitor for pp depression. Please send patient a PHQ9 and LEEANN 7 in 3-4 weeks  Thanks Mari Garcia MD

## 2020-11-25 NOTE — PROGRESS NOTES
"Liyah Rodriguez is a 30 year old female who is being evaluated via a billable telephone visit.      The patient has been notified of following:     \"This telephone visit will be conducted via a call between you and your physician/provider. We have found that certain health care needs can be provided without the need for a physical exam.  This service lets us provide the care you need with a short phone conversation.  If a prescription is necessary we can send it directly to your pharmacy.  If lab work is needed we can place an order for that and you can then stop by our lab to have the test done at a later time.    Telephone visits are billed at different rates depending on your insurance coverage. During this emergency period, for some insurers they may be billed the same as an in-person visit.  Please reach out to your insurance provider with any questions.    If during the course of the call the physician/provider feels a telephone visit is not appropriate, you will not be charged for this service.\"    Patient has given verbal consent for Telephone visit?  Yes    What phone number would you like to be contacted at? [phone]    Liyah Rodriguez is a 30 year old  who is 6 months postpartum and dealing with mood issues. Not feeling like herself.     Feels truly happy with her baby. Feeling irritable and angry. No h/o anxiety or depression.     PHQ 2020   PHQ-9 Total Score 2 2 10   Q9: Thoughts of better off dead/self-harm past 2 weeks Not at all Not at all Not at all     LEEANN-7 SCORE 2020   Total Score 8 (mild anxiety)   Total Score 8     30 year old  with postpartum depression  1. Postpartum depression  - sertraline (ZOLOFT) 50 MG tablet; Take 1 tablet (50 mg) by mouth daily Take 1/2 tab for first week, then increase to 1 tab daily  Dispense: 90 tablet; Refill: 3    Discussed medication and therapy. Has appt with Mari Levine next week. " Interested in starting selective serotonin reuptake inhibitor. Will resend GAD7 and PHQ 9 in 3-4 weeks. Patient encouraged to call or send msg if any questions or concerns.     Phone call duration: 15 minutes    Mari Garcia MD

## 2020-11-25 NOTE — PATIENT INSTRUCTIONS
"  Patient Education     Understanding Postpartum Depression  You ve just had a baby. You expected to be excited and happy. But instead you find yourself crying for no reason. You may have trouble coping with your daily tasks. You feel sad, tired, and hopeless most of the time. You may even feel ashamed or guilty. But what you re going through is not your fault and you can feel better. Talk to your healthcare provider. He or she can help.    What is depression?  Depression is a mood disorder that affects the way you think and feel. The most common symptom is a feeling of deep sadness. You may also feel as if you just can t cope with life. Other symptoms include:    Gaining or losing a lot of weight    Sleeping too much or too little    Feeling tired all the time    Feeling restless    Crying a lot    Having too little or too much appetite.    Withdrawing from friends and family    Having headaches, aches and pains, or stomach problems that won't go away.    Fears of harming your baby    Lack of interest in your baby    Feeling worthless or guilty    No longer finding pleasure in things you used to    Having trouble thinking clearly or making decisions    Thinking about death or suicide  Depression after childbirth  You may be weepy and tired right after giving birth. These feelings are normal. They re sometimes called the  baby blues.  These blues go away after 1 to 2 weeks. However, postpartum (meaning  after birth ) depression lasts much longer and is more severe than the \"baby blues.\" It can make you feel sad and hopeless. You may also fear that your baby will be harmed and worry about being a bad mother.  What causes postpartum depression?  The exact cause of postpartum depression is unknown. Changes in brain chemistry or structure are believed to play a big role in depression. It may be due to changes in your hormones during and after childbirth. You may also be tired from caring for your baby and adjusting to " being a mother. All these factors may make you feel depressed. In some cases, your genes may also play a role.  Depression can be treated  There are many ways to treat postpartum depression. Talking to your healthcare provider is the first step toward feeling better.  When to call your healthcare provider  Call your healthcare provider if you:     Cry for no clear reason    Have trouble sleeping, eating, and making choices    Questions whether you can handle caring for a baby    Have intense feelings of sadness, anxiety, or despair that prevent you from being able to do your daily tasks  Resources    National [place] of Mental Fwtqzm717-445-8299cig.[facility].nih.gov    National [place] on Mental Gotomcr108-929-9283vyx.jenni.org    Mental Health Negtwfu139-767-7944cgk.[facility].org    National Suicide Hyasdes678-441-7924 (800-SUICIDE)    Spike last reviewed this educational content on 7/1/2017 2000-2020 [employer]. [address] [place] [address]. All rights reserved. This information is not intended as a substitute for professional medical care. Always follow your healthcare professional's instructions.

## 2020-12-02 ENCOUNTER — VIRTUAL VISIT (OUTPATIENT)
Dept: PSYCHOLOGY | Facility: CLINIC | Age: 30
End: 2020-12-02
Payer: COMMERCIAL

## 2020-12-02 DIAGNOSIS — F41.1 GENERALIZED ANXIETY DISORDER: Primary | ICD-10-CM

## 2020-12-02 PROCEDURE — 90791 PSYCH DIAGNOSTIC EVALUATION: CPT | Mod: 95 | Performed by: SOCIAL WORKER

## 2020-12-02 ASSESSMENT — COLUMBIA-SUICIDE SEVERITY RATING SCALE - C-SSRS
2. HAVE YOU ACTUALLY HAD ANY THOUGHTS OF KILLING YOURSELF LIFETIME?: NO
TOTAL  NUMBER OF ABORTED OR SELF INTERRUPTED ATTEMPTS PAST LIFETIME: NO
TOTAL  NUMBER OF INTERRUPTED ATTEMPTS LIFETIME: NO
ATTEMPT LIFETIME: NO
1. IN THE PAST MONTH, HAVE YOU WISHED YOU WERE DEAD OR WISHED YOU COULD GO TO SLEEP AND NOT WAKE UP?: NO
6. HAVE YOU EVER DONE ANYTHING, STARTED TO DO ANYTHING, OR PREPARED TO DO ANYTHING TO END YOUR LIFE?: NO

## 2020-12-02 NOTE — Clinical Note
I met with Liyah today! I think she's a great candidate for short term/brief therapy with me. I'll see her 3-5 times, and will then refer to longer term therapy if additional support is needed. Please let me know if you have any questions! Thanks for the referral and involving me in her care.      Mari

## 2020-12-02 NOTE — PROGRESS NOTES
"[facility]  Provider Name:  Mari Levine    Credentials:  MSW, LIBAN    PATIENT'S NAME: Liyah Rodriguez  PREFERRED NAME: Liyah  PRONOUNS:   she, her, hers    MRN: [id number]  : 1990   ACCT. NUMBER:  [id number]  DATE OF SERVICE: 20  START TIME: 10:03 am   END TIME: 10: 57am   PREFERRED PHONE:   May we leave a program related message: Yes  SERVICE MODALITY:  Phone Visit:      Provider verified identity through the following two step process.  Patient provided:  Patient  and Patient address    The patient has been notified of the following:      \"We have found that certain health care needs can be provided without the need for a face to face visit.  This service lets us provide the care you need with a phone conversation.       I will have full access to your [place] medical record during this entire phone call.   I will be taking notes for your medical record.      Since this is like an office visit, we will bill your insurance company for this service.       There are potential benefits and risks of telephone visits (e.g. limits to patient confidentiality) that differ from in-person visits.?  Confidentiality still applies for telephone services, and nobody will record the visit.  It is important to be in a quiet, private space that is free of distractions (including cell phone or other devices) during the visit.??      If during the course of the call I believe a telephone visit is not appropriate, you will not be charged for this service\"     Consent has been obtained for this service by care team member: Yes     UNIVERSAL ADULT Mental Health DIAGNOSTIC ASSESSMENT    Identifying Information:  Patient is a 30 year old, .  The pronoun use throughout this assessment reflects the patient's chosen pronoun.  Patient was referred for an assessment by Dr. Mari Garcia.  Patient attended the session alone.     Chief Complaint: " "  The reason for seeking services at this time is: Patient reported that she is currently 6 months postpartum. She stated that over the past month, she has felt \"off\" and not her \"normal self\". She stated that she has been more angry and irritable over small events and situations that would normally not cause her to feel angry and/or irritable.   Patient has not attempted to resolve these concerns in the past. Patient stated that her OBGYN prescribed her a low dose of Zoloft last week.     Patient reported history of low grade baseline anxiety, but has always been able to manage on her own. She stated that she and her  had a difficult journey achieving pregnancy. Patient stated that she has a previous miscarriage at 12 weeks after 18 months of trying.  She shared that her daughter was conceived through IUI.  As a result of her late miscarriage, she experienced anxiety during her first trimester of her daughter's pregnancy due to fear of another loss. She stated that once she made it through the first trimester, her anxiety decreased.      Patient denied significant mental health concerns immediately postpartum. She stated that she felt emotional and overwhelmed, but felt that happiness was the predominant feeling.  She experienced difficulties with breastfeeding due to pain which led to her dreading  Feedings, and stated that her daughter did not sleep well at first. She stated that since feeding and sleeping has improved, she has noticed an improvement in her mood.     Patient stated that she is currently experiencing anxiety about the pandemic, how to navigate risk of exposure, others who take various approaches to the pandemic.  She stated that it is hard for her to know if she can trust others and their level of exposure. Patient stated that additionally, she can feel inadequate and not good enough as she feels overwhelmed at times with up keep of a home, working, caring for her daughter. Patient stated " "that she has also been struggling with her sense of self and body postpartum.     Social/Family History:  Patient reported they grew up in [place], [place].  They were raised by biological parents.  Parents stayed ..   Patient reported that their childhood was \"normal and typical\". Patient stated that her father was diagnosed and treated for cancer when she was in middle school. Patient described their current relationships with family of origin as: Patient stated that her father  4 years ago. She stated that she has a close relationship with her mother, and her mothers helps care for her daughter as they try to avoid the use of  during the pandemic. Despite close relationships, she has had to have difficult conversations with them about various beliefs and approaches to the pandemic.     The patient describes their cultural background as ..  Cultural influences and impact on patient's life structure, values, norms, and healthcare: family focused.  Contextual influences on patient's health include: Individual Factors 6 months postpartum during global pandemic.    These factors will be addressed in the Preliminary Treatment plan.  Patient identified their preferred language to be EnglishEnglish. Patient reported they does not need the assistance of an  or other support involved in therapy.     Patient reported had no significant delays in developmental tasks.   Patient's highest education level was college graduate. Patient identified the following learning problems: none reported.  Modifications will not be used to assist communication in therapy.   Patient reports they are not able to understand written materials.    Patient reported the following relationship history: no prior serious relationships prior to .  Patient's current relationship status is  for 5 years. Patient identified her marriage as \"strong\".  She stated that her  has been encouraging her to " be more open and expressive about her mental health.  She stated that her  has a flexible job, and she is grateful of his ability to be more present with their daughter between her work shifts, her need to sleep.  Patient identified their sexual orientation as heterosexual.  Patient reported having one daughter, who is 6 months old. Patient identified partner, mother and friends as part of their support system.  Patient identified the quality of these relationships as stable and meaningful.      Patient's current living/housing situation involves staying in own home/apartment.  They live with her  and daughter, and they report that housing is stable.     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 Patient reports their finances are obtained through employment and partner.  Patient does not identify finances as a current stressor. Patient identified work as a source of stress due to the pandemic and ongoing limitations to access to PPE.     Patient reported that they have not been involved with the legal system.   Patient denies being on probation / parole / under the jurisdiction of the court.    Patient's Strengths and Limitations:  Patient identified the following strengths or resources that will help them succeed in treatment: commitment to health and well being, friends / good social support, family support, insight, intelligence and motivation. Things that may interfere with the patient's success in treatment include: none identified.     Personal and Family Medical History:   Patient does report a family history of mental health concerns.  Mother with a history of anxiety. Patient reports family history includes Aneurysm (age of onset: 63) in her father; Cancer in her maternal grandfather; Cancer (age of onset: 50) in her father; Heart Disease in her paternal grandmother; High cholesterol in her maternal  grandmother; Hypertension in her maternal grandmother and mother..     Patient does not report Mental Health Diagnosis or Treatment.     Patient has had a physical exam to rule out medical causes for current symptoms.  Date of last physical exam was within the past year. Client was encouraged to follow up with PCP if symptoms were to develop. The patient has a [facility] Primary [facility] Provider, who is named Soumya Castellon..  Patient reports no current medical concerns.  There are not significant appetite / nutritional concerns / weight changes.   Patient does not report a history of head injury / trauma / cognitive impairment.      Patient cannot supply information needed to verify current medications    Medication Adherence:  Patient reports taking prescribed medications as prescribed.    Patient Allergies:    Allergies   Allergen Reactions     Nkda [No Known Drug Allergies]        Medical History:    Past Medical History:   Diagnosis Date     NO ACTIVE PROBLEMS          Current Mental Status Exam:   Appearance:  unable to assess due to telephone visit    Eye Contact:  unable to assess due to telephone visit   Psychomotor:  unable to assess due to telephone visit       Gait / station:  Unable to assess due to telephone visit   Attitude / Demeanor: Cooperative  Interested Friendly Pleasant  Speech      Rate / Production: Normal/ Responsive      Volume:  Normal  volume      Language:  intact  Mood:   Anxious   Affect:   unable to assess due to telephone visit    Thought Content: Clear   Thought Process: Coherent  Goal Directed  Logical       Associations: No loosening of associations  Insight:   Good   Judgment:  Intact   Orientation:  All  Attention/concentration: Good    Rating Scales:      PHQ9:    PHQ-9 SCORE 7/13/2020 11/23/2020 12/6/2020   PHQ-9 Total Score MyChart - 10 (Moderate depression) 5 (Mild depression)   PHQ-9 Total Score 2 10 5   ;    GAD7:    LEEANN-7 SCORE 11/23/2020 12/6/2020   Total Score 8  (mild anxiety) 8 (mild anxiety)   Total Score 8 8     CGI:     First:Considering your total clinical experience with this particular patient population, how severe are the patient's symptoms at this time?: 3 (2020 10:48 AM)  ;    Most recentCompared to the patient's condition at the START of treatment, this patient's condition is: 3 (2020 10:48 AM)      Substance Use:  Patient did report a family history of substance use concerns; father likely abused alcohol. Patient stated that she has uncles who experienced alcohol abuse. Patient has not received chemical dependency treatment in the past.  Patient has not ever been to detox.      Patient is not currently receiving any chemical dependency treatment. Patient reported the following problems as a result of their substance use: N/A.    Patient denies using alcohol.  Patient denies using tobacco.  Patient denies using marijuana.  Patient reports using caffeine 1 times per day and drinks 1 at a time.   Patient reports using/abusing the following substance(s). Patient reported no other substance use.     CAGE- AID:    CAGE-AID Total Score 2020   Total Score 0       Substance Use: No symptoms    Based on the negative CAGE score and clinical interview there  are not indications of drug or alcohol abuse.      Significant Losses / Trauma / Abuse / Neglect Issues:   Patient did not serve in the .  There are indications or report of significant loss, trauma, abuse or neglect issues related to: father's cancer diagnosis in middle school, cousin  by suicide and aunt hit by car and  while in college, death of father 4 years ago.  Concerns for possible neglect are not present.    Safety Assessment:   Current Safety Concerns:  [place] Suicide Severity Rating Scale (Lifetime/Recent)  [place] Suicide Severity Rating (Lifetime/Recent) 2020   1. Wish to be Dead (Lifetime) No   2. Non-Specific Active Suicidal Thoughts (Lifetime) No   Actual Attempt  (Lifetime) No   Has subject engaged in non-suicidal self-injurious behavior? (Lifetime) No   Interrupted Attempts (Lifetime) No   Aborted or Self-Interrupted Attempt (Lifetime) No   Preparatory Acts or Behavior (Lifetime) No   Most Recent Attempt Actual Lethality Code NA   Most Lethal Attempt Actual Lethality Code NA   Initial/First Attempt Actual Lethality Code NA     Patient denies current homicidal ideation and behaviors.  Patient denies current self-injurious ideation and behaviors.    Patient denied risk behaviors associated with substance use.  Patient denies any high risk behaviors associated with mental health symptoms.  Patient reports the following current concerns for their personal safety: None.  Patient reports there are  firearms in the house. Patient stated that the firearm is not locked up, but stated that there is not any ammunition in the home. Patient advised to secure firearms.     History of Safety Concerns:  Patient denied a history of homicidal ideation.     Patient denied a history of personal safety concerns.    Patient denied a history of assaultive behaviors.    Patient denied a history of sexual assault behaviors.     Patient denied a history of risk behaviors associated with substance use.  Patient denies any history of high risk behaviors associated with mental health symptoms.  Patient reports the following protective factors: forward/future oriented thinking, dedication to family/friends, safe and stable environment, regular sleep, living with other people, daily obligations, structured day, committment to well-being, sense of meaning, financial stability and access to a variety of clinical interventions    Risk Plan:  See Recommendations for Safety and Risk Management Plan    Review of Symptoms per patient report:  Depression: No symptoms  [place]:  No Symptoms  Psychosis: No Symptoms  Anxiety: Excessive worry, Nervousness, Sleep disturbance, Ruminations, Poor concentration and  Irritability  Panic:  No symptoms  Post Traumatic Stress Disorder:  No Symptoms   Eating Disorder: No Symptoms  ADD / ADHD:  No symptoms  Conduct Disorder: No symptoms  Autism Spectrum Disorder: No symptoms  Obsessive Compulsive Disorder: No Symptoms    Patient reports the following compulsive behaviors and treatment history: None identiifed.      Diagnostic Criteria:   A. Excessive anxiety and worry about a number of events or activities (such as work or school performance).   B. The person finds it difficult to control the worry.  C. Select 3 or more symptoms (required for diagnosis). Only one item is required in children.   - Restlessness or feeling keyed up or on edge.    - Difficulty concentrating or mind going blank.    - Irritability.    - Sleep disturbance (difficulty falling or staying asleep, or restless unsatisfying sleep).   D. The focus of the anxiety and worry is not confined to features of an Axis I disorder.  E. The anxiety, worry, or physical symptoms cause clinically significant distress or impairment in social, occupational, or other important areas of functioning.   F. The disturbance is not due to the direct physiological effects of a substance (e.g., a drug of abuse, a medication) or a general medical condition (e.g., hyperthyroidism) and does not occur exclusively during a Mood Disorder, a Psychotic Disorder, or a Pervasive Developmental Disorder.    Functional Status:  Patient reports the following functional impairments: home life with .     WHODAS:   WHODAS 2.0 Total Score 12/6/2020   Total Score 12   Total Score [facility] 12        Clinical Summary:  1. Reason for assessment: Patient is currently 6 months postpartum, and expressed concern about irritability and anger over the past month.   .  2. Psychosocial, Cultural and Contextual Factors:   First time mother, 6 months postpartum during pandemic, works as nurse with limited access to PPE  3. Principal DSM5 Diagnoses  (Sustained by  DSM5 Criteria Listed Above):   300.02 (F41.1) Generalized Anxiety Disorder .  4. Other Diagnoses that is relevant to services:  None identified  5. Provisional Diagnosis: None identified.  6. Prognosis: Return to Normal Functioning.  7. Likely consequences of symptoms if not treated: Worsening symptoms of anxiety which would impact ability to care for self, daughter, and work..  8. Client strengths include:  employed, goal-focused, insightful, intelligent, motivated, open to learning, open to suggestions / feedback, support of family, friends and providers and willing to ask questions .     Recommendations:     1. Plan for Safety and Risk Management:   Recommended that patient call 911 or go to the local ED should there be a change in any of these risk factors..  Report to child / adult protection services was NA.     2. Patient's identified cultural concerns will be addressed by ongoing exploration between culture and impact on mental health .     3. Initial Treatment will focus on:    Anxiety - postpartum anxiety.     4. Resources/Service Plan:    services are not indicated.   Modifications to assist communication are not indicated.   Additional disability accommodations are not indicated.      5. Collaboration:   Collaboration / coordination of treatment will be initiated with the following  support professionals: GENNARO Mendoza.      6.  Referrals:   The following referral(s) will be initiated: [facility] services with LIBAN Berumen. Next Scheduled Appointment: 12/17. Patient aware of [facility] model of care, and is in agreement with brief intervention/short episode of care with referral if symptoms do not improve.      A Release of Information has been obtained for the following: no JOSS needed.    7. ABHI:    ABHI:  Discussed the general effects of drugs and alcohol on health and well-being. Provider gave patient printed information about the effects of chemical use on their health and well being.  Recommendations:  No additional recommendations. .     8. Records:   These were reviewed at time of assessment.   Information in this assessment was obtained from the medical record and provided by patient who is a good historian.   Patient will have open access to their mental health medical record.        Provider Name/ Credentials:  LIBAN Berumen  December 2, 2020

## 2020-12-03 ENCOUNTER — MEDICAL CORRESPONDENCE (OUTPATIENT)
Dept: HEALTH INFORMATION MANAGEMENT | Facility: CLINIC | Age: 30
End: 2020-12-03

## 2020-12-09 ENCOUNTER — MYC MEDICAL ADVICE (OUTPATIENT)
Dept: OBGYN | Facility: CLINIC | Age: 30
End: 2020-12-09

## 2020-12-14 NOTE — TELEPHONE ENCOUNTER
Message sent back to patient    Yvonne Reese MD    
New message sent since now we have the data    Thanks  Yvonne Reese MD    
Routing to BE. Pt wondering if you have any recommendations with getting the COVID vaccine while breast feeding.   Danae Mota, RN-BSN    
today

## 2020-12-17 ENCOUNTER — VIRTUAL VISIT (OUTPATIENT)
Dept: PSYCHOLOGY | Facility: CLINIC | Age: 30
End: 2020-12-17
Payer: COMMERCIAL

## 2020-12-17 DIAGNOSIS — F41.1 GENERALIZED ANXIETY DISORDER: Primary | ICD-10-CM

## 2020-12-17 PROCEDURE — 90834 PSYTX W PT 45 MINUTES: CPT | Mod: 95 | Performed by: SOCIAL WORKER

## 2020-12-17 NOTE — PROGRESS NOTES
"[facility]  December 17, 2020    Liyah Rodriguez is a 30 year old female who is being evaluated via a telephone visit.      The patient has been notified of the following:     \"We have found that certain health care needs can be provided without the need for a face to face visit.  This service lets us provide the care you need with a short phone conversation.      I will have full access to your [facility] medical record during this entire phone call.   I will be taking notes for your medical record.     Since this is like an office visit, we will bill your insurance company for this service.  Please check with your medical insurance if this type of telephone visit/virtual care is covered.  You may be responsible for the cost of this service if insurance coverage is denied.      There are potential benefits and risks of telephone visits (e.g. limits to patient confidentiality) that differ from in-person visits.?  Confidentiality still applies for telephone services, and nobody will record the visit.  It is important to be in a quiet, private space that is free of distractions (including cell phone or other devices) during the visit.??     If during the course of the call I believe a telephone visit is not appropriate, you will not be charged for this service\"    Consent has been obtained for this service by care team member: yes.      Behavioral Health Clinician Progress Note    Patient Name: Liyah Rodriguez           Service Type:  Individual      Service Location:   Phone call (patient / identified key support person reached)     Session Start Time: 10:04 am   Session End Time: 10:45 am      Session Length: 38 - 52      Attendees: Patient    Visit Activities (Refresh list every visit): [facility] Only    Diagnostic Assessment Date: 12/2/20  Treatment Plan Review Date: 12/17/20  See Flowsheets for today's PHQ-9 and LEEANN-7 results  Previous PHQ-9:   PHQ-9 SCORE 7/13/2020 " "11/23/2020 12/6/2020   PHQ-9 Total Score MyChart - 10 (Moderate depression) 5 (Mild depression)   PHQ-9 Total Score 2 10 5     Previous LEEANN-7:   LEEANN-7 SCORE 11/23/2020 12/6/2020   Total Score 8 (mild anxiety) 8 (mild anxiety)   Total Score 8 8       AC LEVEL:  AC Score (Last Two) 11/8/2013   AC Raw Score 51   Activation Score 91.6   AC Level 4       DATA  Extended Session (60+ minutes): No  Interactive Complexity: No  Crisis: No  [facility] Patient: No    Treatment Objective(s) Addressed in This Session:  Target Behavior(s): anxiety    Anxiety: will experience a reduction in anxiety, will develop more effective coping skills to manage anxiety symptoms, will develop healthy cognitive patterns and beliefs and will increase ability to function adaptively    Current Stressors / Issues:  Patient stated that she has minimal to no concerns about anger and irritability. She stated that she is now feeling back to baseline and her \"usual self\". She stated that she does note the baseline anxiety she feels, primarily on days in which she is working. Patient stated that the anxiety is not directly correlated with the pandemic, but is more of a general anxiety based on the context of her work. 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 She stated that based on this pressure, she can have a hard time processing her shift and transitioning from work to home.  Explored with patient strategies to reduce feelings of being pressured/hurried at the end of the day. Introduced and reviewed mindfulness skills to help her remain present, and cognitive restructuring techniques to assist with ruminations.     Progress on Treatment Objective(s) / Homework:  Satisfactory progress - ACTION (Actively working towards change); Intervened by reinforcing change plan / affirming steps taken    Motivational Interviewing    MI Intervention: Expressed Empathy/Understanding, " Supported Autonomy, Collaboration, Evocation, Open-ended questions and Reframe     Change Talk Expressed by the Patient: Ability to change Reasons to change Need to change    Provider Response to Change Talk: E - Evoked more info from patient about behavior change, A - Affirmed patient's thoughts, decisions, or attempts at behavior change, R - Reflected patient's change talk and S - Summarized patient's change talk statements     Cognitive Behavioral Therapy-- Identification of automatic negative thoughts/core beliefs, Introduction of Cognitive de-fusion and re-structuring techniques, Practice alternative/healthier thoughts and beliefs       Mindfulness-Based Strategies: Discussed skills based on development and application of mindfulness    Also provided psychoeducation about behavioral health condition, symptoms, and treatment options    Care Plan review completed: Yes    Medication Review:  No changes to current psychiatric medication(s)    Medication Compliance:  Yes    Changes in Health Issues:   None reported    Chemical Use Review:   Substance Use: Chemical use reviewed, no active concerns identified      Tobacco Use: No current tobacco use.      Assessment: Current Emotional / Mental Status (status of significant symptoms):  Risk status (Self / Other harm or suicidal ideation)  Patient denies a history of suicidal ideation, suicide attempts, self-injurious behavior, homicidal ideation, homicidal behavior and and other safety concerns  Patient denies current fears or concerns for personal safety.  Patient denies current or recent suicidal ideation or behaviors.  Patient denies current or recent homicidal ideation or behaviors.  Patient denies current or recent self injurious behavior or ideation.  Patient denies other safety concerns.  A safety and risk management plan has not been developed at this time, however patient was encouraged to call [place] [phone] should there be a change in any of these risk  factors.    Appearance:   unable to assess due to telephone visit   Eye Contact:   unable to assess due to telephone visit   Psychomotor Behavior: unable to assess due to telephone visit   Attitude:   Cooperative  Interested Pleasant  Orientation:   All  Speech   Rate / Production: Normal    Volume:  Normal   Mood:    Normal  Affect:    Appropriate   Thought Content:  Clear   Thought Form:  Coherent  Goal Directed  Logical   Insight:    Good     Diagnoses:  1. Generalized anxiety disorder        Collateral Reports Completed:  Not Applicable    Plan: (Homework, other):  Patient was given information about behavioral services and encouraged to schedule a follow up appointment with the clinic [facility] in 3 weeks.  She was also given Cognitive Behavioral Therapy skills to practice when experiencing anxiety.  CD Recommendations: No indications of CD issues.  Mari Levine, [facility] HospitalSW      ______________________________________________________________________    Integrated Primary Care Behavioral Health Treatment Plan    Patient's Name: Liyah Rodriguez  YOB: 1990    Date: 12/17/20    DSM-V Diagnoses: 300.02 (F41.1) Generalized Anxiety Disorder  Psychosocial / Contextual Factors: First time mother, 6 months postpartum during pandemic, works as nurse with limited access to PPE  WHODAS:   WHODAS 2.0 Total Score 12/6/2020   Total Score 12   Total Score MyChart 12       Referral / Collaboration:  Referral to another professional/service is not indicated at this time..    Anticipated number of session or this episode of care: 2-3. Patient stated that overall she is pleased with progress thus far, and would like a few sessions to ensure stable symptoms. Patient aware of provider's model of care and upcoming KAREN. Patient expressed interest in scheduling 1-2 more visits, with referral afterwards if she still identifies a need.       MeasurableTreatment Goal(s) related to diagnosis / functional  impairment(s)  Goal 1: Patient will reduce symptoms of anxiety as evidenced by decreased score on LEEANN 7 from 8 to 5.      I will know I've met my goal when I am less irritable and have fewer racing thoughts about work.      Objective #A (Patient Action)    Patient will use cognitive strategies identified in therapy to challenge anxious thoughts.  Status: New - Date: 12/17/20     Intervention(s)  [facility] will assign homework to practice cognitive restructuring and defusion techniques.    Objective #B  Patient will identify three distraction and diversion activities and use those activities to decrease level of anxiety  .  Status: New - Date: 12/17/20     Intervention(s)  [facility] will teach mindfulness, distraction techniques, and distress tolerance skills.    Objective #C  Patient will use relaxation strategies 2-3 times per day to reduce the physical symptoms of anxiety.  Status: New - Date: 12/17/20     Intervention(s)   [facility] will assign homework to practice relaxation techniques.        Mari Levine, LIBAN  December 17, 2020

## 2020-12-20 ENCOUNTER — HEALTH MAINTENANCE LETTER (OUTPATIENT)
Age: 30
End: 2020-12-20

## 2020-12-21 ASSESSMENT — PATIENT HEALTH QUESTIONNAIRE - PHQ9
SUM OF ALL RESPONSES TO PHQ QUESTIONS 1-9: 4
10. IF YOU CHECKED OFF ANY PROBLEMS, HOW DIFFICULT HAVE THESE PROBLEMS MADE IT FOR YOU TO DO YOUR WORK, TAKE CARE OF THINGS AT HOME, OR GET ALONG WITH OTHER PEOPLE: NOT DIFFICULT AT ALL
SUM OF ALL RESPONSES TO PHQ QUESTIONS 1-9: 4

## 2020-12-21 ASSESSMENT — ANXIETY QUESTIONNAIRES
6. BECOMING EASILY ANNOYED OR IRRITABLE: NOT AT ALL
7. FEELING AFRAID AS IF SOMETHING AWFUL MIGHT HAPPEN: NOT AT ALL
GAD7 TOTAL SCORE: 3
2. NOT BEING ABLE TO STOP OR CONTROL WORRYING: SEVERAL DAYS
1. FEELING NERVOUS, ANXIOUS, OR ON EDGE: SEVERAL DAYS
7. FEELING AFRAID AS IF SOMETHING AWFUL MIGHT HAPPEN: NOT AT ALL
4. TROUBLE RELAXING: NOT AT ALL
GAD7 TOTAL SCORE: 3
3. WORRYING TOO MUCH ABOUT DIFFERENT THINGS: SEVERAL DAYS
5. BEING SO RESTLESS THAT IT IS HARD TO SIT STILL: NOT AT ALL

## 2020-12-21 NOTE — TELEPHONE ENCOUNTER
LEEANN-7 SCORE 11/23/2020 12/6/2020 12/21/2020   Total Score 8 (mild anxiety) 8 (mild anxiety) 3 (minimal anxiety)   Total Score 8 8 3       PHQ 11/23/2020 12/6/2020 12/21/2020   PHQ-9 Total Score 10 5 4   Q9: Thoughts of better off dead/self-harm past 2 weeks Not at all Not at all Not at all     Routing to SL as FYI. Pt states she is feeling better on the medication.   Danae Mota, RN-BSN

## 2020-12-22 ASSESSMENT — ANXIETY QUESTIONNAIRES: GAD7 TOTAL SCORE: 3

## 2020-12-22 ASSESSMENT — PATIENT HEALTH QUESTIONNAIRE - PHQ9: SUM OF ALL RESPONSES TO PHQ QUESTIONS 1-9: 4

## 2021-01-08 ENCOUNTER — VIRTUAL VISIT (OUTPATIENT)
Dept: PSYCHOLOGY | Facility: CLINIC | Age: 31
End: 2021-01-08
Payer: COMMERCIAL

## 2021-01-08 DIAGNOSIS — F41.1 GENERALIZED ANXIETY DISORDER: Primary | ICD-10-CM

## 2021-01-08 PROCEDURE — 90832 PSYTX W PT 30 MINUTES: CPT | Mod: 95 | Performed by: SOCIAL WORKER

## 2021-01-08 NOTE — PROGRESS NOTES
"[facility]  January 8, 2021    Liyah Rodriguez is a 30 year old female who is being evaluated via a telephone visit.      The patient has been notified of the following:     \"We have found that certain health care needs can be provided without the need for a face to face visit.  This service lets us provide the care you need with a short phone conversation.      I will have full access to your [place] medical record during this entire phone call.   I will be taking notes for your medical record.     Since this is like an office visit, we will bill your insurance company for this service.  Please check with your medical insurance if this type of telephone visit/virtual care is covered.  You may be responsible for the cost of this service if insurance coverage is denied.      There are potential benefits and risks of telephone visits (e.g. limits to patient confidentiality) that differ from in-person visits.?  Confidentiality still applies for telephone services, and nobody will record the visit.  It is important to be in a quiet, private space that is free of distractions (including cell phone or other devices) during the visit.??     If during the course of the call I believe a telephone visit is not appropriate, you will not be charged for this service\"    Consent has been obtained for this service by care team member: yes.      Behavioral Health Clinician Progress Note    Patient Name: Liyah Rodriguez           Service Type:  Individual      Service Location:   Phone call (patient / identified key support person reached)     Session Start Time: 2:33 pm  Session End Time: 3:04 pm      Session Length: 38 - 52      Attendees: Patient    Visit Activities (Refresh list every visit): [facility] Only    Diagnostic Assessment Date: 12/2/20  Treatment Plan Review Date: 12/17/20  See Flowsheets for today's PHQ-9 and LEEANN-7 results  Previous PHQ-9:   PHQ-9 SCORE 11/23/2020 " 12/6/2020 12/21/2020   PHQ-9 Total Score MyChart 10 (Moderate depression) 5 (Mild depression) 4 (Minimal depression)   PHQ-9 Total Score 10 5 4     Previous LEEANN-7:   LEEANN-7 SCORE 11/23/2020 12/6/2020 12/21/2020   Total Score 8 (mild anxiety) 8 (mild anxiety) 3 (minimal anxiety)   Total Score 8 8 3       AC LEVEL:  AC Score (Last Two) 11/8/2013   AC Raw Score 51   Activation Score 91.6   AC Level 4       DATA  Extended Session (60+ minutes): No  Interactive Complexity: No  Crisis: No  [facility] Patient: No    Treatment Objective(s) Addressed in This Session:  Target Behavior(s): anxiety    Anxiety: will experience a reduction in anxiety, will develop more effective coping skills to manage anxiety symptoms, will develop healthy cognitive patterns and beliefs and will increase ability to function adaptively    Current Stressors / Issues:  Patient reported that she continues to feel like her symptoms are low grade, and they have returned to baseline. She stated that she feels like her communication and relationship with her  has improved, and she is able to be more present with her daughter. Patient stated that she feels like an increase in anxiety continues to be present on the days that she works, but is comfortable with the symptom level. She stated that she has been starting to incorporate mindfulness into routine on days when anxiety is not present, and continued to explore with patient how to strengthen the practice and begin to incorporate when anxiety is activated.  Introduced a body scan and explored how to associate the body scan with an activity that she is already doing to help serve as a cue to encourage her to remind her to complete the body scan and utilize mindfulness as needed.  Patient stated that she overall feels like emotions and reactions to situations are in proportion to the situation. Reviewed stressors and concerns at time of intake, with patient expressing resolution and/or improvement in  all domains.     Progress on Treatment Objective(s) / Homework:  Stable - MAINTENANCE (Working to maintain change, with risk of relapse); Intervened by continuing to positively reinforce healthy behavior choice     Motivational Interviewing    MI Intervention: Expressed Empathy/Understanding, Supported Autonomy, Collaboration, Evocation, Open-ended questions and Reframe     Change Talk Expressed by the Patient: Ability to change Reasons to change Need to change    Provider Response to Change Talk: E - Evoked more info from patient about behavior change, A - Affirmed patient's thoughts, decisions, or attempts at behavior change, R - Reflected patient's change talk and S - Summarized patient's change talk statements     Cognitive Behavioral Therapy-- Identification of automatic negative thoughts/core beliefs, Introduction of Cognitive de-fusion and re-structuring techniques, Practice alternative/healthier thoughts and beliefs       Mindfulness-Based Strategies: Discussed skills based on development and application of mindfulness    Also provided psychoeducation about behavioral health condition, symptoms, and treatment options    Care Plan review completed: Yes    Medication Review:  No changes to current psychiatric medication(s)    Medication Compliance:  Yes    Changes in Health Issues:   None reported    Chemical Use Review:   Substance Use: Chemical use reviewed, no active concerns identified      Tobacco Use: No current tobacco use.      Assessment: Current Emotional / Mental Status (status of significant symptoms):  Risk status (Self / Other harm or suicidal ideation)  Patient denies a history of suicidal ideation, suicide attempts, self-injurious behavior, homicidal ideation, homicidal behavior and and other safety concerns  Patient denies current fears or concerns for personal safety.  Patient denies current or recent suicidal ideation or behaviors.  Patient denies current or recent homicidal ideation or  behaviors.  Patient denies current or recent self injurious behavior or ideation.  Patient denies other safety concerns.  A safety and risk management plan has not been developed at this time, however patient was encouraged to call [place] [phone] should there be a change in any of these risk factors.    Appearance:   unable to assess due to telephone visit   Eye Contact:   unable to assess due to telephone visit   Psychomotor Behavior: unable to assess due to telephone visit   Attitude:   Cooperative  Interested Pleasant  Orientation:   All  Speech   Rate / Production: Normal    Volume:  Normal   Mood:    Normal  Affect:    Appropriate   Thought Content:  Clear   Thought Form:  Coherent  Goal Directed  Logical   Insight:    Good     Diagnoses:  1. Generalized anxiety disorder        Collateral Reports Completed:  Not Applicable    Plan: (Homework, other):  Patient was given information about behavioral services and encouraged to schedule a follow up appointment with the clinic [facility] as needed.  Patient denied additional questions, concerns, or needs at this time, and will reach out PRN.  She was also given Cognitive Behavioral Therapy skills to practice when experiencing anxiety.  CD Recommendations: No indications of CD issues.  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    Anticipated number of session or this episode of  care: 2-3. Patient stated that overall she is pleased with progress thus far, and would like a few sessions to ensure stable symptoms. Patient aware of provider's model of care and upcoming KAREN. Patient expressed interest in scheduling 1-2 more visits, with referral afterwards if she still identifies a need.       MeasurableTreatment Goal(s) related to diagnosis / functional impairment(s)  Goal 1: Patient will reduce symptoms of anxiety as evidenced by decreased score on LEEANN 7 from 8 to 5.      I will know I've met my goal when I am less irritable and have fewer racing thoughts about work.      Objective #A (Patient Action)    Patient will use cognitive strategies identified in therapy to challenge anxious thoughts.  Status: New - Date: 12/17/20     Intervention(s)  [facility] will assign homework to practice cognitive restructuring and defusion techniques.    Objective #B  Patient will identify three distraction and diversion activities and use those activities to decrease level of anxiety  .  Status: New - Date: 12/17/20     Intervention(s)  [facility] will teach mindfulness, distraction techniques, and distress tolerance skills.    Objective #C  Patient will use relaxation strategies 2-3 times per day to reduce the physical symptoms of anxiety.  Status: New - Date: 12/17/20     Intervention(s)   [facility] will assign homework to practice relaxation techniques.        Mari Levine, LICSW  December 17, 2020

## 2021-06-20 NOTE — LETTER
Letter by Nissen, Lynette, RN at      Author: Nissen, Lynette, RN Service: -- Author Type: --    Filed:  Encounter Date: 6/1/2020 Status: (Other)       6/1/2020        Liyah Rodriguez  [address]  [place] [address]    This letter provides a written record that you were tested for COVID-19 on 5/30/20.     Your result was negative.    This means that we didnt find the virus that causes COVID-19 in your sample. A test may show negative when you do actually have the virus. This can happen when the virus is in the early stages of infection, before you feel illness symptoms.    Even if you dont have symptoms, they may still appear. For safety, its very important to follow these rules.    Keep yourself away from others (self-isolation):      Stay home. Dont go to work, school or anywhere else.     Stay in your own room (and use your own bathroom), if you can.    Stay away from others in your home. No hugging, kissing or shaking hands. No visitors.    Clean high touch surfaces often (doorknobs, counters, handles, etc.). Use a household cleaning spray or wipes.    Cover your mouth and nose with a mask, tissue or washcloth to avoid spreading germs.    Wash your hands and face often with soap and water.    Stay in self-isolation until you meet ALL of the guidelines below:    1. You have had no fever for at least 72 hours (that is 3 full days of no fever without the use of medicine that reduces fevers), AND  2. other symptoms (such as cough, shortness of breath) have gotten better, AND  3. at least 10 days have passed since your symptoms first appeared.    Going back to work  Check with your employer for any guidelines to follow for going back to work.    Employers: This document serves as formal notice that your employee tested negative for COVID-19, as of the testing date shown above.    For questions regarding this letter or your Negative COVID-19 result, call [phone] between 8A to 6:30P (M-F) and 10A to 6:30P  (weekends).

## 2021-09-24 ENCOUNTER — E-VISIT (OUTPATIENT)
Dept: URGENT CARE | Facility: CLINIC | Age: 31
End: 2021-09-24
Payer: COMMERCIAL

## 2021-09-24 DIAGNOSIS — R09.81 NASAL CONGESTION: Primary | ICD-10-CM

## 2021-09-24 PROCEDURE — 99421 OL DIG E/M SVC 5-10 MIN: CPT | Performed by: EMERGENCY MEDICINE

## 2021-09-24 NOTE — PATIENT INSTRUCTIONS
"  Dear Liyah Rodriguez    The symptoms are consistent with a viral upper respiratory infection.  Covid infection must be ruled out.  I have ordered Covid test and a  will call you to set up an appointment.      ter reviewing your responses, I've been able to diagnose you with \"Bronchitis\" which is a common infection of your lungs that is nearly always caused by a virus. The virus causes swelling and irritation of the air passages of your lungs which leads to cough. The illness spreads from your nose and throat to your windpipe and airways. It is often called a \"chest cold\" and can last up to 2 weeks, but is not a serious illness. Exposure to cigarette smoke usually makes this significantly worse.      To treat bronchitis, the main thing to do is drink lots of fluids and rest. Cough medications over-the-counter such as mucinex, robitussin or \"cold and sinus\" medications can be helpful. Ibuprofen and Tylenol also help with fevers or aching feelings that you often have with this kind of illness. Do not take ibuprofen if you have kidney disease, stomach ulcers or allergy to aspirin.     Bronchitis is most often highly contagious as viruses are spread through the air or touch. Avoid contact with others who may become infected, particularly children, the elderly and those whose immune systems might be weak.     If your symptoms worsen, you develop chest pain or shortness of breath, fevers over 101, or are not improving in 5 days, please contact your primary care provider for an appointment or visit any of our convenient Walk-in Care or Urgent Care Centers to be seen which can be found on our website here.    Thanks again for choosing us as your health care partner,    Ady Harman MD  "

## 2021-09-25 ENCOUNTER — LAB (OUTPATIENT)
Dept: URGENT CARE | Facility: URGENT CARE | Age: 31
End: 2021-09-25
Attending: EMERGENCY MEDICINE
Payer: COMMERCIAL

## 2021-09-25 DIAGNOSIS — R09.81 NASAL CONGESTION: ICD-10-CM

## 2021-09-25 LAB — SARS-COV-2 RNA RESP QL NAA+PROBE: NEGATIVE

## 2021-09-25 PROCEDURE — U0003 INFECTIOUS AGENT DETECTION BY NUCLEIC ACID (DNA OR RNA); SEVERE ACUTE RESPIRATORY SYNDROME CORONAVIRUS 2 (SARS-COV-2) (CORONAVIRUS DISEASE [COVID-19]), AMPLIFIED PROBE TECHNIQUE, MAKING USE OF HIGH THROUGHPUT TECHNOLOGIES AS DESCRIBED BY CMS-2020-01-R: HCPCS

## 2021-09-25 PROCEDURE — U0005 INFEC AGEN DETEC AMPLI PROBE: HCPCS

## 2021-10-03 ENCOUNTER — HEALTH MAINTENANCE LETTER (OUTPATIENT)
Age: 31
End: 2021-10-03

## 2021-10-29 ENCOUNTER — E-VISIT (OUTPATIENT)
Dept: FAMILY MEDICINE | Facility: CLINIC | Age: 31
End: 2021-10-29
Payer: COMMERCIAL

## 2021-10-29 ENCOUNTER — OFFICE VISIT (OUTPATIENT)
Dept: URGENT CARE | Facility: URGENT CARE | Age: 31
End: 2021-10-29
Payer: COMMERCIAL

## 2021-10-29 ENCOUNTER — LAB REQUISITION (OUTPATIENT)
Dept: LAB | Facility: CLINIC | Age: 31
End: 2021-10-29

## 2021-10-29 DIAGNOSIS — Z20.822 SUSPECTED COVID-19 VIRUS INFECTION: ICD-10-CM

## 2021-10-29 DIAGNOSIS — Z20.822 SUSPECTED COVID-19 VIRUS INFECTION: Primary | ICD-10-CM

## 2021-10-29 LAB — SARS-COV-2 RNA RESP QL NAA+PROBE: NEGATIVE

## 2021-10-29 PROCEDURE — U0003 INFECTIOUS AGENT DETECTION BY NUCLEIC ACID (DNA OR RNA); SEVERE ACUTE RESPIRATORY SYNDROME CORONAVIRUS 2 (SARS-COV-2) (CORONAVIRUS DISEASE [COVID-19]), AMPLIFIED PROBE TECHNIQUE, MAKING USE OF HIGH THROUGHPUT TECHNOLOGIES AS DESCRIBED BY CMS-2020-01-R: HCPCS | Performed by: INTERNAL MEDICINE

## 2021-10-29 PROCEDURE — 99421 OL DIG E/M SVC 5-10 MIN: CPT | Performed by: PHYSICIAN ASSISTANT

## 2021-10-29 NOTE — PATIENT INSTRUCTIONS
Dear Liyah Rodriguez,    Your symptoms show that you may have coronavirus (COVID-19). This illness can cause fever, cough and trouble breathing. Many people get a mild case and get better on their own. Some people can get very sick.    Will I be tested for COVID-19?  We would like to test you for Covid-19 virus. I have placed orders for this test.     For all employees or close contacts (except [facility] and [employer] - see below), go to your [employer] home page and scroll down to the section that says  You have an appointment that needs to be scheduled  and click the large green button that says  Schedule Now  and follow the steps to find the next available opening.     If you are unable to complete these steps or if you cannot find any available times, please call [phone] to schedule employee testing.     [facility] employees or close contacts, please call [phone].   [facility] [employer]) employees or close contacts call [phone].    Return to work/school/ guidance:  Please let your workplace manager and staffing office know when your quarantine ends     We can t give you an exact date as it depends on the above. You can calculate this on your own or work with your manager/staffing office to calculate this. (For example if you were exposed on 10/4, you would have to quarantine for 14 full days. That would be through 10/18. You could return on 10/19.)      If you receive a positive COVID-19 test result, follow the guidance of the those who are giving you the results. Usually the return to work is 10 (or in some cases 20 days from symptom onset.) If you work at [employer], you must also be cleared by Employee Occupational Health and Safety to return to work.        If you receive a negative COVID-19 test result and did not have a high risk exposure to someone with a known positive COVID-19 test, you can return to work once you're free of fever for 24 hours without fever-reducing  medication and your symptoms are improving or resolved.      If you receive a negative COVID-19 test and If you had a high risk exposure to someone who has tested positive for COVID-19 then you can return to work 14 days after your last contact with the positive individual    Note: If you have ongoing exposure to the covid positive person, this quarantine period may be more than 14 days. (For example, if you are continued to be exposed to your child who tested positive and cannot isolate from them, then the quarantine of 7-14 days can't start until your child is no longer contagious. This is typically 10 days from onset of the child's symptoms. So the total duration may be 17-24 days in this case.)    Sign up for [employer].   We know it's scary to hear that you might have COVID-19. We want to track your symptoms to make sure you're okay over the next 2 weeks. Please look for an email from [employer]--this is a free, online program that we'll use to keep in touch. To sign up, follow the link in the email you will receive. Learn more at [email or web address]    How can I take care of myself?    Get lots of rest. Drink extra fluids (unless a doctor has told you not to)    Take Tylenol (acetaminophen) or ibuprofen for fever or pain. If you have liver or kidney problems, ask your family doctor if it's okay to take Tylenol o ibuprofen    If you have other health problems (like cancer, heart failure, an organ transplant or severe kidney disease): Call your specialty clinic if you don't feel better in the next 2 days.    Know when to call 911. Emergency warning signs include:  o Trouble breathing or shortness of breath  o Pain or pressure in the chest that doesn't go away  o Feeling confused like you haven't felt before, or not being able to wake up  o Bluish-colored lips or face    Where can I get more information?  [facility] - About COVID-19:   [email or web address]    CDC - What to Do  If You're Sick:   www.cdc.gov/coronavirus/2019-ncov/about/steps-when-sick.html

## 2021-11-11 NOTE — PROGRESS NOTES
SUBJECTIVE:  Liyah Rodriguez is a 30 year old female  here for a postpartum visit.  She had a  on 6/3/20 delivering a healthy baby girl weighing 6 lbs 10 oz at term.      PHQ 9=2 today    delivery complications:  No  breast feeding:  Yes, going well but has renayds of the nipple  bladder problems:  Yes, occ loss of urine with sneeze  bowel problems/hemorrhoids:  No  episiotomy/laceration/incision healed? Yes  vaginal flow:  None  Orosi:  No  contraception:  condoms  emotional adjustment:  doing well and happy  back to work:  Early october    OBJECTIVE:  currently breastfeeding.     Phone visit for covid-total time 14 min    ASSESSMENT:  normal postpartum exam    PLAN:  Discussed kegel exercises --doing them now  May resume normal activities without restrictions  Pap smear was not  done today    The patient will use condoms for birth control. Full counseling was provided, and all questions answered. Compliance is strongly emphasized.  Return to clinic in one year for an annual and when due for a pap smear. (due now but no history of abnormal and prefers to do annual next year)    Will start nifedipine 30 mg daily for ranaud's of nipples.     JYOTI FRANKLIN MD   What Type Of Note Output Would You Prefer (Optional)?: Standard Output Is This A New Presentation, Or A Follow-Up?: Skin Lesion Additional History: This site has been scraped off in the past as well as biopsied. 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 Lesion has returned and patient wants to discuss her options for getting rid of it.

## 2021-12-20 ENCOUNTER — E-VISIT (OUTPATIENT)
Dept: FAMILY MEDICINE | Facility: CLINIC | Age: 31
End: 2021-12-20
Payer: COMMERCIAL

## 2021-12-20 DIAGNOSIS — Z20.822 SUSPECTED COVID-19 VIRUS INFECTION: Primary | ICD-10-CM

## 2021-12-20 PROCEDURE — 99421 OL DIG E/M SVC 5-10 MIN: CPT | Performed by: PHYSICIAN ASSISTANT

## 2021-12-20 NOTE — PATIENT INSTRUCTIONS
Dear Liyah Rodriguez,    Your symptoms show that you may have coronavirus (COVID-19). This illness can cause fever, cough and trouble breathing. Many people get a mild case and get better on their own. Some people can get very sick.    Will I be tested for COVID-19?  We would like to test you for Covid-19 virus. I have placed orders for this test.     For all employees or close contacts (except [place] and [employer] - see below), go to your [employer] home page and scroll down to the section that says  You have an appointment that needs to be scheduled  and click the large green button that says  Schedule Now  and follow the steps to find the next available opening.     If you are unable to complete these steps or if you cannot find any available times, please call [phone] to schedule employee testing.     [employer] [facility] employees or close contacts, please call [phone].   [place] ([employer]) employees or close contacts call [phone].    Return to work/school/ guidance:  Please let your workplace manager and staffing office know when your quarantine ends     We can t give you an exact date as it depends on the above. You can calculate this on your own or work with your manager/staffing office to calculate this. (For example if you were exposed on 10/4, you would have to quarantine for 14 full days. That would be through 10/18. You could return on 10/19.)      If you receive a positive COVID-19 test result, follow the guidance of the those who are giving you the results. Usually the return to work is 10 (or in some cases 20 days from symptom onset.) If you work at [employer], you must also be cleared by Employee Occupational Health and Safety to return to work.        If you receive a negative COVID-19 test result and did not have a high risk exposure to someone with a known positive COVID-19 test, you can return to work once you're free of fever for 24 hours without fever-reducing  medication and your symptoms are improving or resolved.      If you receive a negative COVID-19 test and If you had a high risk exposure to someone who has tested positive for COVID-19 then you can return to work 14 days after your last contact with the positive individual    Note: If you have ongoing exposure to the covid positive person, this quarantine period may be more than 14 days. (For example, if you are continued to be exposed to your child who tested positive and cannot isolate from them, then the quarantine of 7-14 days can't start until your child is no longer contagious. This is typically 10 days from onset of the child's symptoms. So the total duration may be 17-24 days in this case.)    Sign up for [employer].   We know it's scary to hear that you might have COVID-19. We want to track your symptoms to make sure you're okay over the next 2 weeks. Please look for an email from [employer]--this is a free, online program that we'll use to keep in touch. To sign up, follow the link in the email you will receive. Learn more at [email or web address]    How can I take care of myself?    Get lots of rest. Drink extra fluids (unless a doctor has told you not to)    Take Tylenol (acetaminophen) or ibuprofen for fever or pain. If you have liver or kidney problems, ask your family doctor if it's okay to take Tylenol o ibuprofen    If you have other health problems (like cancer, heart failure, an organ transplant or severe kidney disease): Call your specialty clinic if you don't feel better in the next 2 days.    Know when to call 911. Emergency warning signs include:  o Trouble breathing or shortness of breath  o Pain or pressure in the chest that doesn't go away  o Feeling confused like you haven't felt before, or not being able to wake up  o Bluish-colored lips or face    Where can I get more information?  [facility] - About COVID-19:   [email or web address]    CDC - What to Do  If You're Sick:   www.cdc.gov/coronavirus/2019-ncov/about/steps-when-sick.html

## 2021-12-21 ENCOUNTER — LAB (OUTPATIENT)
Dept: URGENT CARE | Facility: URGENT CARE | Age: 31
End: 2021-12-21
Attending: PHYSICIAN ASSISTANT
Payer: COMMERCIAL

## 2021-12-21 DIAGNOSIS — Z20.822 SUSPECTED COVID-19 VIRUS INFECTION: ICD-10-CM

## 2021-12-21 LAB — SARS-COV-2 RNA RESP QL NAA+PROBE: NEGATIVE

## 2021-12-21 PROCEDURE — U0005 INFEC AGEN DETEC AMPLI PROBE: HCPCS

## 2021-12-21 PROCEDURE — U0003 INFECTIOUS AGENT DETECTION BY NUCLEIC ACID (DNA OR RNA); SEVERE ACUTE RESPIRATORY SYNDROME CORONAVIRUS 2 (SARS-COV-2) (CORONAVIRUS DISEASE [COVID-19]), AMPLIFIED PROBE TECHNIQUE, MAKING USE OF HIGH THROUGHPUT TECHNOLOGIES AS DESCRIBED BY CMS-2020-01-R: HCPCS

## 2021-12-30 ENCOUNTER — MYC MEDICAL ADVICE (OUTPATIENT)
Dept: OBGYN | Facility: CLINIC | Age: 31
End: 2021-12-30
Payer: COMMERCIAL

## 2021-12-30 ASSESSMENT — PATIENT HEALTH QUESTIONNAIRE - PHQ9
SUM OF ALL RESPONSES TO PHQ QUESTIONS 1-9: 7
10. IF YOU CHECKED OFF ANY PROBLEMS, HOW DIFFICULT HAVE THESE PROBLEMS MADE IT FOR YOU TO DO YOUR WORK, TAKE CARE OF THINGS AT HOME, OR GET ALONG WITH OTHER PEOPLE: NOT DIFFICULT AT ALL
SUM OF ALL RESPONSES TO PHQ QUESTIONS 1-9: 7

## 2021-12-30 ASSESSMENT — ANXIETY QUESTIONNAIRES
7. FEELING AFRAID AS IF SOMETHING AWFUL MIGHT HAPPEN: NOT AT ALL
GAD7 TOTAL SCORE: 8
5. BEING SO RESTLESS THAT IT IS HARD TO SIT STILL: SEVERAL DAYS
7. FEELING AFRAID AS IF SOMETHING AWFUL MIGHT HAPPEN: NOT AT ALL
6. BECOMING EASILY ANNOYED OR IRRITABLE: MORE THAN HALF THE DAYS
4. TROUBLE RELAXING: SEVERAL DAYS
1. FEELING NERVOUS, ANXIOUS, OR ON EDGE: SEVERAL DAYS
2. NOT BEING ABLE TO STOP OR CONTROL WORRYING: SEVERAL DAYS
3. WORRYING TOO MUCH ABOUT DIFFERENT THINGS: MORE THAN HALF THE DAYS
GAD7 TOTAL SCORE: 8

## 2021-12-31 ENCOUNTER — E-VISIT (OUTPATIENT)
Dept: URGENT CARE | Facility: CLINIC | Age: 31
End: 2021-12-31
Payer: COMMERCIAL

## 2021-12-31 DIAGNOSIS — Z20.822 SUSPECTED COVID-19 VIRUS INFECTION: Primary | ICD-10-CM

## 2021-12-31 PROCEDURE — 99421 OL DIG E/M SVC 5-10 MIN: CPT | Performed by: PHYSICIAN ASSISTANT

## 2021-12-31 ASSESSMENT — PATIENT HEALTH QUESTIONNAIRE - PHQ9: SUM OF ALL RESPONSES TO PHQ QUESTIONS 1-9: 7

## 2021-12-31 ASSESSMENT — ANXIETY QUESTIONNAIRES: GAD7 TOTAL SCORE: 8

## 2021-12-31 NOTE — TELEPHONE ENCOUNTER
Patient MyChart message to clinic requesting refill for Sertraline. Appointment scheduled for 1/4. Sent questionnaires, scored 7 on PHQ-9 and 8 on LEEANN-7. Provider must refill per RN refill protocol. Order started.  Jeannie Horner RN

## 2021-12-31 NOTE — PATIENT INSTRUCTIONS
Liyah,    Based on your responses, you may have coronavirus (COVID-19). This illness can cause fever, cough and trouble breathing. Many people get a mild case and get better on their own. Some people can get very sick.    Will I be tested for COVID-19?  We would like to test you for COVID-19 virus. I have placed orders for this test.     For all employees or close contacts (except [facility] [place] and [employer] - see below), go to your [employer] home page and scroll down to the section that says  You have an appointment that needs to be scheduled  and click the large green button that says  Schedule Now  and follow the steps to find the next available opening.     If you are unable to complete these steps or if you cannot find any available times, please call [phone] to schedule employee testing.     [facility] employees or close contacts, please call [phone].   [place] ([employer]) employees or close contacts call [phone].    Return to work/school/ guidance:  Please let your workplace manager and staffing office know when your isolation ends.       If you receive a positive COVID-19 test result, follow the guidance of the those who are giving you the results. Usually the return to work is 10 days from symptom onset or positive test date, (or in some cases 20 days if you are immunocompromised). If your symptoms started after your positive test, the 10 days should start when your symptoms started.   o If you work at [employer], you must also be cleared by Employee Occupational Health and Safety to return to work.      If you receive a negative COVID-19 test result and did not have a high risk exposure to someone with a known positive COVID-19 test, you can return to work once you're free of fever for 24 hours without fever-reducing medication and your symptoms are improving or resolved.    If you receive a negative COVID-19 test and had a high-risk exposure to someone who has tested positive for  COVID-19 then you can return to work 14 days after your last contact with the positive individual. Follow quarantine guidance given by your doctor or public health officials.     Sign up for [employer].   We know it's scary to hear that you might have COVID-19. We want to track your symptoms to make sure you're okay over the next 2 weeks. Please look for an email from [employer]--this is a free, online program that we'll use to keep in touch. To sign up, follow the link in the email you will receive. Learn more at [email or web address]        How can I take care of myself?  Over the counter medications may help with your symptoms like congestion, cough, chills, or fever.     There are not many effective prescription treatments for early COVID-19. Hydroxychloroquine, ivermectin, and azithromycin are not effective or recommended for COVID-19.    If your symptoms started in the last 10 days, you may be able to receive a treatment with monoclonal antibodies. This treatment can lower your risk of severe illness and going to the hospital. It is given through an IV or under your skin (subcutaneous) and must be given at an infusion center. You must be 12 or older, weight at least 88 pounds, and have a positive COVID-19 test.    If you would like to sign up to be considered to receive the monoclonal antibody medicine, please complete a participation form through the [facility] here: MNRAP ([email or web address]). You may also call the [facility] COVID-19 Public Hotline at 1-738.655.5911 (open Mon-Fri: 9am-7pm and Sat: 10am-6pm).     Not all people who are eligible will receive the medicine, since supply is limited. You will be contacted in the next 1 to 2 business days only if you are selected. If you do not receive a call, you have not been selected to receive the medicine. If you have any questions about this medication, please contact your primary care  provider. For more information, see [email or web address]/[email or web address]      Get lots of rest. Drink extra fluids (unless a doctor has told you not to)    Take Tylenol (acetaminophen) or ibuprofen for fever or pain. If you have liver or kidney problems, ask your family doctor if it's okay to take Tylenol o ibuprofen    Take over the counter medications for your symptoms, as directed by your doctor. You may also talk to your pharmacist.      If you have other health problems (like cancer, heart failure, an organ transplant or severe kidney disease): Call your specialty clinic if you don't feel better in the next 2 days.    Know when to call 911. Emergency warning signs include:  o Trouble breathing or shortness of breath  o Pain or pressure in the chest that doesn't go away  o Feeling confused like you haven't felt before, or not being able to wake up  o Bluish-colored lips or face    Where can I get more information?    [facility] - About COVID-19: [email or web address]     CDC - What to Do If You're Sick:     www.cdc.gov/coronavirus/2019-ncov/about/steps-when-sick.html    CDC - Ending Home Isolation:  https://www.cdc.gov/coronavirus/2019-ncov/your-health/quarantine-isolation.html     CDC - Caring for Someone:  www.cdc.gov/coronavirus/2019-ncov/if-you-are-sick/care-for-someone.html    [facility] clinical trials (COVID-19 research studies): clinicalaffairs.[facility]-clinical-trials    Below are the COVID-19 hotlines at the [facility] ([facility]). Interpreters are available.  o For health questions: Call 598-969-2701 or 1-615.611.8672 (7 a.m. to 7 p.m.)  o For questions about schools and childcare: Call 770-356-0261 or 1-650.107.4811 (7 a.m. to 7 p.m.)  December 31, 2021  RE:  Liyah Rodriguez                                                                                                                  [address]  [id number]      To whom it may concern:    I evaluated Liyah Rodriguez on December 31, 2021. Liyah Rodriguez should be excused from work/school.     They should let their workplace manager and staffing office know when their quarantine ends.    We can not give an exact date as it depends on the information below. They can calculate this on their own or work with their manager/staffing office to calculate this. (For example if they were exposed on 10/04, they would have to quarantine for 14 full days. That would be through 10/18. They could return on 10/19.)    Quarantine Guidelines:    If patient receives a positive COVID-19 test result, they should follow the guidance of those who are giving the results. Usually the return to work is 10 (or in some cases 20 days from symptom onset.) If they work at [employer], they must be cleared by Employee Occupational Health and Safety to return to work.      If patient receives a negative COVID-19 test result and did not have a high risk exposure to someone with a known positive COVID-19 test, they can return to work once they're free of fever for 24 hours without fever-reducing medication and their symptoms are improving or resolved.    If patient receives a negative COVID-19 test and if they had a high risk exposure to someone who has tested positive for COVID-19 then they can return to work 14 days after their last contact with the positive individual    Note: If there is ongoing exposure to the covid positive person, this quarantine period may be longer than 14 days. (For example, if they are continually exposed to their child, who tested positive and cannot isolate from them, then the quarantine of 7-14 days can't start until their child is no longer contagious. This is typically 10 days from onset to the child's symptoms. So the total duration may be 17-24 days in this case.)     Sincerely,  Deedee Olguin PA-C          o

## 2022-01-04 ENCOUNTER — LAB (OUTPATIENT)
Dept: URGENT CARE | Facility: URGENT CARE | Age: 32
End: 2022-01-04
Attending: PHYSICIAN ASSISTANT
Payer: COMMERCIAL

## 2022-01-04 DIAGNOSIS — Z20.822 SUSPECTED COVID-19 VIRUS INFECTION: ICD-10-CM

## 2022-01-04 PROCEDURE — U0005 INFEC AGEN DETEC AMPLI PROBE: HCPCS

## 2022-01-04 PROCEDURE — 99207 PR NO CHARGE LOS: CPT

## 2022-01-04 PROCEDURE — U0003 INFECTIOUS AGENT DETECTION BY NUCLEIC ACID (DNA OR RNA); SEVERE ACUTE RESPIRATORY SYNDROME CORONAVIRUS 2 (SARS-COV-2) (CORONAVIRUS DISEASE [COVID-19]), AMPLIFIED PROBE TECHNIQUE, MAKING USE OF HIGH THROUGHPUT TECHNOLOGIES AS DESCRIBED BY CMS-2020-01-R: HCPCS

## 2022-01-05 LAB — SARS-COV-2 RNA RESP QL NAA+PROBE: POSITIVE

## 2022-02-04 ENCOUNTER — OFFICE VISIT (OUTPATIENT)
Dept: OBGYN | Facility: CLINIC | Age: 32
End: 2022-02-04
Payer: COMMERCIAL

## 2022-02-04 VITALS
DIASTOLIC BLOOD PRESSURE: 90 MMHG | HEART RATE: 87 BPM | BODY MASS INDEX: 35.74 KG/M2 | SYSTOLIC BLOOD PRESSURE: 128 MMHG | OXYGEN SATURATION: 99 % | HEIGHT: 63 IN | WEIGHT: 201.7 LBS

## 2022-02-04 DIAGNOSIS — N92.6 IRREGULAR MENSES: ICD-10-CM

## 2022-02-04 DIAGNOSIS — Z13.29 SCREENING FOR THYROID DISORDER: ICD-10-CM

## 2022-02-04 DIAGNOSIS — Z12.4 PAP SMEAR FOR CERVICAL CANCER SCREENING: ICD-10-CM

## 2022-02-04 DIAGNOSIS — Z13.220 SCREENING FOR LIPID DISORDERS: ICD-10-CM

## 2022-02-04 DIAGNOSIS — Z11.59 NEED FOR HEPATITIS C SCREENING TEST: ICD-10-CM

## 2022-02-04 DIAGNOSIS — Z01.419 ENCOUNTER FOR GYNECOLOGICAL EXAMINATION WITHOUT ABNORMAL FINDING: Primary | ICD-10-CM

## 2022-02-04 DIAGNOSIS — Z13.0 SCREENING, ANEMIA, DEFICIENCY, IRON: ICD-10-CM

## 2022-02-04 PROCEDURE — G0145 SCR C/V CYTO,THINLAYER,RESCR: HCPCS | Performed by: OBSTETRICS & GYNECOLOGY

## 2022-02-04 PROCEDURE — 99395 PREV VISIT EST AGE 18-39: CPT | Performed by: OBSTETRICS & GYNECOLOGY

## 2022-02-04 PROCEDURE — 87624 HPV HI-RISK TYP POOLED RSLT: CPT | Performed by: OBSTETRICS & GYNECOLOGY

## 2022-02-04 ASSESSMENT — MIFFLIN-ST. JEOR: SCORE: 1599.04

## 2022-02-04 NOTE — PATIENT INSTRUCTIONS
Make lab only appointment fasting (nothing but water 10 hours prior to blood draw) at any [facility].   cycle day 3 if possible

## 2022-02-04 NOTE — PROGRESS NOTES
Liyah is a 31 year old  female who presents for annual exam.     Menses are regular q 28-30 days and normal lasting 4 days.  Menses flow: normal.  Patient's last menstrual period was 2022.. Using none for contraception.  She is currently considering pregnancy.  Besides routine health maintenance, she has no other health concerns today . Daughter is 20 months now but still breastfeeding 1-2 times daily.  Not trying for another baby but not preventing either.  Would like to check labs in the future but they have decided they will not pursue IVF.  Mood doing well on the Zoloft and would like a refill.  GYNECOLOGIC HISTORY:  Menarche:   Liyah is sexually active with 1male partner(s) and is currently in monogamous relationship.    History sexually transmitted infections:No STD history  STI testing offered?  Declined  RAÚL exposure: Unknown  History of abnormal Pap smear: NO - age 30- 65 PAP every 3 years recommended  Family history of breast CA: No  Family history of uterine/ovarian CA: No    Family history of colon CA: No    HEALTH MAINTENANCE:  Cholesterol: (  Cholesterol   Date Value Ref Range Status   2015 145 <200 mg/dL Final   2014 140 <200 mg/dL Final     Comment:     LDL Cholesterol is the primary guide to therapy.   The NCEP recommends further evaluation of: patients with cholesterol greater   than 200 mg/dL if additional risk factors are present, cholesterol greater   than   240 mg/dL, triglycerides greater than 150 mg/dL, or HDL less than 40 mg/dL.      History of abnormal lipids: No  Mammo: na . History of abnormal Mammo: No.  Regular Self Breast Exams: yes  Calcium/Vitamin D intake: source:  dairy, vit d Adequate? Yes  TSH: (  TSH   Date Value Ref Range Status   2020 2.58 0.40 - 4.00 mU/L Final    )  Pap; (  Lab Results   Component Value Date    PAP NIL 2017    PAP NIL 2013    )    HISTORY:  OB History    Para Term  AB Living   2 1 1 0 1 1   SAB IAB  Ectopic Multiple Live Births   1 0 0 0 1      # Outcome Date GA Lbr Ian/2nd Weight Sex Delivery Anes PTL Lv   2 Term 06/03/20 38w4d 07:00 / 00:44 3.01 kg (6 lb 10.2 oz) F Vag-Spont EPI N MIGUEL      Complications: Preeclampsia/Hypertension      Name: Rdaha      Apgar1: 9  Apgar5: 9   1 SAB 05/24/19 9w5d    AB, COMPLETE         Birth Comments: D&C     Past Medical History:   Diagnosis Date     NO ACTIVE PROBLEMS      Past Surgical History:   Procedure Laterality Date     ACL repair  06/2008     DILATION AND CURETTAGE SUCTION N/A 5/24/2019    Procedure: Suction Dilation and Curettage   (9 Weeks and 5 Days);  Surgeon: Coni Barnett MD;  Location: [facility]     ORTHOPEDIC SURGERY Right     knee surgery     Right lateral menisectomy  06/2008     XR HYSTEROSALPINGOGRAM  02/08/2019    normal fill and spill     Family History   Problem Relation Age of Onset     Heart Disease Paternal Grandmother         CHF     Hypertension Mother      Hypertension Maternal Grandmother      High cholesterol Maternal Grandmother      Cancer Maternal Grandfather         lung cancer     Cancer Father 50        lymphoma     Aneurysm Father 63        spotty vision, occasional weakness prior- then sudden death, smoker     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[employer]   Tobacco Use     Smoking status: Never Smoker     Smokeless tobacco: Never Used   Substance and Sexual Activity     Alcohol use: Not on file     Comment: occ     Drug use: Not on file     Sexual activity: Yes     Partners: Male     Birth control/protection: None   Other Topics Concern     Not on file   Social History Narrative     Not on file     Social Determinants of Health     Financial Resource Strain: Not on file   Food Insecurity: Not on file  "  Transportation Needs: Not on file   Physical Activity: Not on file   Stress: Not on file   Social Connections: Not on file   Intimate Partner Violence: Not on file   Housing Stability: Not on file       Current Outpatient Medications:      acetaminophen (TYLENOL) 325 MG tablet, Take 2 tablets (650 mg) by mouth every 6 hours as needed for mild pain Start after Delivery., Disp: 100 tablet, Rfl: 0     ibuprofen (ADVIL/MOTRIN) 600 MG tablet, Take 1 tablet (600 mg) by mouth every 6 hours as needed for moderate pain Start after delivery, Disp: 60 tablet, Rfl: 0     sertraline (ZOLOFT) 50 MG tablet, Take 1 tablet (50 mg) by mouth daily (Patient not taking: Reported on 2/4/2022), Disp: 90 tablet, Rfl: 1     sertraline (ZOLOFT) 50 MG tablet, Take 1 tablet (50 mg) by mouth daily Take 1/2 tab for first week, then increase to 1 tab daily, Disp: 90 tablet, Rfl: 3     VITAMIN D PO, , Disp: , Rfl:      Allergies   Allergen Reactions     Nkda [No Known Drug Allergies]        Past medical, surgical, social and family history were reviewed and updated in EPIC.    EXAM:  BP (!) 151/88   Pulse 87   Ht 1.6 m (5' 3\")   Wt 91.5 kg (201 lb 11.2 oz)   LMP 01/13/2022   SpO2 99%   Breastfeeding Yes   BMI 35.73 kg/m     BMI: Body mass index is 35.73 kg/m .  Constitutional: healthy, alert and no distress  Head: Normocephalic. No masses, lesions, tenderness or abnormalities  Neck: Neck supple. Trachea midline. No adenopathy. Thyroid symmetric, normal size.   Cardiovascular: RRR.   Respiratory: Negative.   Breast: Breasts reveal mild symmetric fibrocystic densities, but there are no dominant, discrete, fixed or suspicious masses found.  Gastrointestinal: Abdomen soft, non-tender, non-distended. No masses, organomegaly.  :  Vulva:  No external lesions, normal female hair distribution, no inguinal adenopathy.    Urethra:  Midline, non-tender, well supported, no discharge  Vagina:  Moist, pink, no abnormal discharge, no lesions  Uterus:  " Normal size , non-tender, freely mobile  Ovaries:  No masses appreciated, non-tender, mobile  Rectal Exam: deferred  Musculoskeletal: extremities normal  Skin: no suspicious lesions or rashes  Psychiatric: Affect appropriate, cooperative,mentation appears normal.     COUNSELING:   Reviewed preventive health counseling, as reflected in patient instructions       Consider Hep C screening for all patients one time for ages 18-79 years   reports that she has never smoked. She has never used smokeless tobacco.    Body mass index is 35.73 kg/m .  Weight management plan: has been losing weight recently   FRAX Risk Assessment    ASSESSMENT:  31 year old female with satisfactory annual exam  (Z01.419) Encounter for gynecological examination without abnormal finding  (primary encounter diagnosis)  Comment: no birthcontrol  Plan: Okay with pregnancy if it were to happen.    (Z12.4) Pap smear for cervical cancer screening  Plan: Pap imaged thin layer screen with HPV -         recommended age 30 - 65 years (select HPV order        below)        Discussed sending pap smear for HPV typing as well and that if both pap and HPV are negative, then can have q5 year pap smears.    (N92.6) Irregular menses  Comment: h/o trouble conceiving   Plan: Follicle stimulating hormone, Luteinizing         Hormone, Adult, Estradiol, Anti-Mullerian         hormone        Discussed getting cycle day 3 labs after 2-3 months after stops breast-feeding    (Z13.0) Screening, anemia, deficiency, iron  Plan: CBC with platelets        RTC for lab    (Z13.220) Screening for lipid disorders  Plan: Lipid panel reflex to direct LDL Fasting        RTC fasting for lab    (Z13.29) Screening for thyroid disorder  Plan: TSH with free T4 reflex        RTC for lab    (Z11.59) Need for hepatitis C screening test  Comment: per [facility]  Plan: Hepatitis C antibody        RTC for lab        Yvonne Reese MD

## 2022-02-09 LAB
BKR LAB AP GYN ADEQUACY: NORMAL
BKR LAB AP GYN INTERPRETATION: NORMAL
BKR LAB AP HPV REFLEX: NORMAL
BKR LAB AP PREVIOUS ABNORMAL: NORMAL
PATH REPORT.COMMENTS IMP SPEC: NORMAL
PATH REPORT.COMMENTS IMP SPEC: NORMAL
PATH REPORT.RELEVANT HX SPEC: NORMAL

## 2022-02-11 ENCOUNTER — OFFICE VISIT (OUTPATIENT)
Dept: URGENT CARE | Facility: URGENT CARE | Age: 32
End: 2022-02-11
Payer: COMMERCIAL

## 2022-02-11 VITALS
DIASTOLIC BLOOD PRESSURE: 86 MMHG | OXYGEN SATURATION: 99 % | TEMPERATURE: 99.1 F | SYSTOLIC BLOOD PRESSURE: 134 MMHG | HEART RATE: 68 BPM

## 2022-02-11 DIAGNOSIS — T19.2XXA VAGINAL FOREIGN BODY, INITIAL ENCOUNTER: Primary | ICD-10-CM

## 2022-02-11 LAB
HUMAN PAPILLOMA VIRUS 16 DNA: NEGATIVE
HUMAN PAPILLOMA VIRUS 18 DNA: NEGATIVE
HUMAN PAPILLOMA VIRUS FINAL DIAGNOSIS: NORMAL
HUMAN PAPILLOMA VIRUS OTHER HR: NEGATIVE

## 2022-02-11 PROCEDURE — 99213 OFFICE O/P EST LOW 20 MIN: CPT | Performed by: FAMILY MEDICINE

## 2022-02-11 NOTE — PROGRESS NOTES
Assessment & Plan     Vaginal foreign body, initial encounter  Patient was concerned for retained tampon, but no vaginal foreign body was found on bimanual and speculum exam, and all areas were well-visualized. Patient thinks it may have come out last night into toilet without realizing it. Return for pain or abnormal discharge.                    No follow-ups on file.    Jaylin Yip MD  [facility] Pelletier is a 31 year old who presents for the following health issues     HPI   Chief Complaint   Patient presents with     Urgent Care     Foreign Body     patient states she was removing a tampon last night and it broke in half- unable to retrieve it      Removed a tampon last night and it appeared the string and bottom portion of the tampon broke off. She did not see the rest of the tampon, and she cannot feel it in her vagina or find it to remove it. No pain, no abnormal discharge.         Review of Systems         Objective    /86   Pulse 68   Temp 99.1  F (37.3  C) (Tympanic)   LMP 01/13/2022   SpO2 99%   Breastfeeding Yes   There is no height or weight on file to calculate BMI.  Physical Exam   Gen: NAD  Pelvis: Normal external genitalia. Speculum exam with a clear speculum provided a good view of entire vaginal vault, all vaginal walls, and around the cervix, and no foreign body was seen. Bimanual exam confirms no foreign body.

## 2022-06-12 ENCOUNTER — OFFICE VISIT (OUTPATIENT)
Dept: URGENT CARE | Facility: URGENT CARE | Age: 32
End: 2022-06-12
Payer: COMMERCIAL

## 2022-06-12 VITALS
HEART RATE: 84 BPM | SYSTOLIC BLOOD PRESSURE: 128 MMHG | RESPIRATION RATE: 16 BRPM | OXYGEN SATURATION: 99 % | TEMPERATURE: 98.8 F | DIASTOLIC BLOOD PRESSURE: 92 MMHG

## 2022-06-12 DIAGNOSIS — Z20.822 SUSPECTED 2019 NOVEL CORONAVIRUS INFECTION: ICD-10-CM

## 2022-06-12 DIAGNOSIS — H66.93 BILATERAL OTITIS MEDIA, UNSPECIFIED OTITIS MEDIA TYPE: Primary | ICD-10-CM

## 2022-06-12 DIAGNOSIS — R05.9 COUGH: ICD-10-CM

## 2022-06-12 LAB — SARS-COV-2 RNA RESP QL NAA+PROBE: NEGATIVE

## 2022-06-12 PROCEDURE — U0003 INFECTIOUS AGENT DETECTION BY NUCLEIC ACID (DNA OR RNA); SEVERE ACUTE RESPIRATORY SYNDROME CORONAVIRUS 2 (SARS-COV-2) (CORONAVIRUS DISEASE [COVID-19]), AMPLIFIED PROBE TECHNIQUE, MAKING USE OF HIGH THROUGHPUT TECHNOLOGIES AS DESCRIBED BY CMS-2020-01-R: HCPCS | Performed by: PHYSICIAN ASSISTANT

## 2022-06-12 PROCEDURE — 99214 OFFICE O/P EST MOD 30 MIN: CPT | Performed by: PHYSICIAN ASSISTANT

## 2022-06-12 PROCEDURE — U0005 INFEC AGEN DETEC AMPLI PROBE: HCPCS | Performed by: PHYSICIAN ASSISTANT

## 2022-06-12 RX ORDER — ALBUTEROL SULFATE 90 UG/1
2-4 AEROSOL, METERED RESPIRATORY (INHALATION) EVERY 4 HOURS PRN
Qty: 18 G | Refills: 0 | Status: SHIPPED | OUTPATIENT
Start: 2022-06-12 | End: 2023-05-02

## 2022-06-12 RX ORDER — AMOXICILLIN 875 MG
875 TABLET ORAL 2 TIMES DAILY
Qty: 20 TABLET | Refills: 0 | Status: SHIPPED | OUTPATIENT
Start: 2022-06-12 | End: 2022-06-22

## 2022-06-12 NOTE — PROGRESS NOTES
SUBJECTIVE:  Liyah Rodriguez is a 31 year old female was with 1 week history of URI symptoms states that she does have some chest congestion and having some slight shortness of breath and did hear occasional wheezing at night.  She also does have some sinus pain and pressure.  Her ears feel increasing in pain and pressure.  Feels like there can a pop.  She denies significant sore throat.  She did have a fever several days ago but have since resolved.  She denies headache GI symptoms or rashes.  She is vaccinated for COVID and unsure of exposure.  No underlying asthma  Has been taking over-the-counter medications for symptom relief.    Patient Active Problem List   Diagnosis     CARDIOVASCULAR SCREENING; LDL GOAL LESS THAN 160     Chronic hypertension affecting pregnancy       Current Outpatient Medications   Medication     sertraline (ZOLOFT) 50 MG tablet     acetaminophen (TYLENOL) 325 MG tablet     ibuprofen (ADVIL/MOTRIN) 600 MG tablet     VITAMIN D PO     No current facility-administered medications for this visit.     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[employer]   Tobacco Use     Smoking status: Never Smoker     Smokeless tobacco: Never Used   Substance and Sexual Activity     Alcohol use: Yes     Comment: occ     Drug use: Never     Sexual activity: Yes     Partners: Male     Birth control/protection: None   Other Topics Concern     Not on file   Social History Narrative     Not on file     Social Determinants of Health     Financial Resource Strain: Not on file   Food Insecurity: Not on file   Transportation Needs: Not on file   Physical Activity: Not on file   Stress: Not on file   Social Connections: Not on file   Intimate Partner Violence: Not on file    Housing Stability: Not on file     ROS  Negative other than stated above    Exam:  GENERAL APPEARANCE: healthy, alert and no distress  EYES: EOMI,  PERRL  HENT: Bilateral TM erythematous and bulging.  Loss of landmarks distorted light reflex.  Canals are clear.  Oral mucosa moist with no erythema exudate uvula midline with no evidence for abscess.  Does have mild nasal congestion but no significant sinus pain just pressure.  Postnasal drainage noted  NECK: no adenopathy, no asymmetry, masses, or scars and thyroid normal to palpation full range of motion  RESP: lungs clear to auscultation - no rales, rhonchi or wheezes  CV: regular rates and rhythm, normal S1 S2, no S3 or S4 and no murmur, click or rub   SKIN: no suspicious lesions or rashes    assessment/plan:  (H66.93) Bilateral otitis media, unspecified otitis media type  (primary encounter diagnosis)  Comment:   Plan: amoxicillin (AMOXIL) 875 MG tablet        Patient with 1 week history of URI symptoms and seeming to be getting worse increasing ear pain and pressure.  On exam she does note to have bilateral otitis media.  Will treat with amoxicillin as directed over-the-counter medicine as needed for pain.  Signs of perforation discussed.  Follow-up with primary if needed    (Z20.822) Suspected 2019 novel coronavirus infection  Comment:   Plan: Symptomatic; Yes; 6/6/2022 COVID-19 Virus         (Coronavirus) by PCR Nose        COVID test pending.  Supportive cares and red flag signs of shortness of breath or chest pain    (R05.9) Cough  Comment:   Plan: albuterol (PROAIR HFA/PROVENTIL HFA/VENTOLIN         HFA) 108 (90 Base) MCG/ACT inhaler          Cough mild lungs are clear on exam.  With reassuring vitals at this time.  There is no current wheezing at this time but she does state that it occurred at night.  She would like inhaler just in case.  Albuterol as needed for symptoms.  May continue with over-the-counter medication for cough.

## 2022-07-11 NOTE — TELEPHONE ENCOUNTER
Pharmacy should have refills available. Prescription was sent in March for #90 tablets per refill and 4 refills were available. RN unsure why pharmacy is requesting a new Rx.   Araceli Campos RN

## 2022-09-10 ENCOUNTER — HEALTH MAINTENANCE LETTER (OUTPATIENT)
Age: 32
End: 2022-09-10

## 2022-10-26 ENCOUNTER — LAB (OUTPATIENT)
Dept: LAB | Facility: CLINIC | Age: 32
End: 2022-10-26
Payer: COMMERCIAL

## 2022-10-26 DIAGNOSIS — Z11.59 NEED FOR HEPATITIS C SCREENING TEST: ICD-10-CM

## 2022-10-26 DIAGNOSIS — Z13.0 SCREENING, ANEMIA, DEFICIENCY, IRON: ICD-10-CM

## 2022-10-26 DIAGNOSIS — Z13.29 SCREENING FOR THYROID DISORDER: ICD-10-CM

## 2022-10-26 DIAGNOSIS — Z13.220 SCREENING FOR LIPID DISORDERS: ICD-10-CM

## 2022-10-26 DIAGNOSIS — N92.6 IRREGULAR MENSES: ICD-10-CM

## 2022-10-26 LAB
CHOLEST SERPL-MCNC: 169 MG/DL
ERYTHROCYTE [DISTWIDTH] IN BLOOD BY AUTOMATED COUNT: 12.8 % (ref 10–15)
ESTRADIOL SERPL-MCNC: 58 PG/ML
FASTING STATUS PATIENT QL REPORTED: NO
FSH SERPL IRP2-ACNC: 4.9 MIU/ML
HCT VFR BLD AUTO: 41.1 % (ref 35–47)
HCV AB SERPL QL IA: NONREACTIVE
HDLC SERPL-MCNC: 36 MG/DL
HGB BLD-MCNC: 13.4 G/DL (ref 11.7–15.7)
LDLC SERPL CALC-MCNC: 103 MG/DL
LH SERPL-ACNC: 2.3 MIU/ML
MCH RBC QN AUTO: 29.3 PG (ref 26.5–33)
MCHC RBC AUTO-ENTMCNC: 32.6 G/DL (ref 31.5–36.5)
MCV RBC AUTO: 90 FL (ref 78–100)
MIS SERPL-MCNC: 0.68 NG/ML (ref 0.58–8.1)
NONHDLC SERPL-MCNC: 133 MG/DL
PLATELET # BLD AUTO: 267 10E3/UL (ref 150–450)
RBC # BLD AUTO: 4.57 10E6/UL (ref 3.8–5.2)
TRIGL SERPL-MCNC: 150 MG/DL
TSH SERPL DL<=0.005 MIU/L-ACNC: 2.61 MU/L (ref 0.4–4)
WBC # BLD AUTO: 8.8 10E3/UL (ref 4–11)

## 2022-10-26 PROCEDURE — 84443 ASSAY THYROID STIM HORMONE: CPT | Performed by: OBSTETRICS & GYNECOLOGY

## 2022-10-26 PROCEDURE — 82670 ASSAY OF TOTAL ESTRADIOL: CPT | Performed by: OBSTETRICS & GYNECOLOGY

## 2022-10-26 PROCEDURE — 83002 ASSAY OF GONADOTROPIN (LH): CPT | Performed by: OBSTETRICS & GYNECOLOGY

## 2022-10-26 PROCEDURE — 83520 IMMUNOASSAY QUANT NOS NONAB: CPT | Performed by: OBSTETRICS & GYNECOLOGY

## 2022-10-26 PROCEDURE — 36415 COLL VENOUS BLD VENIPUNCTURE: CPT | Performed by: OBSTETRICS & GYNECOLOGY

## 2022-10-26 PROCEDURE — 85027 COMPLETE CBC AUTOMATED: CPT | Performed by: OBSTETRICS & GYNECOLOGY

## 2022-10-26 PROCEDURE — 86803 HEPATITIS C AB TEST: CPT | Performed by: OBSTETRICS & GYNECOLOGY

## 2022-10-26 PROCEDURE — 80061 LIPID PANEL: CPT | Performed by: OBSTETRICS & GYNECOLOGY

## 2022-10-26 PROCEDURE — 83001 ASSAY OF GONADOTROPIN (FSH): CPT | Performed by: OBSTETRICS & GYNECOLOGY

## 2022-12-25 ENCOUNTER — OFFICE VISIT (OUTPATIENT)
Dept: URGENT CARE | Facility: URGENT CARE | Age: 32
End: 2022-12-25
Payer: COMMERCIAL

## 2022-12-25 ENCOUNTER — ANCILLARY PROCEDURE (OUTPATIENT)
Dept: GENERAL RADIOLOGY | Facility: CLINIC | Age: 32
End: 2022-12-25
Attending: FAMILY MEDICINE
Payer: COMMERCIAL

## 2022-12-25 VITALS
SYSTOLIC BLOOD PRESSURE: 150 MMHG | TEMPERATURE: 98.9 F | DIASTOLIC BLOOD PRESSURE: 106 MMHG | OXYGEN SATURATION: 97 % | RESPIRATION RATE: 16 BRPM | WEIGHT: 214.8 LBS | BODY MASS INDEX: 38.05 KG/M2 | HEART RATE: 99 BPM

## 2022-12-25 DIAGNOSIS — R05.1 ACUTE COUGH: Primary | ICD-10-CM

## 2022-12-25 DIAGNOSIS — R50.9 FEVER IN ADULT: ICD-10-CM

## 2022-12-25 DIAGNOSIS — R05.1 ACUTE COUGH: ICD-10-CM

## 2022-12-25 LAB
FLUAV AG SPEC QL IA: NEGATIVE
FLUBV AG SPEC QL IA: NEGATIVE

## 2022-12-25 PROCEDURE — 99213 OFFICE O/P EST LOW 20 MIN: CPT | Performed by: FAMILY MEDICINE

## 2022-12-25 PROCEDURE — 71046 X-RAY EXAM CHEST 2 VIEWS: CPT | Mod: TC | Performed by: RADIOLOGY

## 2022-12-25 PROCEDURE — 87804 INFLUENZA ASSAY W/OPTIC: CPT | Performed by: FAMILY MEDICINE

## 2022-12-25 RX ORDER — BENZONATATE 100 MG/1
200 CAPSULE ORAL 3 TIMES DAILY PRN
Qty: 42 CAPSULE | Refills: 3 | Status: SHIPPED | OUTPATIENT
Start: 2022-12-25 | End: 2023-05-02

## 2022-12-25 NOTE — PROGRESS NOTES
SUBJECTIVE:   Liyah Rodriguez is a 32 year old female presenting with a chief complaint of fevers up to 100.7 F for the past 2 days, cough productive of phlegm (initially a dry mild cough but, over the past 2-3 days, productive of yellowish but now tan phlegm) for the past 10-11 days, rattling in the chest, mild headache (yesterday only with a dull headache at the posterior head).No headaches today.   Course of illness is worsening. .    Current and Associated symptoms: as listed above.    Treatment measures tried include Dayquil and Nyquil.  .  Predisposing factors include her daughter has a mild cough.    Patient works in an emergency room an wears.  .    Her at-home COVID-19 test this morning was negative.  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      She received two doses of the Pfizer COVID-19 vaccine on December 22, 2020, and on January 11, 2021.  She has already received one booster dose in September 2022.        Past Medical History:   Diagnosis Date     NO ACTIVE PROBLEMS      Current Outpatient Medications   Medication Sig Dispense Refill     sertraline (ZOLOFT) 50 MG tablet Take 1 tablet (50 mg) by mouth daily 90 tablet 4     acetaminophen (TYLENOL) 325 MG tablet Take 2 tablets (650 mg) by mouth every 6 hours as needed for mild pain Start after Delivery. (Patient not taking: Reported on 2/11/2022) 100 tablet 0     albuterol (PROAIR HFA/PROVENTIL HFA/VENTOLIN HFA) 108 (90 Base) MCG/ACT inhaler Inhale 2-4 puffs into the lungs every 4 hours as needed for wheezing (Patient not taking: Reported on 12/25/2022) 18 g 0     ibuprofen (ADVIL/MOTRIN) 600 MG tablet Take 1 tablet (600 mg) by mouth every 6 hours as needed for moderate pain Start after delivery (Patient not taking: Reported on 2/11/2022) 60 tablet 0     VITAMIN D PO  (Patient not taking: Reported on 2/11/2022)       Social History     Tobacco Use     Smoking status: Never      Smokeless tobacco: Never   Substance Use Topics     Alcohol use: Yes     Comment: occ       ROS:  CONSTITUTIONAL:positive for fever yesterday.   ENT/MOUTH:  Positive for occasional stuffy nose.   RESP: positive for productive cough.      OBJECTIVE:  BP (!) 150/106   Pulse 99   Temp 98.9  F (37.2  C)   Wt 97.4 kg (214 lb 12.8 oz)   SpO2 97%   BMI 38.05 kg/m   Respiratory Rate:  16   GENERAL APPEARANCE: healthy, alert and no distress.  No acute respiratory distress.    HENT: oropharynx is mildly erythematous without exudates.    NECK: supple, nontender, no lymphadenopathy.    RESP: lungs clear to auscultation - no rales, rhonchi or wheezes  CV: regular rates and rhythm, normal S1 S2, no murmur noted  SKIN: No cyanosis.  No pallor.  No diaphoresis.      LAB:    Results for orders placed or performed in visit on 12/25/22   Influenza A & B Antigen - Clinic Collect     Status: Normal    Specimen: Nose; Swab   Result Value Ref Range    Influenza A antigen Negative Negative    Influenza B antigen Negative Negative    Narrative    Test results must be correlated with clinical data. If necessary, results should be confirmed by a molecular assay or viral culture.         chest x-ray:  I viewed all X-ray images during this clinic encounter.  The chest x-ray showed no acute infiltrates/effusions/consolidations/pneumothorax.  No cardiomegaly.      ASSESSMENT:  Acute Cough, most likely secondary to viral bronchitis.  Patient's influenza test today was negative.    Fever in Adult.      PLAN:  Rx:  Tessalon Perles  follow up if not better in 5 days.      Meek Lauren MD

## 2023-02-27 NOTE — TELEPHONE ENCOUNTER
"Requested Prescriptions   Pending Prescriptions Disp Refills     sertraline (ZOLOFT) 50 MG tablet [Pharmacy Med Name: SERTRALINE 50MG TABLETS] 90 tablet 4     Sig: TAKE 1 TABLET(50 MG) BY MOUTH DAILY       SSRIs Protocol Failed - 2/27/2023  8:12 AM        Failed - PHQ-9 score less than 5 in past 6 months     Please review last PHQ-9 score.           Failed - Recent (6 mo) or future (30 days) visit within the authorizing provider's specialty     Patient had office visit in the last 6 months or has a visit in the next 30 days with authorizing provider or within the authorizing provider's specialty.  See \"Patient Info\" tab in inbasket, or \"Choose Columns\" in Meds & Orders section of the refill encounter.            Passed - Medication is active on med list        Passed - Patient is age 18 or older        Passed - No active pregnancy on record        Passed - No positive pregnancy test in last 12 months           Last Written Prescription Date:  2/4/22  Last Fill Quantity: 90,  # refills: 4   Last office visit: 2/4/2022 with prescribing provider:  Dr. Reese   Future Office Visit:    None    Medication is being filled for 1 time refill only due to:  Patient needs to be seen because it has been more than one year since last visit.   PHQ and LEEANN sent via [employer] and reminder to be seen for annual.   Gail Weinberg RN on 2/27/2023 at 10:15 AM              "

## 2023-04-30 ENCOUNTER — HEALTH MAINTENANCE LETTER (OUTPATIENT)
Age: 33
End: 2023-04-30

## 2023-05-01 SDOH — ECONOMIC STABILITY: TRANSPORTATION INSECURITY
IN THE PAST 12 MONTHS, HAS LACK OF TRANSPORTATION KEPT YOU FROM MEETINGS, WORK, OR FROM GETTING THINGS NEEDED FOR DAILY LIVING?: NO

## 2023-05-01 SDOH — HEALTH STABILITY: PHYSICAL HEALTH: ON AVERAGE, HOW MANY DAYS PER WEEK DO YOU ENGAGE IN MODERATE TO STRENUOUS EXERCISE (LIKE A BRISK WALK)?: 2 DAYS

## 2023-05-01 SDOH — ECONOMIC STABILITY: INCOME INSECURITY: IN THE LAST 12 MONTHS, WAS THERE A TIME WHEN YOU WERE NOT ABLE TO PAY THE MORTGAGE OR RENT ON TIME?: NO

## 2023-05-01 SDOH — ECONOMIC STABILITY: FOOD INSECURITY: WITHIN THE PAST 12 MONTHS, THE FOOD YOU BOUGHT JUST DIDN'T LAST AND YOU DIDN'T HAVE MONEY TO GET MORE.: NEVER TRUE

## 2023-05-01 SDOH — HEALTH STABILITY: PHYSICAL HEALTH: ON AVERAGE, HOW MANY MINUTES DO YOU ENGAGE IN EXERCISE AT THIS LEVEL?: 30 MIN

## 2023-05-01 SDOH — ECONOMIC STABILITY: FOOD INSECURITY: WITHIN THE PAST 12 MONTHS, YOU WORRIED THAT YOUR FOOD WOULD RUN OUT BEFORE YOU GOT MONEY TO BUY MORE.: NEVER TRUE

## 2023-05-01 SDOH — ECONOMIC STABILITY: INCOME INSECURITY: HOW HARD IS IT FOR YOU TO PAY FOR THE VERY BASICS LIKE FOOD, HOUSING, MEDICAL CARE, AND HEATING?: NOT VERY HARD

## 2023-05-01 SDOH — ECONOMIC STABILITY: TRANSPORTATION INSECURITY
IN THE PAST 12 MONTHS, HAS THE LACK OF TRANSPORTATION KEPT YOU FROM MEDICAL APPOINTMENTS OR FROM GETTING MEDICATIONS?: NO

## 2023-05-01 ASSESSMENT — SOCIAL DETERMINANTS OF HEALTH (SDOH)
HOW OFTEN DO YOU ATTEND CHURCH OR RELIGIOUS SERVICES?: NEVER
HOW OFTEN DO YOU GET TOGETHER WITH FRIENDS OR RELATIVES?: ONCE A WEEK
DO YOU BELONG TO ANY CLUBS OR ORGANIZATIONS SUCH AS CHURCH GROUPS UNIONS, FRATERNAL OR ATHLETIC GROUPS, OR SCHOOL GROUPS?: NO
IN A TYPICAL WEEK, HOW MANY TIMES DO YOU TALK ON THE PHONE WITH FAMILY, FRIENDS, OR NEIGHBORS?: THREE TIMES A WEEK

## 2023-05-01 ASSESSMENT — LIFESTYLE VARIABLES
SKIP TO QUESTIONS 9-10: 1
HOW MANY STANDARD DRINKS CONTAINING ALCOHOL DO YOU HAVE ON A TYPICAL DAY: 1 OR 2
HOW OFTEN DO YOU HAVE A DRINK CONTAINING ALCOHOL: MONTHLY OR LESS
AUDIT-C TOTAL SCORE: 1
HOW OFTEN DO YOU HAVE SIX OR MORE DRINKS ON ONE OCCASION: NEVER

## 2023-05-01 ASSESSMENT — ENCOUNTER SYMPTOMS
EYE PAIN: 0
DIZZINESS: 0
HEARTBURN: 0
PALPITATIONS: 0
SORE THROAT: 0
JOINT SWELLING: 0
CHILLS: 0
PARESTHESIAS: 0
CONSTIPATION: 0
FEVER: 0
COUGH: 0
MYALGIAS: 0
HEADACHES: 0
BREAST MASS: 0
HEMATURIA: 0
DYSURIA: 0
NERVOUS/ANXIOUS: 1
NAUSEA: 0
HEMATOCHEZIA: 0
ARTHRALGIAS: 0
WEAKNESS: 0
SHORTNESS OF BREATH: 0
ABDOMINAL PAIN: 0
DIARRHEA: 0
FREQUENCY: 0

## 2023-05-02 ENCOUNTER — OFFICE VISIT (OUTPATIENT)
Dept: PEDIATRICS | Facility: CLINIC | Age: 33
End: 2023-05-02
Payer: COMMERCIAL

## 2023-05-02 VITALS
RESPIRATION RATE: 18 BRPM | TEMPERATURE: 97.8 F | HEIGHT: 63 IN | BODY MASS INDEX: 39.18 KG/M2 | OXYGEN SATURATION: 99 % | DIASTOLIC BLOOD PRESSURE: 84 MMHG | WEIGHT: 221.1 LBS | HEART RATE: 77 BPM | SYSTOLIC BLOOD PRESSURE: 136 MMHG

## 2023-05-02 DIAGNOSIS — Z00.00 ROUTINE GENERAL MEDICAL EXAMINATION AT A HEALTH CARE FACILITY: Primary | ICD-10-CM

## 2023-05-02 DIAGNOSIS — R23.4 FLAKING OF SCALP: ICD-10-CM

## 2023-05-02 PROCEDURE — 99213 OFFICE O/P EST LOW 20 MIN: CPT | Mod: 25 | Performed by: STUDENT IN AN ORGANIZED HEALTH CARE EDUCATION/TRAINING PROGRAM

## 2023-05-02 PROCEDURE — 99395 PREV VISIT EST AGE 18-39: CPT | Performed by: STUDENT IN AN ORGANIZED HEALTH CARE EDUCATION/TRAINING PROGRAM

## 2023-05-02 RX ORDER — KETOCONAZOLE 20 MG/ML
SHAMPOO TOPICAL DAILY PRN
Qty: 120 ML | Refills: 3 | Status: SHIPPED | OUTPATIENT
Start: 2023-05-02 | End: 2023-09-21

## 2023-05-02 ASSESSMENT — ENCOUNTER SYMPTOMS
PALPITATIONS: 0
CONSTIPATION: 0
ABDOMINAL PAIN: 0
EYE PAIN: 0
DYSURIA: 0
NERVOUS/ANXIOUS: 1
SORE THROAT: 0
COUGH: 0
FREQUENCY: 0
CHILLS: 0
HEMATOCHEZIA: 0
ARTHRALGIAS: 0
DIARRHEA: 0
MYALGIAS: 0
HEMATURIA: 0
HEADACHES: 0
PARESTHESIAS: 0
FEVER: 0
WEAKNESS: 0
NAUSEA: 0
DIZZINESS: 0
JOINT SWELLING: 0
SHORTNESS OF BREATH: 0
HEARTBURN: 0
BREAST MASS: 0

## 2023-05-02 ASSESSMENT — PAIN SCALES - GENERAL: PAINLEVEL: NO PAIN (0)

## 2023-05-02 NOTE — PROGRESS NOTES
KELSI     SUBJECTIVE:   CC: Liyah is an 32 year old who presents for preventive health visit.        View : No data to display.            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    Had some elevated blood pressure on estrogen OCP and during pregnancy    Sertraline (Zoloft)  -started for post partum depression  -still has some baseline anxiety that sertraline (Zoloft) helps a lot with     Today's PHQ-2 Score:       5/1/2023    11:52 AM   PHQ-2 ( 1999 Pfizer)   Q1: Little interest or pleasure in doing things 1   Q2: Feeling down, depressed or hopeless 1   PHQ-2 Score 2   Q1: Little interest or pleasure in doing things Not at all    Several days   Q2: Feeling down, depressed or hopeless Several days    Several days   PHQ-2 Score 1    2       Have you ever done Advance Care Planning? (For example, a Health Directive, POLST, or a discussion with a medical provider or your loved ones about your wishes): No, advance care planning information given to patient to review.  Patient declined advance care planning discussion at this time.    Social History     Tobacco Use     Smoking status: Never     Smokeless tobacco: Never   Vaping Use     Vaping status: Never Used   Substance Use Topics     Alcohol use: Yes     Comment: occasional, 2-3 drinks per month             5/1/2023    11:52 AM   Alcohol Use   Prescreen: >3 drinks/day or >7 drinks/week? No          View : No data to display.              Reviewed orders with  patient.  Reviewed health maintenance and updated orders accordingly - Yes    Breast Cancer Screenin/1/2023    11:58 AM   Breast CA Risk Assessment (FHS-7)   Do you have a family history of breast, colon, or ovarian cancer? No / Unknown       Patient under 40 years of age: Routine Mammogram Screening not recommended.   Pertinent mammograms are reviewed under the imaging tab.    History of abnormal Pap smear: NO - age 30-65 PAP every 5 years with negative HPV co-testing recommended      Latest Ref Rng & Units 2022     2:00 PM 2017     3:26 PM 2013    12:00 AM   PAP / HPV   PAP  Negative for Intraepithelial Lesion or Malignancy (NILM)       PAP (Historical)   NIL   NIL     HPV 16 DNA Negative Negative       HPV 18 DNA Negative Negative       Other HR HPV Negative Negative         Reviewed and updated as needed this visit by clinical staff   Tobacco  Allergies  Meds              Reviewed and updated as needed this visit by Provider     Meds               Review of Systems   Constitutional: Negative for chills and fever.   HENT: Negative for congestion, ear pain, hearing loss and sore throat.    Eyes: Negative for pain and visual disturbance.   Respiratory: Negative for cough and shortness of breath.    Cardiovascular: Negative for chest pain, palpitations and peripheral edema.   Gastrointestinal: Negative for abdominal pain, constipation, diarrhea, heartburn, hematochezia and nausea.   Breasts:  Negative for tenderness, breast mass and discharge.   Genitourinary: Negative for dysuria, frequency, genital sores, hematuria, pelvic pain, urgency, vaginal bleeding and vaginal discharge.   Musculoskeletal: Negative for arthralgias, joint swelling and myalgias.   Skin: Negative for rash.   Neurological: Negative for dizziness, weakness, headaches and paresthesias.   Psychiatric/Behavioral: Negative for mood changes. The patient is nervous/anxious.      OBJECTIVE:   /84 (BP Location: Right  "arm, Patient Position: Sitting, Cuff Size: Adult Large)   Pulse 77   Temp 97.8  F (36.6  C) (Tympanic)   Resp 18   Ht 1.6 m (5' 3\")   Wt 100.3 kg (221 lb 1.6 oz)   LMP 04/14/2023 (Exact Date)   SpO2 99%   BMI 39.17 kg/m    Physical Exam  GENERAL: healthy, alert and no distress  EYES: Eyes grossly normal to inspection, and conjunctivae and sclerae normal  HENT: ear canals and TM's normal  NECK: no adenopathy, no asymmetry, masses, or scars and thyroid normal to palpation  RESP: lungs clear to auscultation - no rales, rhonchi or wheezes  CV: regular rate and rhythm, normal S1 S2, no S3 or S4, no murmur, click or rub, no peripheral edema and peripheral pulses strong  ABDOMEN: soft, nontender, no hepatosplenomegaly, no masses  MS: no gross musculoskeletal defects noted, no edema  SKIN: no suspicious lesions or rashes  NEURO: Normal strength and tone, mentation intact and speech normal  PSYCH: mentation appears normal, affect normal/bright      ASSESSMENT/PLAN:       ICD-10-CM    1. Routine general medical examination at a health care facility  Z00.00       2. Postpartum depression  F53.0 sertraline (ZOLOFT) 50 MG tablet      3. Flaking of scalp  R23.4 Adult Dermatology Referral     ketoconazole (NIZORAL) 2 % external shampoo          COUNSELING:  Reviewed preventive health counseling, as reflected in patient instructions    She reports that she has never smoked. She has never used smokeless tobacco.          Sera Longoria MD  [facility]  "

## 2023-05-02 NOTE — PATIENT INSTRUCTIONS
Jojoba Oil 100% pure         Preventive Health Recommendations  Female Ages 26 - 39  Yearly exam:   See your health care provider every year in order to  Review health changes.   Discuss preventive care.    Review your medicines if you your doctor has prescribed any.    Until age 30: Get a Pap test every three years (more often if you have had an abnormal result).    After age 30: Talk to your doctor about whether you should have a Pap test every 3 years or have a Pap test with HPV screening every 5 years.   You do not need a Pap test if your uterus was removed (hysterectomy) and you have not had cancer.  You should be tested each year for STDs (sexually transmitted diseases), if you're at risk.   Talk to your provider about how often to have your cholesterol checked.  If you are at risk for diabetes, you should have a diabetes test (fasting glucose).  Shots: Get a flu shot each year. Get a tetanus shot every 10 years.   Nutrition:   Eat at least 5 servings of fruits and vegetables each day.  Eat whole-grain bread, whole-wheat pasta and brown rice instead of white grains and rice.  Get adequate Calcium and Vitamin D.     Lifestyle  Exercise at least 150 minutes a week (30 minutes a day, 5 days of the week). This will help you control your weight and prevent disease.  Limit alcohol to one drink per day.  No smoking.   Wear sunscreen to prevent skin cancer.  See your dentist every six months for an exam and cleaning.

## 2023-07-24 ENCOUNTER — OFFICE VISIT (OUTPATIENT)
Dept: MIDWIFE SERVICES | Facility: CLINIC | Age: 33
End: 2023-07-24
Payer: COMMERCIAL

## 2023-07-24 VITALS
WEIGHT: 222 LBS | BODY MASS INDEX: 39.34 KG/M2 | DIASTOLIC BLOOD PRESSURE: 80 MMHG | HEIGHT: 63 IN | SYSTOLIC BLOOD PRESSURE: 132 MMHG

## 2023-07-24 DIAGNOSIS — Z30.430 ENCOUNTER FOR IUD INSERTION: Primary | ICD-10-CM

## 2023-07-24 PROCEDURE — 58300 INSERT INTRAUTERINE DEVICE: CPT | Performed by: ADVANCED PRACTICE MIDWIFE

## 2023-07-24 PROCEDURE — 99213 OFFICE O/P EST LOW 20 MIN: CPT | Mod: 25 | Performed by: ADVANCED PRACTICE MIDWIFE

## 2023-07-24 NOTE — PROGRESS NOTES
SUBJECTIVE:   Liyah Rodriguez is a 33 year old who presents to the clinic for discussion of birth control methods.   She has used the following methods in the past: Mirena IUD and Depo Provera  Today she is interested in discussing Mirena IUD  Histories reviewed and updated  Past Medical History:   Diagnosis Date    Depressive disorder 2020    Hypertension 2012    side effect of estrogen birth control, and during pregnancy    NO ACTIVE PROBLEMS      Past Surgical History:   Procedure Laterality Date    ACL repair  06/2008    DILATION AND CURETTAGE SUCTION N/A 5/24/2019    Procedure: Suction Dilation and Curettage   (9 Weeks and 5 Days);  Surgeon: Coni Barnett MD;  Location: [facility] OR    ORTHOPEDIC SURGERY Right     knee surgery    Right lateral menisectomy  06/2008    XR HYSTEROSALPINGOGRAM  02/08/2019    normal fill and spill     Social History     Socioeconomic History    Marital status:      Spouse name: Not on file    Number of children: Not on file    Years of education: Not on file    Highest education level: Not on file   Occupational History     Employer: [employer]     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[employer]   Tobacco Use    Smoking status: Never    Smokeless tobacco: Never   Vaping Use    Vaping Use: Never used   Substance and Sexual Activity    Alcohol use: Yes     Comment: occasional, 2-3 drinks per month    Drug use: No    Sexual activity: Yes     Partners: Male     Birth control/protection: None   Other Topics Concern    Parent/sibling w/ CABG, MI or angioplasty before 65F 55M? No   Social History Narrative    Not on file     Social Determinants of Health     Financial Resource Strain: Low Risk  (5/1/2023)    Overall Financial Resource Strain (CARDIA)     Difficulty of Paying Living Expenses: Not very hard   Food Insecurity: No Food Insecurity (5/1/2023)    Hunger Vital Sign     Worried About Running Out of Food in the Last Year: Never true     Ran Out of Food in the Last Year: Never true    Transportation Needs: No Transportation Needs (2023)    PRAPARE - Transportation     Lack of Transportation (Medical): No     Lack of Transportation (Non-Medical): No   Physical Activity: Insufficiently Active (2023)    Exercise Vital Sign     Days of Exercise per Week: 2 days     Minutes of Exercise per Session: 30 min   Stress: Stress Concern Present (2023)    [employer] - Occupational Stress Questionnaire     Feeling of Stress : Rather much   Social Connections: Moderately Isolated (2023)    Social Connection and Isolation Panel [NHANES]     Frequency of Communication with Friends and Family: Three times a week     Frequency of Social Gatherings with Friends and Family: Once a week     Attends Nondenominational Services: Never     Active Member of Clubs or Organizations: No     Attends Club or Organization Meetings: Not on file     Marital Status:    Intimate Partner Violence: Not At Risk (2019)    Humiliation, Afraid, Rape, and Kick questionnaire     Fear of Current or Ex-Partner: No     Emotionally Abused: No     Physically Abused: No     Sexually Abused: No   Housing Stability: Low Risk  (2023)    Housing Stability Vital Sign     Unable to Pay for Housing in the Last Year: No     Number of Places Lived in the Last Year: 1     Unstable Housing in the Last Year: No     Family History   Problem Relation Age of Onset    Heart Disease Paternal Grandmother         CHF    Hypertension Mother     Hypertension Maternal Grandmother     High cholesterol Maternal Grandmother     Cancer Maternal Grandfather         lung cancer    Cancer Father 50        lymphoma    Aneurysm Father 63        spotty vision, occasional weakness prior- then sudden death, smoker    Hypertension Father     Cerebrovascular Disease Father          of brain aneurysm at age 63    Other Cancer Father         Non-hodgkins lymphoma       Menstrual History:  Menses every 23-26 days.  Length of  "menses: 4 days  Menstrual description: normal    Denies the following contraindications to estrogen/progesterone combined contraception:  Migraine with aura  Smoking over age 35  Liver disease  Personal history of blood clot or stroke   History of heart disease  History of breast cancer  Undiagnosed vaginal bleeding  Hypertension  Pregnancy    Health maintenance updated:  yes    ROS:   CONSTITUTIONAL: NEGATIVE for fever, chills, change in weight  INTEGUMENTARU/SKIN: NEGATIVE for worrisome rashes, moles or lesions  EYES: NEGATIVE for vision changes or irritation  ENT: NEGATIVE for ear, mouth and throat problems  RESP: NEGATIVE for significant cough or SOB  BREAST: NEGATIVE for masses, tenderness or discharge  CV: NEGATIVE for chest pain, palpitations or peripheral edema  GI: NEGATIVE for nausea, abdominal pain, heartburn, or change in bowel habits  : NEGATIVE for unusual urinary or vaginal symptoms. Periods are regular.  MUSCULOSKELETAL: NEGATIVE for significant arthralgias or myalgia  NEURO: NEGATIVE for weakness, dizziness or paresthesias  PSYCHIATRIC: NEGATIVE for changes in mood or affect    EXAM:  /80   Ht 1.6 m (5' 3\")   Wt 100.7 kg (222 lb)   LMP 07/21/2023   BMI 39.33 kg/m    Body mass index is 39.33 kg/m .  Exam:  Constitutional: healthy, alert and no distress  Respiratory: negative, Percussion normal. Good diaphragmatic excursion.   Psychiatric: mentation appears normal and affect normal/bright  No further exam needed as this is a counseling appointment.    ASSESSMENT/PLAN:  No diagnosis found.  There are no contraindications to the use of Mirena IUD    COUNSELING:  Reviewed risks and benefits of contraceptive use  Discussed proper use of chosen method  Handouts/Instructions provided      Twenty minutes were spent above and beyond procedure time discussing various contraception options.       IUD Insertion:  CONSULT:    Is a pregnancy test required: No.  Was a consent obtained?  " "Yes    Subjective: Liyah Rodriguez is a 33 year old  presents for IUD and desires Mirena type IUD.    Patient has been given the opportunity to ask questions about all forms of birth control, including all options appropriate for Liyah Rodriguez. Discussed that no method of birth control, except abstinence is 100% effective against pregnancy or sexually transmitted infection.     Liyah Rodriguez understands she may have the IUD removed at any time. IUD should be removed by a health care provider.    The entire insertion procedure was reviewed with the patient, including care after placement.    Patient's last menstrual period was 2023. Last sexual activity: prior to period . No allergy to betadine or shellfish. Patient declines STD screening  HCG Qual Urine   Date Value Ref Range Status   2019 Positive (A) NEG^Negative Final     Comment:     This test is for screening purposes.  Results should be interpreted along with   the clinical picture.  Confirmation testing is available if warranted by   ordering SXG035, HCG Quantitative Pregnancy.           /80   Ht 1.6 m (5' 3\")   Wt 100.7 kg (222 lb)   LMP 2023   BMI 39.33 kg/m      Pelvic Exam:   EG/BUS: normal genital architecture without lesions, erythema or abnormal secretions.   Vagina: moist, pink, rugae with physiologic discharge and secretions  Cervix: parous no lesions and pink, moist with some bloody period discharge, closed, without lesion or CMT  Uterus: mobile, no pain  Adnexa: within normal limits and no masses, nodularity, tenderness    PROCEDURE NOTE: -- IUD Insertion    Reason for Insertion: contraception    Premedicated with ibuprofen here in clinic.   Under sterile technique, cervix was visualized with speculum and prepped with Betadine solution swab x 3. Tenaculum was placed for stability. The uterus was gently straightened and sounded to 7.0 cm. IUD prepared for placement, and " IUD inserted according to 's instructions without difficulty or significant resitance, and deployed at the fundus. The strings were visualized and trimmed to 3.0 cm from the external os. Tenaculum was removed and hemostasis noted. Speculum removed.  Patient tolerated procedure well.    EBL: minimal    Complications: none    ASSESSMENT:     ICD-10-CM    1. Encounter for IUD insertion  Z30.430 levonorgestrel (MIRENA) 52 MG (20 mcg/day) IUD 1 each     INSERTION INTRAUTERINE DEVICE      2. Encounter for insertion of intrauterine contraceptive device  Z30.430            PLAN:    Given 's handouts, including when to have IUD removed, list of danger s/sx, side effects and follow up recommended. Encouraged condom use for prevention of STD. Back up contraception advised for 7 days if progestin method. Advised to call for any fever, for prolonged or severe pain or bleeding, abnormal vaginal discharge, or unable to palpate strings. She was advised to use pain medications (ibuprofen) as needed for mild to moderate pain. Advised to follow-up in clinic in 4-6 weeks for IUD string check if unable to find strings or as directed by provider.     Annette Guerra CNM

## 2023-07-24 NOTE — NURSING NOTE
"Chief Complaint   Patient presents with    Contraception     Has had Mirena IUD and used Depo Provera in the past       Initial /80   Ht 1.6 m (5' 3\")   Wt 100.7 kg (222 lb)   LMP 2023   BMI 39.33 kg/m   Estimated body mass index is 39.33 kg/m  as calculated from the following:    Height as of this encounter: 1.6 m (5' 3\").    Weight as of this encounter: 100.7 kg (222 lb).  BP completed using cuff size: regular    Questioned patient about current smoking habits.  Pt. has never smoked.          The following HM Due: NONE    Interested in Mirena IUD  +SA  SA about 5 days ago  Currently on anahy Perez CMA on 2023 at 9:46 AM      "

## 2023-09-18 NOTE — PROGRESS NOTES
[facility] Dermatology Note  Encounter Date: Sep 21, 2023  Office Visit     Dermatology Problem List:  FBSE 9/21/23  # Seborrheic dermatitis  - Current tx: ketoconazole 2% shampoo TIW, fluocinolone 0.01% oil daily prn  ____________________________________________    Assessment & Plan:     # Seborrheic dermatitis  Mild activity noted on exam today. Recommend increasing frequency of ketoconazole shampoo to 2-3 times/weekly for maintenance (daily prn for flares) - refills were provided. Additionally for flares, recommend initiation of fluocinolone 0.01% oil, to be used daily as needed for itching, flaking, and scale.    # Benign skin findings - benign nevi, skin tags, solar lentigines, cherry angiomas  Reassured patient of benign skin findings. There is no need for further treatment. Discussed signs/symptoms concerning for NMSC and melanoma.  - Sun protection: Counseled SPF30+ sunscreen, UPF clothing, sun avoidance, tanning bed avoidance.     Procedures Performed:   None    Follow-up: prn for new or changing lesions (yearly for seb derm refills if not otherwise prescribed by PCP or are flaring)    Staff and Resident:     Trini Pedersen MD  PGY3 Dermatology Resident  I, Bailey Veliz MD, saw this patient with the resident and agree with the resident s findings and plan of care as documented in the resident s note.    ____________________________________________    CC: Skin Check (FBSE.  No concerns.  Has had scalp concerns since she was younger, keeps it well controlled. )    HPI:  Ms. Liyah Rodriguez is a(n) 33 year old female who presents today as a new patient for a FBSE and seborrheic dermatitis.    - Liyah reports having no concerns with her skin today - she denies having any spots on her skin that are new, growing, changing, tender, crusting, or bleeding.   - She denies having a personal or familial history of skin cancer. She reports having multiple blistering sunburns as a  child and teenager. Reports using a tanning bed once. Has been using sunscreen routinely when outside.  - Liyah reports having longstanding seborrheic dermatitis. She has been using ketoconazole 2% shampoo weekly, and an apple cider vinegar rinse weekly currently for her symptoms currently. Has used T gel shampoo and other medicated shampoos in the past.    Patient is otherwise feeling well, without additional skin concerns.    Labs Reviewed:  N/A    Physical Exam:  Vitals: LMP 07/21/2023   SKIN: Full skin, which includes the head/face, both arms, chest, back, abdomen,both legs, genitalia and/or groin buttocks, digits and/or nails, was examined.  - There are dome shaped bright red papules on the trunk and extremities.   - Multiple regular brown pigmented macules and papules are identified on the face, trunk, and extremities.   - There is macular erythema of the frontal scalp with mild flaky white scale.  - Scattered brown macules on sun exposed areas..   - There is(are) skin colored pedunculated papules on the bilateral axillae.   - No other lesions of concern on areas examined.     Medications:  Current Outpatient Medications   Medication    ketoconazole (NIZORAL) 2 % external shampoo    levonorgestrel (MIRENA) 52 MG (20 mcg/day) IUD    sertraline (ZOLOFT) 50 MG tablet     No current facility-administered medications for this visit.      Past Medical History:   Patient Active Problem List   Diagnosis    CARDIOVASCULAR SCREENING; LDL GOAL LESS THAN 160    Chronic hypertension affecting pregnancy     Past Medical History:   Diagnosis Date    Depressive disorder 2020    Hypertension 2012    side effect of estrogen birth control, and during pregnancy    IUD (intrauterine device) in place 07/24/2023    Mirena    NO ACTIVE PROBLEMS      CC Sera Longoria MD  [address] [place] [address] on close of this encounter.

## 2023-09-21 ENCOUNTER — OFFICE VISIT (OUTPATIENT)
Dept: DERMATOLOGY | Facility: CLINIC | Age: 33
End: 2023-09-21
Attending: STUDENT IN AN ORGANIZED HEALTH CARE EDUCATION/TRAINING PROGRAM
Payer: COMMERCIAL

## 2023-09-21 DIAGNOSIS — D18.01 CHERRY ANGIOMA: ICD-10-CM

## 2023-09-21 DIAGNOSIS — L91.8 SKIN TAG: ICD-10-CM

## 2023-09-21 DIAGNOSIS — R23.4 FLAKING OF SCALP: ICD-10-CM

## 2023-09-21 DIAGNOSIS — D22.9 MULTIPLE BENIGN NEVI: ICD-10-CM

## 2023-09-21 DIAGNOSIS — L81.4 SOLAR LENTIGO: ICD-10-CM

## 2023-09-21 DIAGNOSIS — L21.9 DERMATITIS, SEBORRHEIC: Primary | ICD-10-CM

## 2023-09-21 PROCEDURE — 99203 OFFICE O/P NEW LOW 30 MIN: CPT | Mod: GC | Performed by: DERMATOLOGY

## 2023-09-21 RX ORDER — KETOCONAZOLE 20 MG/ML
SHAMPOO TOPICAL
Qty: 120 ML | Refills: 11 | Status: SHIPPED | OUTPATIENT
Start: 2023-09-21

## 2023-09-21 RX ORDER — FLUOCINOLONE ACETONIDE 0.11 MG/ML
OIL TOPICAL
Qty: 118.28 ML | Refills: 4 | Status: SHIPPED | OUTPATIENT
Start: 2023-09-21

## 2023-09-21 ASSESSMENT — PAIN SCALES - GENERAL: PAINLEVEL: NO PAIN (0)

## 2023-09-21 NOTE — NURSING NOTE
Dermatology Rooming Note    Liyah Rodriguez's goals for this visit include:   Chief Complaint   Patient presents with    Skin Check     FBSE.  No concerns.  Has had scalp concerns since she was younger, keeps it well controlled.      Yvonne Thomson, CMA

## 2023-09-21 NOTE — LETTER
9/21/2023       RE: Liyah Rodriguez  [address]  [place] [address]     Dear Colleague,    Thank you for referring your patient, Liyah Rodriguez, to the [facility] DERMATOLOGY [facility] [place] [facility]. Please see a copy of my visit note below.    [facility] Dermatology Note  Encounter Date: Sep 21, 2023  Office Visit     Dermatology Problem List:  FBSE 9/21/23  # Seborrheic dermatitis  - Current tx: ketoconazole 2% shampoo TIW, fluocinolone 0.01% oil daily prn  ____________________________________________    Assessment & Plan:     # Seborrheic dermatitis  Mild activity noted on exam today. Recommend increasing frequency of ketoconazole shampoo to 2-3 times/weekly for maintenance (daily prn for flares) - refills were provided. Additionally for flares, recommend initiation of fluocinolone 0.01% oil, to be used daily as needed for itching, flaking, and scale.    # Benign skin findings - benign nevi, skin tags, solar lentigines, cherry angiomas  Reassured patient of benign skin findings. There is no need for further treatment. Discussed signs/symptoms concerning for NMSC and melanoma.  - Sun protection: Counseled SPF30+ sunscreen, UPF clothing, sun avoidance, tanning bed avoidance.     Procedures Performed:   None    Follow-up: prn for new or changing lesions (yearly for seb derm refills if not otherwise prescribed by PCP or are flaring)    Staff and Resident:     Trini Pedersen MD  PGY3 Dermatology Resident  I, Bailey Veliz MD, saw this patient with the resident and agree with the resident s findings and plan of care as documented in the resident s note.    ____________________________________________    CC: Skin Check (FBSE.  No concerns.  Has had scalp concerns since she was younger, keeps it well controlled. )    HPI:  Ms. Liyah Rodriguez is a(n) 33 year old female who presents today as  a new patient for a FBSE and seborrheic dermatitis.    - Liyah reports having no concerns with her skin today - she denies having any spots on her skin that are new, growing, changing, tender, crusting, or bleeding.   - She denies having a personal or familial history of skin cancer. She reports having multiple blistering sunburns as a child and teenager. Reports using a tanning bed once. Has been using sunscreen routinely when outside.  - Liyah reports having longstanding seborrheic dermatitis. She has been using ketoconazole 2% shampoo weekly, and an apple cider vinegar rinse weekly currently for her symptoms currently. Has used T gel shampoo and other medicated shampoos in the past.    Patient is otherwise feeling well, without additional skin concerns.    Labs Reviewed:  N/A    Physical Exam:  Vitals: LMP 07/21/2023   SKIN: Full skin, which includes the head/face, both arms, chest, back, abdomen,both legs, genitalia and/or groin buttocks, digits and/or nails, was examined.  - There are dome shaped bright red papules on the trunk and extremities.   - Multiple regular brown pigmented macules and papules are identified on the face, trunk, and extremities.   - There is macular erythema of the frontal scalp with mild flaky white scale.  - Scattered brown macules on sun exposed areas..   - There is(are) skin colored pedunculated papules on the bilateral axillae.   - No other lesions of concern on areas examined.     Medications:  Current Outpatient Medications   Medication    ketoconazole (NIZORAL) 2 % external shampoo    levonorgestrel (MIRENA) 52 MG (20 mcg/day) IUD    sertraline (ZOLOFT) 50 MG tablet     No current facility-administered medications for this visit.      Past Medical History:   Patient Active Problem List   Diagnosis    CARDIOVASCULAR SCREENING; LDL GOAL LESS THAN 160    Chronic hypertension affecting pregnancy     Past Medical History:   Diagnosis Date    Depressive disorder 2020    Hypertension  2012    side effect of estrogen birth control, and during pregnancy    IUD (intrauterine device) in place 07/24/2023    Mirena    NO ACTIVE PROBLEMS      CC Sera Longoria MD  [address] [place] [address] on close of this encounter.

## 2023-09-21 NOTE — PATIENT INSTRUCTIONS

## 2024-02-23 ENCOUNTER — OFFICE VISIT (OUTPATIENT)
Dept: URGENT CARE | Facility: URGENT CARE | Age: 34
End: 2024-02-23
Payer: COMMERCIAL

## 2024-02-23 ENCOUNTER — E-VISIT (OUTPATIENT)
Dept: URGENT CARE | Facility: CLINIC | Age: 34
End: 2024-02-23
Payer: COMMERCIAL

## 2024-02-23 ENCOUNTER — ANCILLARY PROCEDURE (OUTPATIENT)
Dept: GENERAL RADIOLOGY | Facility: CLINIC | Age: 34
End: 2024-02-23
Attending: NURSE PRACTITIONER
Payer: COMMERCIAL

## 2024-02-23 VITALS
HEART RATE: 65 BPM | TEMPERATURE: 98.4 F | RESPIRATION RATE: 16 BRPM | SYSTOLIC BLOOD PRESSURE: 142 MMHG | OXYGEN SATURATION: 97 % | DIASTOLIC BLOOD PRESSURE: 100 MMHG

## 2024-02-23 DIAGNOSIS — R06.2 WHEEZING: ICD-10-CM

## 2024-02-23 DIAGNOSIS — R06.2 WHEEZING: Primary | ICD-10-CM

## 2024-02-23 DIAGNOSIS — R05.1 ACUTE COUGH: Primary | ICD-10-CM

## 2024-02-23 DIAGNOSIS — R03.0 ELEVATED BP WITHOUT DIAGNOSIS OF HYPERTENSION: ICD-10-CM

## 2024-02-23 PROCEDURE — 99214 OFFICE O/P EST MOD 30 MIN: CPT | Performed by: NURSE PRACTITIONER

## 2024-02-23 PROCEDURE — 99207 PR NON-BILLABLE SERV PER CHARTING: CPT | Performed by: NURSE PRACTITIONER

## 2024-02-23 PROCEDURE — 71046 X-RAY EXAM CHEST 2 VIEWS: CPT | Mod: TC | Performed by: RADIOLOGY

## 2024-02-23 RX ORDER — PREDNISONE 20 MG/1
40 TABLET ORAL DAILY
Qty: 10 TABLET | Refills: 0 | Status: SHIPPED | OUTPATIENT
Start: 2024-02-23 | End: 2024-07-02

## 2024-02-23 RX ORDER — ALBUTEROL SULFATE 90 UG/1
2 AEROSOL, METERED RESPIRATORY (INHALATION) EVERY 6 HOURS PRN
Qty: 18 G | Refills: 0 | Status: SHIPPED | OUTPATIENT
Start: 2024-02-23 | End: 2024-08-26

## 2024-02-23 NOTE — PROGRESS NOTES
Assessment & Plan     Acute cough  - XR Chest 2 Views  - predniSONE (DELTASONE) 20 MG tablet  Dispense: 10 tablet; Refill: 0  - albuterol (PROAIR HFA/PROVENTIL HFA/VENTOLIN HFA) 108 (90 Base) MCG/ACT inhaler  Dispense: 18 g; Refill: 0    Wheezing  - XR Chest 2 Views  - predniSONE (DELTASONE) 20 MG tablet  Dispense: 10 tablet; Refill: 0  - albuterol (PROAIR HFA/PROVENTIL HFA/VENTOLIN HFA) 108 (90 Base) MCG/ACT inhaler  Dispense: 18 g; Refill: 0    Elevated BP without diagnosis of hypertension  - XR Chest 2 Views  - predniSONE (DELTASONE) 20 MG tablet  Dispense: 10 tablet; Refill: 0  - albuterol (PROAIR HFA/PROVENTIL HFA/VENTOLIN HFA) 108 (90 Base) MCG/ACT inhaler  Dispense: 18 g; Refill: 0     Patient Instructions     Results for orders placed or performed in visit on 02/23/24   XR Chest 2 Views     Status: None (Preliminary result)    Narrative    CHEST TWO VIEWS   2/23/2024 12:23 PM     HISTORY: Acute cough. Wheezing. Elevated blood pressure without  diagnosis of hypertension.    COMPARISON: 12/25/2022.      Impression    IMPRESSION: No acute cardiopulmonary disease.      Prednisone daily x 5 days  Proair every 6 hours as needed for cough/SOB or wheezing.    Push fluids  Lots of handwashing.   Ibuprofen as needed for fever or pain  Delsym or dayquil/nyquil for cough as needed     Rest as able.   F/u in the clinic if symptoms persist or worsen.    BP uncontrolled.    Try to reduce sodium in your diet.    Will monitor at home  recommend follow up with PCP if remains elevated.        Return in about 1 week (around 3/1/2024) for with regular provider if symptoms persist.    Mari Aguilar, [facility] ELIZ Pelletier is a 33 year old female who presents to clinic today for the following health issues:  Chief Complaint   Patient presents with    Urgent Care     Coughing for about 2 weeks, SOB, wheezing- was using inhaler at home with some relief, 3-4 x/day - 1 day of fever  about 8 days ago      HPI      URI Adult    Onset of symptoms was 2 week(s) ago.  Course of illness is improving.    Severity moderate  Current and Associated symptoms: cough - non-productive, wheezing, and shortness of breath  Treatment measures tried include Decongestants, OTC Cough med, Fluids, and Rest.  Predisposing factors include HX of asthma.  Had a cold, asthma symptoms flare with URI.    Most cold symptoms have resolved, still feeling SOB and occasional wheezing.      Review of Systems  Constitutional, HEENT, cardiovascular, pulmonary, GI, , musculoskeletal, neuro, skin, endocrine and psych systems are negative, except as otherwise noted.      Objective    BP (!) 142/100   Pulse 65   Temp 98.4  F (36.9  C) (Tympanic)   Resp 16   LMP 02/06/2024 (Approximate)   SpO2 97%   Physical Exam   GENERAL: alert and no distress  EYES: Eyes grossly normal to inspection, PERRL and conjunctivae and sclerae normal  HENT: ear canals and TM's normal, nose and mouth without ulcers or lesions  NECK: no adenopathy, no asymmetry, masses, or scars  RESP: expiratory wheezes bibasilar and decreased breath sounds throughout  CV: regular rate and rhythm, normal S1 S2, no S3 or S4, no murmur, click or rub, no peripheral edema  ABDOMEN: soft, nontender, no hepatosplenomegaly, no masses and bowel sounds normal  MS: no gross musculoskeletal defects noted, no edema

## 2024-02-23 NOTE — PATIENT INSTRUCTIONS
Dear Liyah Rodriguez,    We are sorry you are not feeling well. Based on the responses you provided, it is recommended that you be seen in-person in urgent care so we can better evaluate your symptoms. Please click here to find the nearest urgent care location to you.   You will not be charged for this Visit. Thank you for trusting us with your care.    DALE Cordero CNP

## 2024-02-23 NOTE — PATIENT INSTRUCTIONS
Results for orders placed or performed in visit on 02/23/24   XR Chest 2 Views     Status: None (Preliminary result)    Narrative    CHEST TWO VIEWS   2/23/2024 12:23 PM     HISTORY: Acute cough. Wheezing. Elevated blood pressure without  diagnosis of hypertension.    COMPARISON: 12/25/2022.      Impression    IMPRESSION: No acute cardiopulmonary disease.      Prednisone daily x 5 days  Proair every 6 hours as needed for cough/SOB or wheezing.    Push fluids  Lots of handwashing.   Ibuprofen as needed for fever or pain  Delsym or dayquil/nyquil for cough as needed     Rest as able.   F/u in the clinic if symptoms persist or worsen.    BP uncontrolled.    Try to reduce sodium in your diet.    Will monitor at home  recommend follow up with PCP if remains elevated.

## 2024-03-13 ENCOUNTER — OFFICE VISIT (OUTPATIENT)
Dept: PHYSICAL MEDICINE AND REHAB | Facility: CLINIC | Age: 34
End: 2024-03-13
Payer: COMMERCIAL

## 2024-03-13 VITALS
DIASTOLIC BLOOD PRESSURE: 94 MMHG | WEIGHT: 231.8 LBS | HEIGHT: 63 IN | SYSTOLIC BLOOD PRESSURE: 144 MMHG | BODY MASS INDEX: 41.07 KG/M2

## 2024-03-13 DIAGNOSIS — R29.2 HYPERREFLEXIA: ICD-10-CM

## 2024-03-13 DIAGNOSIS — M54.2 NECK PAIN: Primary | ICD-10-CM

## 2024-03-13 DIAGNOSIS — R29.2: ICD-10-CM

## 2024-03-13 PROCEDURE — 99204 OFFICE O/P NEW MOD 45 MIN: CPT | Performed by: NURSE PRACTITIONER

## 2024-03-13 RX ORDER — METHYLPREDNISOLONE 4 MG
TABLET, DOSE PACK ORAL
Qty: 21 TABLET | Refills: 0 | Status: SHIPPED | OUTPATIENT
Start: 2024-03-13 | End: 2024-07-02

## 2024-03-13 ASSESSMENT — PAIN SCALES - GENERAL: PAINLEVEL: MILD PAIN (2)

## 2024-03-13 NOTE — PROGRESS NOTES
ASSESSMENT: Liyah Rodriguez is a 33 year old female presents for consultation at the request of PCP Sera Longoria, who presents today for new patient evaluation of:     -neck pain     Positive deuce radha and hyperreflexia on exam today. Given progressing neck pain and HA, recommend cervical MRI for further evaluation. If negative, would expect this is likely facet arthropathy-related pain and will start treatment as such with physical therapy for neck strengthening. Recommended medrol dose pack. Ok to continue massages. Would not recommend chiropractic care. We will call with cspine results. If no concerns, would recommend full course of PT and continuing an NSAID PRN once off of steroids for flares.         No data to display                     Diagnoses and all orders for this visit:  Neck pain  -     methylPREDNISolone (MEDROL DOSEPAK) 4 MG tablet therapy pack; Follow Package Directions  -     MR Cervical Spine w/o Contrast; Future  -     Physical Therapy  Referral; Future  Garnica's reflex positive  -     MR Cervical Spine w/o Contrast; Future  Hyperreflexia  -     MR Cervical Spine w/o Contrast; Future       PLAN:  Reviewed spine anatomy and disease process. Discussed diagnosis and treatment options with the patient today. A shared decision making model was used. The patient's values and choices were respected. The following represents what was discussed and decided upon by the provider and the patient.     -DIAGNOSTIC TESTS:  Images were personally reviewed and interpreted and explained to patient today using spine model.   --mri cspine     -PHYSICAL THERAPY:    -recommended PT   Discussed the importance of core strengthening, ROM, stretching exercises with the patient and how each of these entities is important in decreasing pain.  Explained to the patient that the purpose of physical therapy is to teach the patient a home exercise program.  These exercises need to be  performed every day in order to decrease pain and prevent future occurrences of pain.    -MEDICATIONS:    -medrol dose pack   -tylenol  -once off steroids ok to do otc NSAIDS   Discussed multiple medication options today with patient. Discussed risks, side effects, and proper use of medications. Patient verbalized understanding.    -INTERVENTIONS:    Patient would be a good candidate in the future for either epidural steroid injections or medial branch blocks if indicated based on symptoms and supported by imaging results.    -PATIENT EDUCATION: Total time of 40 minutes, on the day of service, spent with the patient, reviewing the chart, placing orders, and documenting.   -Today we also discussed the issues related to the pros and cons of the current treatment plan.    -FOLLOW-UP:  we will call with MRI results    Advised patient to call the [facility] if symptoms worsen or if they develop red flag symptoms such as numbness, weakness, severe pain uncontrolled by current pain med regimen, or any new or worsening problems controlling bladder and bowel function.   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 Feels sharp. Worse with extension. Stiffness/pain usually worse in the morning, but depends. Yesterday the pain got worse and worse over the course of the day. Rates 2-3/10 generally  but 5/10 at worst. She has been having posterior headaches, mild at least 1/2 of the days this week as well. Usually resolve by the next day. Denies fever, n/v/dizziness, changes in vision.    Does not radiate above the ears, down the arms.    No arm or leg pain, imbalance, changes in bowel or bladder  control, weakness, or numbness.    She has been taking tylenol moreso for the headaches and intermittent ibuprofen or naproxen with temporary relief. She has been getting massages which helps for a few days, then comes back.    She was on prednisone a month or more ago during a URI. Did not notice if chronic neck pain was better during this or not. Was quite ill    Chronic R shoulder stiffness/pain which is not worse with this     -Treatment to Date:     -Medications:  Tylenol  Ibuprofen  aleve    Current Outpatient Medications   Medication    methylPREDNISolone (MEDROL DOSEPAK) 4 MG tablet therapy pack    albuterol (PROAIR HFA/PROVENTIL HFA/VENTOLIN HFA) 108 (90 Base) MCG/ACT inhaler    fluocinolone acetonide (DERMA SMOOTHE/FS BODY) 0.01 % external oil    ketoconazole (NIZORAL) 2 % external shampoo    levonorgestrel (MIRENA) 52 MG (20 mcg/day) IUD    predniSONE (DELTASONE) 20 MG tablet    sertraline (ZOLOFT) 50 MG tablet     No current facility-administered medications for this visit.       Allergies   Allergen Reactions    Nkda [No Known Drug Allergy]        Past Medical History:   Diagnosis Date    Depressive disorder 2020    Hypertension 2012    side effect of estrogen birth control, and during pregnancy    IUD (intrauterine device) in place 07/24/2023    Mirena    NO ACTIVE PROBLEMS         Patient Active Problem List   Diagnosis    CARDIOVASCULAR SCREENING; LDL GOAL LESS THAN 160    Chronic hypertension affecting pregnancy       Past Surgical History:   Procedure Laterality Date    ACL repair  06/2008    DILATION AND CURETTAGE SUCTION N/A 5/24/2019    Procedure: Suction Dilation and Curettage   (9 Weeks and 5 Days);  Surgeon: Coni Barnett MD;  [facility]: [facility]    ORTHOPEDIC SURGERY Right     knee surgery    Right lateral menisectomy  06/2008    XR HYSTEROSALPINGOGRAM  02/08/2019    normal fill and spill       Family History   Problem Relation Age of Onset    Hypertension Mother     Cancer Father 50         "lymphoma    Aneurysm Father 63        spotty vision, occasional weakness prior- then sudden death, smoker    Hypertension Father     Cerebrovascular Disease Father          of brain aneurysm at age 63    Other Cancer Father         Non-hodgkins lymphoma    Hypertension Maternal Grandmother     High cholesterol Maternal Grandmother     Cancer Maternal Grandfather         lung cancer    Heart Disease Paternal Grandmother         CHF    Melanoma No family hx of     Skin Cancer No family hx of        Reviewed past medical, surgical, and family history with patient found on new patient intake packet located in EMR Media tab.     SOCIAL HX: alcohol use, nonsmoker, no heavy drinking, no rec drug use    Oswestry (JOON) Questionnaire:         No data to display                Neck Disability Index:       No data to display                   PHQ-2 Score:       3/13/2024     8:59 AM 2023     3:40 PM   PHQ-2 (  [employer])   Q1: Little interest or pleasure in doing things 0 1   Q2: Feeling down, depressed or hopeless 0 1   PHQ-2 Score 0 2   Q1: Little interest or pleasure in doing things  Several days   Q2: Feeling down, depressed or hopeless  Several days   PHQ-2 Score  2          ROS: positive for headache, joint pain, muscle pain. Specifically negative for bowel/bladder dysfunction, balance changes, headache, dizziness, foot drop, fevers, chills, appetite changes, nausea/vomiting, unexplained weight loss. Otherwise 13 systems reviewed are negative. Please see the patient's intake questionnaire from today for details.    OBJECTIVE:  BP (!) 144/94   Ht 5' 3\" (1.6 m)   Wt 231 lb 12.8 oz (105.1 kg)   LMP 2024 (Approximate)   BMI 41.06 kg/m      PHYSICAL EXAMINATION:  --CONSTITUTIONAL: Vital signs as above. No acute distress. The patient is well nourished and well groomed.  --PSYCHIATRIC: The patient is awake, alert, oriented to person, place, and time, and answering questions appropriately with clear speech. " Appropriate mood and affect   --HEENT: Sclera are non-injected. Extraocular muscles are intact. Moist oral mucosa.  --SKIN: Skin over the face, bilateral upper extremities, and posterior torso is clean, dry, intact without rashes.  --RESPIRATORY: Normal rhythm and effort. No abnormal accessory muscle breathing patterns noted.     --NEUROLOGIC: CN III-XII are grossly intact.   --GROSS MOTOR: Easily arises from a seated position. Toe walking, heel walking, and tandem gait are normal.      --UPPER EXTREMITY MOTOR TESTING:  Shoulder abduction: right 5/5, left 5/5  Triceps: right 5/5 left 5/5  Biceps:right 5/5  left 5/5,   Hand :  right 5/5  left 5/5,   Intrinsics: right 5/5  left 5/5,   Extensors: right 5/5  left 5/5,     --LOWER EXTREMITY MOTOR TESTING  Hip flexion: right 5/5  left 5/5,   Quads:right 5/5  left 5/5,   Hamstrings: right 5/5  left 5/5,   Dorsiflexion: right 5/5  left 5/5,   Plantarflexion: right 5/5  left 5/5,   EHL: right 5/5  left 5/5,     REFLEXES: brisk/4 symmetric triceps, biceps, brachioradialis bilaterally. Brisk/4 symmetric patellar, achilles reflex bilaterally.  Negative Clonus, Babinski.  positive Garnica's bilaterally.      SENSATION: of the upper and lower extremities is intact to light touch.   --VASCULAR: Warm upper and lower limbs bilaterally.     --CERVICAL SPINE: Inspection reveals no evidence of deformity or swelling. Range of motion is mildly limited in L >R rotation. Otherwise intact but some discomfort with flexion, extension, head tilt. No point tenderness to palpation of cervical spine. No tenderness to palpation of traps, scaps, or paraspinal musculature.    --SHOULDERS: Full range of motion of R shoulder.         RESULTS:   Prior medical records from [facility] and Care Everywhere were reviewed today.         IMAGING:  No spine specific imaging         Deedee Mota FNP-C  [facility]  O. [phone]

## 2024-03-13 NOTE — LETTER
3/13/2024         RE: Liyah Rodriguez  [address]  [place] [address]        Dear Colleague,    Thank you for referring your patient, Liyah Rodriguez, to the [facility]. Please see a copy of my visit note below.    ASSESSMENT: Liyah Rodriguez is a 33 year old female presents for consultation at the request of PCP Sera Longoria, who presents today for new patient evaluation of:     -neck pain     Positive deuce radha and hyperreflexia on exam today. Given progressing neck pain and HA, recommend cervical MRI for further evaluation. If negative, would expect this is likely facet arthropathy-related pain and will start treatment as such with physical therapy for neck strengthening. Recommended medrol dose pack. Ok to continue massages. Would not recommend chiropractic care. We will call with cspine results. If no concerns, would recommend full course of PT and continuing an NSAID PRN once off of steroids for flares.         No data to display                     Diagnoses and all orders for this visit:  Neck pain  -     methylPREDNISolone (MEDROL DOSEPAK) 4 MG tablet therapy pack; Follow Package Directions  -     MR Cervical Spine w/o Contrast; Future  -     Physical Therapy  Referral; Future  Garnica's reflex positive  -     MR Cervical Spine w/o Contrast; Future  Hyperreflexia  -     MR Cervical Spine w/o Contrast; Future       PLAN:  Reviewed spine anatomy and disease process. Discussed diagnosis and treatment options with the patient today. A shared decision making model was used. The patient's values and choices were respected. The following represents what was discussed and decided upon by the provider and the patient.     -DIAGNOSTIC TESTS:  Images were personally reviewed and interpreted and explained to patient today using spine model.   --mri cspine     -PHYSICAL THERAPY:    -recommended PT   Discussed the  importance of core strengthening, ROM, stretching exercises with the patient and how each of these entities is important in decreasing pain.  Explained to the patient that the purpose of physical therapy is to teach the patient a home exercise program.  These exercises need to be performed every day in order to decrease pain and prevent future occurrences of pain.    -MEDICATIONS:    -medrol dose pack   -tylenol  -once off steroids ok to do otc NSAIDS   Discussed multiple medication options today with patient. Discussed risks, side effects, and proper use of medications. Patient verbalized understanding.    -INTERVENTIONS:    Patient would be a good candidate in the future for either epidural steroid injections or medial branch blocks if indicated based on symptoms and supported by imaging results.    -PATIENT EDUCATION: Total time of 40 minutes, on the day of service, spent with the patient, reviewing the chart, placing orders, and documenting.   -Today we also discussed the issues related to the pros and cons of the current treatment plan.    -FOLLOW-UP:  we will call with MRI results    Advised patient to call the [facility] Center if symptoms worsen or if they develop red flag symptoms such as numbness, weakness, severe pain uncontrolled by current pain med regimen, or any new or worsening problems controlling bladder and bowel function.   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 Feels sharp. Worse with extension. Stiffness/pain usually worse in the morning, but depends. Yesterday the pain got worse and worse over the course of the day. Rates 2-3/10 generally  but 5/10 at worst. She has  been having posterior headaches, mild at least 1/2 of the days this week as well. Usually resolve by the next day. Denies fever, n/v/dizziness, changes in vision.    Does not radiate above the ears, down the arms.    No arm or leg pain, imbalance, changes in bowel or bladder control, weakness, or numbness.    She has been taking tylenol moreso for the headaches and intermittent ibuprofen or naproxen with temporary relief. She has been getting massages which helps for a few days, then comes back.    She was on prednisone a month or more ago during a URI. Did not notice if chronic neck pain was better during this or not. Was quite ill    Chronic R shoulder stiffness/pain which is not worse with this     -Treatment to Date:     -Medications:  Tylenol  Ibuprofen  aleve    Current Outpatient Medications   Medication     methylPREDNISolone (MEDROL DOSEPAK) 4 MG tablet therapy pack     albuterol (PROAIR HFA/PROVENTIL HFA/VENTOLIN HFA) 108 (90 Base) MCG/ACT inhaler     fluocinolone acetonide (DERMA SMOOTHE/FS BODY) 0.01 % external oil     ketoconazole (NIZORAL) 2 % external shampoo     levonorgestrel (MIRENA) 52 MG (20 mcg/day) IUD     predniSONE (DELTASONE) 20 MG tablet     sertraline (ZOLOFT) 50 MG tablet     No current facility-administered medications for this visit.       Allergies   Allergen Reactions     Nkda [No Known Drug Allergy]        Past Medical History:   Diagnosis Date     Depressive disorder 2020     Hypertension 2012    side effect of estrogen birth control, and during pregnancy     IUD (intrauterine device) in place 07/24/2023    Mirena     NO ACTIVE PROBLEMS         Patient Active Problem List   Diagnosis     CARDIOVASCULAR SCREENING; LDL GOAL LESS THAN 160     Chronic hypertension affecting pregnancy       Past Surgical History:   Procedure Laterality Date     ACL repair  06/2008     DILATION AND CURETTAGE SUCTION N/A 5/24/2019    Procedure: Suction Dilation and Curettage   (9 Weeks and 5 Days);   "Surgeon: Coni Barnett MD;  [facility]     ORTHOPEDIC SURGERY Right     knee surgery     Right lateral menisectomy  2008     XR HYSTEROSALPINGOGRAM  2019    normal fill and spill       Family History   Problem Relation Age of Onset     Hypertension Mother      Cancer Father 50        lymphoma     Aneurysm Father 63        spotty vision, occasional weakness prior- then sudden death, smoker     Hypertension Father      Cerebrovascular Disease Father          of brain aneurysm at age 63     Other Cancer Father         Non-hodgkins lymphoma     Hypertension Maternal Grandmother      High cholesterol Maternal Grandmother      Cancer Maternal Grandfather         lung cancer     Heart Disease Paternal Grandmother         CHF     Melanoma No family hx of      Skin Cancer No family hx of        Reviewed past medical, surgical, and family history with patient found on new patient intake packet located in EMR Media tab.     SOCIAL HX: alcohol use, nonsmoker, no heavy drinking, no rec drug use    Oswestry (JOON) Questionnaire:         No data to display                Neck Disability Index:       No data to display                   PHQ-2 Score:       3/13/2024     8:59 AM 2023     3:40 PM   PHQ-2 (  [employer])   Q1: Little interest or pleasure in doing things 0 1   Q2: Feeling down, depressed or hopeless 0 1   PHQ-2 Score 0 2   Q1: Little interest or pleasure in doing things  Several days   Q2: Feeling down, depressed or hopeless  Several days   PHQ-2 Score  2          ROS: positive for headache, joint pain, muscle pain. Specifically negative for bowel/bladder dysfunction, balance changes, headache, dizziness, foot drop, fevers, chills, appetite changes, nausea/vomiting, unexplained weight loss. Otherwise 13 systems reviewed are negative. Please see the patient's intake questionnaire from today for details.    OBJECTIVE:  BP (!) 144/94   Ht 5' 3\" (1.6 m)   Wt 231 lb 12.8 oz (105.1 kg)   LMP " 02/06/2024 (Approximate)   BMI 41.06 kg/m      PHYSICAL EXAMINATION:  --CONSTITUTIONAL: Vital signs as above. No acute distress. The patient is well nourished and well groomed.  --PSYCHIATRIC: The patient is awake, alert, oriented to person, place, and time, and answering questions appropriately with clear speech. Appropriate mood and affect   --HEENT: Sclera are non-injected. Extraocular muscles are intact. Moist oral mucosa.  --SKIN: Skin over the face, bilateral upper extremities, and posterior torso is clean, dry, intact without rashes.  --RESPIRATORY: Normal rhythm and effort. No abnormal accessory muscle breathing patterns noted.     --NEUROLOGIC: CN III-XII are grossly intact.   --GROSS MOTOR: Easily arises from a seated position. Toe walking, heel walking, and tandem gait are normal.      --UPPER EXTREMITY MOTOR TESTING:  Shoulder abduction: right 5/5, left 5/5  Triceps: right 5/5 left 5/5  Biceps:right 5/5  left 5/5,   Hand :  right 5/5  left 5/5,   Intrinsics: right 5/5  left 5/5,   Extensors: right 5/5  left 5/5,     --LOWER EXTREMITY MOTOR TESTING  Hip flexion: right 5/5  left 5/5,   Quads:right 5/5  left 5/5,   Hamstrings: right 5/5  left 5/5,   Dorsiflexion: right 5/5  left 5/5,   Plantarflexion: right 5/5  left 5/5,   EHL: right 5/5  left 5/5,     REFLEXES: brisk/4 symmetric triceps, biceps, brachioradialis bilaterally. Brisk/4 symmetric patellar, achilles reflex bilaterally.  Negative Clonus, Babinski.  positive Garnica's bilaterally.      SENSATION: of the upper and lower extremities is intact to light touch.   --VASCULAR: Warm upper and lower limbs bilaterally.     --CERVICAL SPINE: Inspection reveals no evidence of deformity or swelling. Range of motion is mildly limited in L >R rotation. Otherwise intact but some discomfort with flexion, extension, head tilt. No point tenderness to palpation of cervical spine. No tenderness to palpation of traps, scaps, or paraspinal  musculature.    --SHOULDERS: Full range of motion of R shoulder.         RESULTS:   Prior medical records from [facility] and Care Everywhere were reviewed today.         IMAGING:  No spine specific imaging         Deedee ZHAO  [facility]  O. [phone]      Again, thank you for allowing me to participate in the care of your patient.        Sincerely,        DALE Campbell CNP

## 2024-03-13 NOTE — PATIENT INSTRUCTIONS
~You have been referred for Physical Therapy to [facility]. They will call you to schedule an appointment.      Scheduling phone number is [phone] for [facility], or [place] location.  If you have not heard from the scheduling office within 2 business days, please call [phone] for ALL other locations.    Discussed the importance of core strengthening, ROM, stretching exercises and how each of these entities is important in decreasing pain and improving long term spine health.  The purpose of physical therapy is to teach you an individualized home exercise program.  These exercises need to be performed every day in order to decrease pain and prevent future occurrences of pain.           Medrol Dose Pack (Prednisone Taper) has been prescribed today for your acute pain.  Please follow the directions on package.  Do not take with NSAIDs (ibuprofen, aleve, nabumetone, diclofenac).  OK to take with tylenol (extra strength or extended release/8hour) over the counter as needed.      POSSIBLE STEROID SIDE EFFECTS :  -If you experience these, it should only last for a short period-   Change in menstrual flow   Swelling   Increased appetite   Skin redness (flushness)   Skin rash   Mouth (oral) irritation   Blood sugar (glucose) levels   Sweats   Mood changes       Ok to continue tylenol  Ok to resume NSAIDs after you are finished with steroids      Imaging (cervical MRI) has been ordered today.   Radiology will call you to schedule. Please call below if you do not hear from them in the next couple of days.     [facility] Scheduling:  Please call [phone] to schedule your image(s) (select option #1).    There are 3 different locations:    [facility]  [address] [place] [address] Imaging - [place]  [address]   [facility] [address]    [address]  [facility] [address]         ~Please call our [facility] Nurse Navigation line [phone] with any questions or concerns about your treatment plan, if symptoms worsen and you would like to be seen urgently, or if you have any new or worsening numbness, weakness, or problems controlling bladder and bowel function.  ~You are also welcome to contact Deedee Mota via Hi[employer], but please be aware that responses to Hi[employer] message may take 2-3 days due to the high volume of patients seen in clinic.

## 2024-03-22 ENCOUNTER — HOSPITAL ENCOUNTER (OUTPATIENT)
Dept: MRI IMAGING | Facility: CLINIC | Age: 34
Discharge: HOME | End: 2024-03-22
Attending: NURSE PRACTITIONER | Admitting: NURSE PRACTITIONER
Payer: COMMERCIAL

## 2024-03-22 DIAGNOSIS — M54.2 NECK PAIN: ICD-10-CM

## 2024-03-22 DIAGNOSIS — R29.2: ICD-10-CM

## 2024-03-22 DIAGNOSIS — R29.2 HYPERREFLEXIA: ICD-10-CM

## 2024-03-22 PROCEDURE — 72141 MRI NECK SPINE W/O DYE: CPT

## 2024-03-25 ENCOUNTER — THERAPY VISIT (OUTPATIENT)
Dept: PHYSICAL THERAPY | Facility: CLINIC | Age: 34
End: 2024-03-25
Attending: NURSE PRACTITIONER
Payer: COMMERCIAL

## 2024-03-25 DIAGNOSIS — M54.2 NECK PAIN: Primary | ICD-10-CM

## 2024-03-25 PROCEDURE — 97110 THERAPEUTIC EXERCISES: CPT | Mod: GP | Performed by: PHYSICAL THERAPIST

## 2024-03-25 PROCEDURE — 97161 PT EVAL LOW COMPLEX 20 MIN: CPT | Mod: GP | Performed by: PHYSICAL THERAPIST

## 2024-03-25 NOTE — PROGRESS NOTES
PHYSICAL THERAPY EVALUATION  Type of Visit: Evaluation    See electronic medical record for Abuse and Falls Screening details.    Subjective       Presenting condition or subjective complaint: Neck Stiffness and painStiffness in the neck that began around 1 year ago.  She thought it was just carrying around a heavy toddler.  Stiffness has been getting progressively worse and was painful.  She went to the spine clinic and got a med-dose pack.  Last pill taken 3/20/2024.  Feeling better. Had the MRI on Friday and has not connected with Deedee Mota for her MRI results. No pain at this time and motion is better.  She is still restricted but better.  She had been having some mild headaches, however this is better.  She has had 1-2 headaches in the last week after finishing her med dose pack. Previously had headaches daily. Denies numbness tingling.   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 She is not having pain with work. Patient feels she is hypermobile. Patient has a lot of stress in her job and is seeing a Therapist to work on this.  Her current strategy for stress management is de-cluttering her house.     Date of onset: 03/13/24 (Chronic condition MD order date listed.)    Relevant medical history: Depression; High blood pressure; Overweight (history of pregnant or breastfeeding.)   Dates & types of surgery: R ACL repair and menisectomry 2008    Prior diagnostic imaging/testing results: MRI results have not been reviewed by Spine provider with patient yet.   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   ROM:  Cervical AROM   Motion    Extension 48 some tightness in the left side of neck   Flexion 55 no symptoms     Right  Left   Rotation 75 tightness on left 60 tightness/pinching on Left   Side Bending 30 tightness on left 30 pinching on left   Palpation: tightness and slight discomfort to palpation of B UT Left greater than Right.   Special Clearing Tests:   Transverse Ligament Clear   Alar Ligaments Clear   Myotomes:  Motion/Spinal Level Right  Left   Shrug (C4) 5/5 5/5   Shoulder Abd. (C5) 5/5 5/5   Elbow Flexion (C6) 5/5 5/5   Elbow Flexion (C7) 5/5 5/5   Thumb Extension (C8) 5/5 5/5   Finger Abduction (T1) 5/5 5/5   Reflexes: Upper extremities Biceps, Triceps, Brachioradialis 2+, LE patellar reflexes 3+, 2-3 beats clonus through achilles.   Joint Mobility:  Joint mobility not assessed this date as patients MRI has not been reviewed with her referring provider yet.  Will assess next session if Spine provider feels it is appropriate.       Assessment & Plan   CLINICAL IMPRESSIONS  Medical Diagnosis: Neck pain (M54.2)    Treatment Diagnosis: Neck pain (M54.2)   Impression/Assessment: Patient is a 33 year old female with Neck complaints.  The following significant findings have been identified: Pain, Decreased ROM/flexibility, Impaired muscle performance, Decreased activity tolerance, and Impaired posture. These impairments interfere with their ability to perform self care tasks, recreational activities, driving , and community mobility as compared to previous level of function.     Clinical Decision Making (Complexity):  Clinical Presentation: Stable/Uncomplicated  Clinical Presentation Rationale: based on medical and personal factors listed in PT evaluation  Clinical Decision Making (Complexity): Low  complexity    PLAN OF CARE  Treatment Interventions:  Modalities: Cryotherapy, Hot Pack, taping  Interventions: Manual Therapy, Neuromuscular Re-education, Therapeutic Activity, Therapeutic Exercise, Self-Care/Home Management    Long Term Goals     PT Goal 1  Goal Identifier: Driving  Goal Description: Patient will demonstrate full pain free cervical Range of motion in order to check blind spot for safe driving.  Target Date: 05/20/24      Frequency of Treatment: 1 time per week  Duration of Treatment: 8 weeks    Education Assessment:   Learner/Method: Patient;Listening;Demonstration;Reading;Pictures/Video;No Barriers to Learning  Education Comments: Educated on findings of exam and likely frequency/duration of PT POC.    Risks and benefits of evaluation/treatment have been explained.   Patient/Family/caregiver agrees with Plan of Care.     Evaluation Time:     PT Eval, Low Complexity Minutes (36265): 20     Signing Clinician: Josephine Lambert, PT, DPT, CMT, OCS

## 2024-03-26 ENCOUNTER — TELEPHONE (OUTPATIENT)
Dept: PHYSICAL MEDICINE AND REHAB | Facility: CLINIC | Age: 34
End: 2024-03-26
Payer: COMMERCIAL

## 2024-03-26 NOTE — TELEPHONE ENCOUNTER
Call placed to patient with provider's results and recommendations.  Left message to return call.  Also sent patient MyChart message with info.

## 2024-03-26 NOTE — TELEPHONE ENCOUNTER
----- Message from DALE Green CNP sent at 3/26/2024  8:58 AM CDT -----  Please let Liyah know that I reviewed her cervical MRI and it looks like she has two disc bulges which are causing some narrowing around the nerves and within the spinal canal, but not to a severe degree. She does not appear to have much wear and tear of the joints. She has a somewhat straightened posture of the neck which could be related to muscle spasm. I would recommend starting physical therapy and following up with me in clinic if still having symptoms after a full course. She can return in person if she would like to review images at that time, or sooner to help visualize if desired

## 2024-03-27 NOTE — TELEPHONE ENCOUNTER
"Phone call to patient to patient to relay that there are \"No restrictions from my perspective\" per message from PSP DALE Campbell, CNP in regards to patient's MyChart communication. Left detailed message on dedicated voicemail.     Encouraged pt to call nurse navigation line if questions regarding the message left.  "

## 2024-04-02 ENCOUNTER — THERAPY VISIT (OUTPATIENT)
Dept: PHYSICAL THERAPY | Facility: CLINIC | Age: 34
End: 2024-04-02
Payer: COMMERCIAL

## 2024-04-02 DIAGNOSIS — M54.2 NECK PAIN: Primary | ICD-10-CM

## 2024-04-02 PROCEDURE — 97140 MANUAL THERAPY 1/> REGIONS: CPT | Mod: GP | Performed by: PHYSICAL THERAPIST

## 2024-04-02 PROCEDURE — 97110 THERAPEUTIC EXERCISES: CPT | Mod: GP | Performed by: PHYSICAL THERAPIST

## 2024-04-02 PROCEDURE — 97112 NEUROMUSCULAR REEDUCATION: CPT | Mod: GP | Performed by: PHYSICAL THERAPIST

## 2024-04-10 ENCOUNTER — THERAPY VISIT (OUTPATIENT)
Dept: PHYSICAL THERAPY | Facility: CLINIC | Age: 34
End: 2024-04-10
Payer: COMMERCIAL

## 2024-04-10 DIAGNOSIS — M54.2 NECK PAIN: Primary | ICD-10-CM

## 2024-04-10 PROCEDURE — 97112 NEUROMUSCULAR REEDUCATION: CPT | Mod: GP | Performed by: PHYSICAL THERAPIST

## 2024-04-10 PROCEDURE — 97110 THERAPEUTIC EXERCISES: CPT | Mod: GP | Performed by: PHYSICAL THERAPIST

## 2024-04-10 PROCEDURE — 97140 MANUAL THERAPY 1/> REGIONS: CPT | Mod: GP | Performed by: PHYSICAL THERAPIST

## 2024-04-18 ENCOUNTER — THERAPY VISIT (OUTPATIENT)
Dept: PHYSICAL THERAPY | Facility: CLINIC | Age: 34
End: 2024-04-18
Payer: COMMERCIAL

## 2024-04-18 DIAGNOSIS — M54.2 NECK PAIN: Primary | ICD-10-CM

## 2024-04-18 PROCEDURE — 97112 NEUROMUSCULAR REEDUCATION: CPT | Mod: GP | Performed by: PHYSICAL THERAPIST

## 2024-04-18 PROCEDURE — 97140 MANUAL THERAPY 1/> REGIONS: CPT | Mod: GP | Performed by: PHYSICAL THERAPIST

## 2024-04-18 PROCEDURE — 97110 THERAPEUTIC EXERCISES: CPT | Mod: GP | Performed by: PHYSICAL THERAPIST

## 2024-04-25 ENCOUNTER — THERAPY VISIT (OUTPATIENT)
Dept: PHYSICAL THERAPY | Facility: CLINIC | Age: 34
End: 2024-04-25
Payer: COMMERCIAL

## 2024-04-25 DIAGNOSIS — M54.2 NECK PAIN: Primary | ICD-10-CM

## 2024-04-25 PROCEDURE — 97140 MANUAL THERAPY 1/> REGIONS: CPT | Mod: GP | Performed by: PHYSICAL THERAPIST

## 2024-04-25 PROCEDURE — 97110 THERAPEUTIC EXERCISES: CPT | Mod: GP | Performed by: PHYSICAL THERAPIST

## 2024-05-09 ENCOUNTER — THERAPY VISIT (OUTPATIENT)
Dept: PHYSICAL THERAPY | Facility: CLINIC | Age: 34
End: 2024-05-09
Payer: COMMERCIAL

## 2024-05-09 DIAGNOSIS — M54.2 NECK PAIN: Primary | ICD-10-CM

## 2024-05-09 PROCEDURE — 97140 MANUAL THERAPY 1/> REGIONS: CPT | Mod: GP | Performed by: PHYSICAL THERAPIST

## 2024-05-09 PROCEDURE — 97110 THERAPEUTIC EXERCISES: CPT | Mod: GP | Performed by: PHYSICAL THERAPIST

## 2024-05-10 PROBLEM — M54.2 NECK PAIN: Status: RESOLVED | Noted: 2024-03-25 | Resolved: 2024-05-10

## 2024-05-10 NOTE — PROGRESS NOTES
"   05/09/24 0500   Appointment Info   Signing clinician's name / credentials Josephine Lambert, PT, DPT, CMT, OCS   Total/Authorized Visits 8PT poc(MD: E&T)   Visits Used 6   Medical Diagnosis Neck pain (M54.2)   PT Tx Diagnosis Neck pain (M54.2)   Other pertinent information Per MD:\"neck pain, eval treat, range of motion, aggressive paraspinal musculature strengthening, myofascial release\"   Progress Note/Certification   Onset of illness/injury or Date of Surgery 03/13/24  (Chronic condition MD order date listed.)   Therapy Frequency 1 time per week   Predicted Duration 8 weeks   Progress Note Due Date 05/20/24   Progress Note Completed Date 03/25/24   PT Goal 1   Goal Identifier Driving   Goal Description Patient will demonstrate full pain free cervical Range of motion in order to check blind spot for safe driving.   Goal Progress met   Target Date 05/20/24   Date Met 05/10/24   Subjective Report   Subjective Report Neck is feeling pretty good. No tingling and ROM is improving. She has had occaional headaches ranges around 2-3/10 and goes away with medication.  Patient feels 90-95% improvement overall and feels that she will be able to make it the rest of the way with her exercises.   Objective Measures   Objective Measures Objective Measure 1;Objective Measure 2   Objective Measure 1   Objective Measure ROM   Details 85 degrees rotation B.   Objective Measure 2   Objective Measure Palpation   Details some general tightness, however not pain ful this date.   Treatment Interventions (PT)   Interventions Therapeutic Procedure/Exercise;Manual Therapy;Neuromuscular Re-education   Therapeutic Procedure/Exercise   Therapeutic Procedures: strength, endurance, ROM, flexibility minutes (00675) 20   PTRx Ther Proc 1 Warmth   PTRx Ther Proc 1 - Details instructed to continue as needed.   PTRx Ther Proc 2 Ball Massage   PTRx Ther Proc 2 - Details instructed to continue as needed.   PTRx Ther Proc 3 Cervical Retraction With Patient " Overpressure   PTRx Ther Proc 3 - Details 1 x 5 reps for review   PTRx Ther Proc 4 Scapular Retraction/Depression   PTRx Ther Proc 4 - Details 1 x 10 reps   PTRx Ther Proc 5 Scalene Stretch   PTRx Ther Proc 5 - Details verbal review to use if needed.   PTRx Ther Proc 6 Cervical ROM Rotation   PTRx Ther Proc 6 - Details verbal review to use if needed   PTRx Ther Proc 7 Pec Corner Stretch   PTRx Ther Proc 7 - Details 2 x 30 sec hold   PTRx Ther Proc 8 Bent over row   PTRx Ther Proc 8 - Details 10lbs 1 x 15 reps with verbal and tactile cues to prevent shoulder shrug.   PTRx Ther Proc 10 Prone Scapula I   PTRx Ther Proc 10 - Details 1 x 15 reps   Patient Response/Progress no pain and some fatigue   Manual Therapy   Manual Therapy: Mobilization, MFR, MLD, friction massage minutes (85027) 10   Manual Therapy Manual Therapy 4   Manual Therapy 1 STM/MFR   Manual Therapy 1 - Details Left Cervical paraspinals C4-6   Manual Therapy 2 1st rib inferior mob   Manual Therapy 2 - Details Left grade II-III   Skilled Intervention good response   Patient Response/Progress improved and tolerated well by patient   Education   Learner/Method Patient;Listening;Demonstration;Reading;Pictures/Video;No Barriers to Learning   Plan   Home program see PTrx   Plan for next session discharge with instructions to patient to reach out to physical therapist if any questions arise.   Total Session Time   Timed Code Treatment Minutes 30   Total Treatment Time (sum of timed and untimed services) 30         DISCHARGE  Reason for Discharge: Patient has met all goals.    Equipment Issued: none    Discharge Plan: Patient to continue home program.    Referring Provider:  Deedee Mota

## 2024-07-01 SDOH — HEALTH STABILITY: PHYSICAL HEALTH: ON AVERAGE, HOW MANY DAYS PER WEEK DO YOU ENGAGE IN MODERATE TO STRENUOUS EXERCISE (LIKE A BRISK WALK)?: 2 DAYS

## 2024-07-01 SDOH — HEALTH STABILITY: PHYSICAL HEALTH: ON AVERAGE, HOW MANY MINUTES DO YOU ENGAGE IN EXERCISE AT THIS LEVEL?: 60 MIN

## 2024-07-01 ASSESSMENT — SOCIAL DETERMINANTS OF HEALTH (SDOH): HOW OFTEN DO YOU GET TOGETHER WITH FRIENDS OR RELATIVES?: ONCE A WEEK

## 2024-07-02 ENCOUNTER — OFFICE VISIT (OUTPATIENT)
Dept: PEDIATRICS | Facility: CLINIC | Age: 34
End: 2024-07-02
Payer: COMMERCIAL

## 2024-07-02 VITALS
HEART RATE: 96 BPM | RESPIRATION RATE: 14 BRPM | WEIGHT: 235.4 LBS | HEIGHT: 63 IN | TEMPERATURE: 97.8 F | BODY MASS INDEX: 41.71 KG/M2 | OXYGEN SATURATION: 98 % | SYSTOLIC BLOOD PRESSURE: 140 MMHG | DIASTOLIC BLOOD PRESSURE: 84 MMHG

## 2024-07-02 DIAGNOSIS — R06.83 SNORING: ICD-10-CM

## 2024-07-02 DIAGNOSIS — Z00.00 ROUTINE GENERAL MEDICAL EXAMINATION AT A HEALTH CARE FACILITY: Primary | ICD-10-CM

## 2024-07-02 DIAGNOSIS — R53.83 OTHER FATIGUE: ICD-10-CM

## 2024-07-02 DIAGNOSIS — I10 BENIGN ESSENTIAL HYPERTENSION: ICD-10-CM

## 2024-07-02 PROCEDURE — 99214 OFFICE O/P EST MOD 30 MIN: CPT | Mod: 25 | Performed by: STUDENT IN AN ORGANIZED HEALTH CARE EDUCATION/TRAINING PROGRAM

## 2024-07-02 PROCEDURE — 99395 PREV VISIT EST AGE 18-39: CPT | Performed by: STUDENT IN AN ORGANIZED HEALTH CARE EDUCATION/TRAINING PROGRAM

## 2024-07-02 RX ORDER — LOSARTAN POTASSIUM 25 MG/1
25 TABLET ORAL DAILY
Qty: 90 TABLET | Refills: 4 | Status: SHIPPED | OUTPATIENT
Start: 2024-07-02

## 2024-07-02 ASSESSMENT — PAIN SCALES - GENERAL: PAINLEVEL: NO PAIN (0)

## 2024-07-02 NOTE — PROGRESS NOTES
"Preventive Care Visit  [facility] Deirdre E. Milligan, MD, Internal Medicine - Pediatrics  Jul 2, 2024      Assessment & Plan     Routine general medical examination at a health care facility  - Comprehensive metabolic panel (BMP + Alb, Alk Phos, ALT, AST, Total. Bili, TP); Standing    Benign essential hypertension  Had elevated blood pressure in pregnancy, and recent persistent elevated blood pressure. Recommend start losartan (Cozaar) 25mg, check blood pressure at home, and I will MyChart message in 3 weeks to check in. Patient will return to labs.  - losartan (COZAAR) 25 MG tablet; Take 1 tablet (25 mg) by mouth daily    Snoring  Concern for obstructive sleep apnea which may be contributing to elevated blood pressure readings. Recommend sleep medicine evaluation for possible sleep study.  - Adult Sleep Eval & Management  Referral; Future    Other fatigue  See above.  - Adult Sleep Eval & Management  Referral; Future            BMI  Estimated body mass index is 41.7 kg/m  as calculated from the following:    Height as of this encounter: 1.6 m (5' 3\").    Weight as of this encounter: 106.8 kg (235 lb 6.4 oz).       Counseling  Appropriate preventive services were discussed with this patient, including applicable screening as appropriate for fall prevention, nutrition, physical activity, Tobacco-use cessation, weight loss and cognition.  Checklist reviewing preventive services available has been given to the patient.  Reviewed patient's diet, addressing concerns and/or questions.   She is at risk for lack of exercise and has been provided with information to increase physical activity for the benefit of her well-being.           Tulio Pelletier is a 34 year old, presenting for the following:  Physical        7/2/2024    11:07 AM   Additional Questions   Roomed by MR   Accompanied by NA         7/2/2024    11:07 AM   Patient Reported Additional Medications   Patient reports " taking the following new medications NA        Health Care Directive  Patient does not have a Health Care Directive or Living Will: Discussed advance care planning with patient; however, patient declined at this time.    HPI    Concerns    Sleep apnea = Pt has known she snores and the last couple months she was told by her partner that she has sleep apnea  Referral for sleep study. She notices that she is fatigue during the day.    Blood pressure= she has borderline HTN issues for awhile and then had pregnancy HTN and was put on med for that.  Last visit with an UC she had HTN as well so she wants to follow up on that. 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 (!) FAIR   Feel stress (tense, anxious, or unable to sleep) Rather much      (!) STRESS CONCERN      7/1/2024   Nutrition   Three or more servings of calcium each day? (!) NO   Diet: Regular (no restrictions)   How many servings of fruit and vegetables per day? (!) 2-3   How many sweetened beverages each day? 0-1            7/1/2024   Exercise   Days per week of moderate/strenous exercise 2 days   Average minutes spent exercising at this level 60 min      (!) EXERCISE CONCERN      7/1/2024   Social Factors   Frequency of gathering with friends or relatives Once a week   Worry food won't last until get money to buy more No   Food not last or not have enough money for food? No   Do you have housing? (Housing is defined as stable permanent housing and does not include staying ouside in a car, in a tent, in an abandoned building, in an overnight shelter, or couch-surfing.) Yes   Are you worried about losing your housing? No  "  Lack of transportation? No   Unable to get utilities (heat,electricity)? No            7/1/2024   Dental   Dentist two times every year? Yes            7/1/2024   TB Screening   Were you born outside of the [place]? No              Today's PHQ-2 Score:       3/13/2024     8:59 AM   PHQ-2 ( 1999 [employer])   Q1: Little interest or pleasure in doing things 0   Q2: Feeling down, depressed or hopeless 0   PHQ-2 Score 0         7/1/2024   Substance Use   Alcohol more than 3/day or more than 7/wk No   Do you use any other substances recreationally? (!) ALCOHOL        Social History     Tobacco Use    Smoking status: Never    Smokeless tobacco: Never   Vaping Use    Vaping status: Never Used   Substance Use Topics    Alcohol use: Yes     Comment: occasional, 2-3 drinks per month    Drug use: No                  7/1/2024   STI Screening   New sexual partner(s) since last STI/HIV test? No        History of abnormal Pap smear: No - age 30- 64 PAP with HPV every 5 years recommended        Latest Ref Rng & Units 2/4/2022     2:00 PM 4/7/2017     3:26 PM 11/8/2013    12:00 AM   PAP / HPV   PAP  Negative for Intraepithelial Lesion or Malignancy (NILM)      PAP (Historical)   NIL  NIL    HPV 16 DNA Negative Negative      HPV 18 DNA Negative Negative      Other HR HPV Negative Negative              7/1/2024   Contraception/Family Planning   Questions about contraception or family planning No           Reviewed and updated as needed this visit by Provider                             Objective    Exam  BP (!) 140/84 (BP Location: Right arm, Patient Position: Sitting, Cuff Size: Adult Large)   Pulse 96   Temp 97.8  F (36.6  C) (Tympanic)   Resp 14   Ht 1.6 m (5' 3\")   Wt 106.8 kg (235 lb 6.4 oz)   LMP 06/14/2024 (Approximate)   SpO2 98%   Breastfeeding No   BMI 41.70 kg/m     Estimated body mass index is 41.7 kg/m  as calculated from the following:    Height as of this encounter: 1.6 m (5' 3\").    Weight as of this encounter: 106.8 " kg (235 lb 6.4 oz).    Physical Exam  GENERAL: alert and no distress  EYES: Eyes grossly normal to inspection, and conjunctivae and sclerae normal  HENT: ear canals and TM's normal, nose and mouth without ulcers or lesions  NECK: no adenopathy, no asymmetry, masses, or scars  RESP: lungs clear to auscultation - no rales, rhonchi or wheezes  CV: regular rate and rhythm, normal S1 S2, no S3 or S4, no murmur, click or rub, no peripheral edema  ABDOMEN: soft, nontender, no hepatosplenomegaly, no masses  MS: no gross musculoskeletal defects noted, no edema  SKIN: no suspicious lesions or rashes  NEURO: Normal strength and tone, mentation intact and speech normal  PSYCH: mentation appears normal, affect normal/bright    BP Readings from Last 6 Encounters:   07/02/24 (!) 140/84   03/13/24 (!) 144/94   02/23/24 (!) 142/100   07/24/23 132/80   05/02/23 136/84   12/25/22 (!) 150/106           Signed Electronically by: Deirdre E. Milligan, MD

## 2024-07-02 NOTE — PATIENT INSTRUCTIONS
"Adult ADHD Diagnostic Options    - Dr. [email or web address]  [phone]     - [facility] - with locations throughout the [facility] area: [phone].     - [facility] - with locations throughout the [facility] area: [phone].     - [facility] - with locations throughout the [facility] area: [phone].        Consider buying the Omron series 3 home automatic blood pressure cuff. Available on Ludesi, at pharmacies.  I recommend checking your blood pressure 2-3 times per week at around the same time every day.  Keep a log in a notebook or in a note in your cell phone.        Patient Education   Preventive Care Advice   This is general advice we often give to help people stay healthy. Your care team may have specific advice just for you. Please talk to your care team about your own preventive care needs.  Lifestyle  Exercise at least 150 minutes each week (30 minutes a day, 5 days a week).  Do muscle strengthening activities 2 days a week. These help control your weight and prevent disease.  No smoking.  Wear sunscreen to prevent skin cancer.  Have your home tested for radon every 2 to 5 years. Radon is a colorless, odorless gas that can harm your lungs. To learn more, go to www.health.[facility].mn.us and search for \"Radon in Homes.\"  Keep guns unloaded and locked up in a safe place like a safe or gun vault, or, use a gun lock and hide the keys. Always lock away bullets separately. To learn more, visit [place].gov and search for \"safe gun storage.\"  Nutrition  Eat 5 or more servings of fruits and vegetables each day.  Try wheat bread, brown rice and whole grain pasta (instead of white bread, rice, and pasta).  Get enough calcium and vitamin D. Check the label on foods and aim for 100% of the RDA (recommended daily allowance).  Regular exams  Have a dental exam and cleaning every 6 months.  See your health care team every year to talk " about:  Any changes in your health.  Any medicines your care team has prescribed.  Preventive care, family planning, and ways to prevent chronic diseases.  Shots (vaccines)   HPV shots (up to age 26), if you've never had them before.  Hepatitis B shots (up to age 59), if you've never had them before.  COVID-19 shot: Get this shot when it's due.  Flu shot: Get a flu shot every year.  Tetanus shot: Get a tetanus shot every 10 years.  Pneumococcal, hepatitis A, and RSV shots: Ask your care team if you need these based on your risk.  Shingles shot (for age 50 and up).  General health tests  Diabetes screening:  Starting at age 35, Get screened for diabetes at least every 3 years.  If you are younger than age 35, ask your care team if you should be screened for diabetes.  Cholesterol test: At age 39, start having a cholesterol test every 5 years, or more often if advised.  Bone density scan (DEXA): At age 50, ask your care team if you should have this scan for osteoporosis (brittle bones).  Hepatitis C: Get tested at least once in your life.  Abdominal aortic aneurysm screening: Talk to your doctor about having this screening if you:  Have ever smoked; and  Are biologically male; and  Are between the ages of 65 and 75.  STIs (sexually transmitted infections)  Before age 24: Ask your care team if you should be screened for STIs.  After age 24: Get screened for STIs if you're at risk. You are at risk for STIs (including HIV) if:  You are sexually active with more than one person.  You don't use condoms every time.  You or a partner was diagnosed with a sexually transmitted infection.  If you are at risk for HIV, ask about PrEP medicine to prevent HIV.  Get tested for HIV at least once in your life, whether you are at risk for HIV or not.  Cancer screening tests  Cervical cancer screening: If you have a cervix, begin getting regular cervical cancer screening tests at age 21. Most people who have regular screenings with  normal results can stop after age 65. Talk about this with your provider.  Breast cancer scan (mammogram): If you've ever had breasts, begin having regular mammograms starting at age 40. This is a scan to check for breast cancer.  Colon cancer screening: It is important to start screening for colon cancer at age 45.  Have a colonoscopy test every 10 years (or more often if you're at risk) Or, ask your provider about stool tests like a FIT test every year or Cologuard test every 3 years.  To learn more about your testing options, visit: www.Nifti/104978.pdf.  For help making a decision, visit: huyen/ww42384.  Prostate cancer screening test: If you have a prostate and are age 55 to 69, ask your provider if you would benefit from a yearly prostate cancer screening test.  Lung cancer screening: If you are a current or former smoker age 50 to 80, ask your care team if ongoing lung cancer screenings are right for you.  For informational purposes only. Not to replace the advice of your health care provider. Copyright   2023 [facility] [employer]. All rights reserved. Clinically reviewed by the [facility] Transitions Program. Food[employer] [id number] - REV 04/24.  Learning About Stress  What is stress?     Stress is your body's response to a hard situation. Your body can have a physical, emotional, or mental response. Stress is a fact of life for most people, and it affects everyone differently. What causes stress for you may not be stressful for someone else.  A lot of things can cause stress. You may feel stress when you go on a job interview, take a test, or run a race. This kind of short-term stress is normal and even useful. It can help you if you need to work hard or react quickly. For example, stress can help you finish an important job on time.  Long-term stress is caused by ongoing stressful situations or events. Examples of long-term stress include long-term health problems, ongoing problems at work, or  conflicts in your family. Long-term stress can harm your health.  How does stress affect your health?  When you are stressed, your body responds as though you are in danger. It makes hormones that speed up your heart, make you breathe faster, and give you a burst of energy. This is called the fight-or-flight stress response. If the stress is over quickly, your body goes back to normal and no harm is done.  But if stress happens too often or lasts too long, it can have bad effects. Long-term stress can make you more likely to get sick, and it can make symptoms of some diseases worse. If you tense up when you are stressed, you may develop neck, shoulder, or low back pain. Stress is linked to high blood pressure and heart disease.  Stress also harms your emotional health. It can make you sultana, tense, or depressed. Your relationships may suffer, and you may not do well at work or school.  What can you do to manage stress?  You can try these things to help manage stress:   Do something active. Exercise or activity can help reduce stress. Walking is a great way to get started. Even everyday activities such as housecleaning or yard work can help.  Try yoga or rashawn chi. These techniques combine exercise and meditation. You may need some training at first to learn them.  Do something you enjoy. For example, listen to music or go to a movie. Practice your hobby or do volunteer work.  Meditate. This can help you relax, because you are not worrying about what happened before or what may happen in the future.  Do guided imagery. Imagine yourself in any setting that helps you feel calm. You can use online videos, books, or a teacher to guide you.  Do breathing exercises. For example:  From a standing position, bend forward from the waist with your knees slightly bent. Let your arms dangle close to the floor.  Breathe in slowly and deeply as you return to a standing position. Roll up slowly and lift your head last.  Hold your  "breath for just a few seconds in the standing position.  Breathe out slowly and bend forward from the waist.  Let your feelings out. Talk, laugh, cry, and express anger when you need to. Talking with supportive friends or family, a counselor, or a garrett leader about your feelings is a healthy way to relieve stress. Avoid discussing your feelings with people who make you feel worse.  Write. It may help to write about things that are bothering you. This helps you find out how much stress you feel and what is causing it. When you know this, you can find better ways to cope.  What can you do to prevent stress?  You might try some of these things to help prevent stress:  Manage your time. This helps you find time to do the things you want and need to do.  Get enough sleep. Your body recovers from the stresses of the day while you are sleeping.  Get support. Your family, friends, and community can make a difference in how you experience stress.  Limit your news feed. Avoid or limit time on social media or news that may make you feel stressed.  Do something active. Exercise or activity can help reduce stress. Walking is a great way to get started.  Where can you learn more?  Go to https://www.Zeetl.net/patiented  Enter N032 in the search box to learn more about \"Learning About Stress.\"  Current as of: October 24, 2023               Content Version: 14.0    [id number] [employer].   Care instructions adapted under license by your healthcare professional. If you have questions about a medical condition or this instruction, always ask your healthcare professional. [employer] disclaims any warranty or liability for your use of this information.      Substance Use Disorder: Care Instructions  Overview     You can improve your life and health by stopping your use of alcohol or drugs. When you don't drink or use drugs, you may feel and sleep better. You may get along better with your family, " friends, and coworkers. There are medicines and programs that can help with substance use disorder.  How can you care for yourself at home?  Here are some ways to help you stay sober and prevent relapse.  If you have been given medicine to help keep you sober or reduce your cravings, be sure to take it exactly as prescribed.  Talk to your doctor about programs that can help you stop using drugs or drinking alcohol.  Do not keep alcohol or drugs in your home.  Plan ahead. Think about what you'll say if other people ask you to drink or use drugs. Try not to spend time with people who drink or use drugs.  Use the time and money spent on drinking or drugs to do something that's important to you.  Preventing a relapse  Have a plan to deal with relapse. Learn to recognize changes in your thinking that lead you to drink or use drugs. Get help before you start to drink or use drugs again.  Try to stay away from situations, friends, or places that may lead you to drink or use drugs.  If you feel the need to drink alcohol or use drugs again, seek help right away. Call a trusted friend or family member. Some people get support from organizations such as Narcotics Anonymous or [employer] or from treatment facilities.  If you relapse, get help as soon as you can. Some people make a plan with another person that outlines what they want that person to do for them if they relapse. The plan usually includes how to handle the relapse and who to notify in case of relapse.  Don't give up. Remember that a relapse doesn't mean that you have failed. Use the experience to learn the triggers that lead you to drink or use drugs. Then quit again. Recovery is a lifelong process. Many people have several relapses before they are able to quit for good.  Follow-up care is a key part of your treatment and safety. Be sure to make and go to all appointments, and call your doctor if you are having problems. It's also a good idea to know your test  "results and keep a list of the medicines you take.  When should you call for help?   Call 911  anytime you think you may need emergency care. For example, call if you or someone else:    Has overdosed or has withdrawal signs. Be sure to tell the emergency workers that you are or someone else is using or trying to quit using drugs. Overdose or withdrawal signs may include:  Losing consciousness.  Seizure.  Seeing or hearing things that aren't there (hallucinations).     Is thinking or talking about suicide or harming others.   Where to get help 24 hours a day, 7 days a week   If you or someone you know talks about suicide, self-harm, a mental health crisis, a substance use crisis, or any other kind of emotional distress, get help right away. You can:    Call the Suicide and Crisis Lifeline at 988.     Call 6-695-827-TALK (1-946.221.4909).     Text HOME to 095795 to access the Crisis Text Line.   Consider saving these numbers in your phone.  Go to [employer]ark for more information or to chat online.  Call your doctor now or seek immediate medical care if:    You are having withdrawal symptoms. These may include nausea or vomiting, sweating, shakiness, and anxiety.   Watch closely for changes in your health, and be sure to contact your doctor if:    You have a relapse.     You need more help or support to stop.   Where can you learn more?  Go to https://www.Trendyol.net/patiented  Enter H573 in the search box to learn more about \"Substance Use Disorder: Care Instructions.\"  Current as of: November 15, 2023               Content Version: 14.0    [id number] [employer].   Care instructions adapted under license by your healthcare professional. If you have questions about a medical condition or this instruction, always ask your healthcare professional. [employer] disclaims any warranty or liability for your use of this information.         "

## 2024-07-03 ENCOUNTER — LAB (OUTPATIENT)
Dept: LAB | Facility: CLINIC | Age: 34
End: 2024-07-03
Payer: COMMERCIAL

## 2024-07-03 DIAGNOSIS — Z00.00 ROUTINE GENERAL MEDICAL EXAMINATION AT A HEALTH CARE FACILITY: ICD-10-CM

## 2024-07-03 LAB
ALBUMIN SERPL BCG-MCNC: 4.1 G/DL (ref 3.5–5.2)
ALP SERPL-CCNC: 66 U/L (ref 40–150)
ALT SERPL W P-5'-P-CCNC: 64 U/L (ref 0–50)
ANION GAP SERPL CALCULATED.3IONS-SCNC: 7 MMOL/L (ref 7–15)
AST SERPL W P-5'-P-CCNC: 40 U/L (ref 0–45)
BILIRUB SERPL-MCNC: 0.2 MG/DL
BUN SERPL-MCNC: 12.2 MG/DL (ref 6–20)
CALCIUM SERPL-MCNC: 9.1 MG/DL (ref 8.6–10)
CHLORIDE SERPL-SCNC: 105 MMOL/L (ref 98–107)
CREAT SERPL-MCNC: 0.7 MG/DL (ref 0.51–0.95)
DEPRECATED HCO3 PLAS-SCNC: 26 MMOL/L (ref 22–29)
EGFRCR SERPLBLD CKD-EPI 2021: >90 ML/MIN/1.73M2
GLUCOSE SERPL-MCNC: 100 MG/DL (ref 70–99)
POTASSIUM SERPL-SCNC: 4.3 MMOL/L (ref 3.4–5.3)
PROT SERPL-MCNC: 6.8 G/DL (ref 6.4–8.3)
SODIUM SERPL-SCNC: 138 MMOL/L (ref 135–145)

## 2024-07-03 PROCEDURE — 36415 COLL VENOUS BLD VENIPUNCTURE: CPT

## 2024-07-03 PROCEDURE — 80053 COMPREHEN METABOLIC PANEL: CPT

## 2024-07-05 DIAGNOSIS — R74.01 ELEVATED ALT MEASUREMENT: Primary | ICD-10-CM

## 2024-07-13 ENCOUNTER — ANCILLARY PROCEDURE (OUTPATIENT)
Dept: GENERAL RADIOLOGY | Facility: CLINIC | Age: 34
End: 2024-07-13
Attending: PHYSICIAN ASSISTANT
Payer: COMMERCIAL

## 2024-07-13 ENCOUNTER — OFFICE VISIT (OUTPATIENT)
Dept: URGENT CARE | Facility: URGENT CARE | Age: 34
End: 2024-07-13
Payer: COMMERCIAL

## 2024-07-13 VITALS
OXYGEN SATURATION: 97 % | SYSTOLIC BLOOD PRESSURE: 140 MMHG | DIASTOLIC BLOOD PRESSURE: 98 MMHG | HEART RATE: 94 BPM | RESPIRATION RATE: 17 BRPM | BODY MASS INDEX: 41.7 KG/M2 | WEIGHT: 235.4 LBS | TEMPERATURE: 98.2 F

## 2024-07-13 DIAGNOSIS — S99.911A ANKLE INJURY, RIGHT, INITIAL ENCOUNTER: Primary | ICD-10-CM

## 2024-07-13 DIAGNOSIS — S99.911A ANKLE INJURY, RIGHT, INITIAL ENCOUNTER: ICD-10-CM

## 2024-07-13 PROCEDURE — 73610 X-RAY EXAM OF ANKLE: CPT | Mod: TC | Performed by: RADIOLOGY

## 2024-07-13 PROCEDURE — 99213 OFFICE O/P EST LOW 20 MIN: CPT | Performed by: PHYSICIAN ASSISTANT

## 2024-07-13 ASSESSMENT — ENCOUNTER SYMPTOMS
JOINT SWELLING: 1
COLOR CHANGE: 0
NUMBNESS: 0
WOUND: 0

## 2024-07-13 NOTE — PROGRESS NOTES
Assessment & Plan:        ICD-10-CM    1. Ankle injury, right, initial encounter  S99.911A XR Ankle Right G/E 3 Views     Ankle/Foot Bracing Supplies Order Walking Boot; Right; Non-pneumatic; Short            Plan/Clinical Decision Making:    Patient with acute ankle injury last night with swelling, pain over lateral malleolus.   I independently visualized the xray:  no fracture seen.   Discussed rest, ibuprofen, Tylenol, elevated. Ice.   Boot fitted and wear until pain improving. Can transition in to OTC ankle brace as needed.       Return if symptoms worsen or fail to improve, for in 5-7 days.     At the end of the encounter, I discussed results, diagnosis, medications. Discussed red flags for immediate return to clinic/ER, as well as indications for follow up if no improvement. Patient understood and agreed to plan. Patient was stable for discharge.        Angelica Arita PA-C on 7/13/2024 at 9:30 AM          Subjective:     HPI:    Liyah is a 34 year old female who presents to clinic today for the following health issues:  Chief Complaint   Patient presents with    Trauma     33 yo F presents with the following  rolled right ankle  swollen hard to bear weight Pt mistepped going down the stairs last night tx- ice  , ibuprofen      HPI    Patient walking down strep and rolled right ankle. Swelling, pain. Hard to bear weight. More so today than yesterday.   Injury last night. Treated with ice and ibuprofen.     Review of Systems   Musculoskeletal:  Positive for joint swelling.   Skin:  Negative for color change and wound.   Neurological:  Negative for numbness.         Patient Active Problem List   Diagnosis    CARDIOVASCULAR SCREENING; LDL GOAL LESS THAN 160    Chronic hypertension affecting pregnancy        Past Medical History:   Diagnosis Date    Depressive disorder 2020    Hypertension 2012    side effect of estrogen birth control, and during pregnancy    IUD (intrauterine device) in place 07/24/2023     Mirena    NO ACTIVE PROBLEMS        Social History     Tobacco Use    Smoking status: Never    Smokeless tobacco: Never   Substance Use Topics    Alcohol use: Yes     Comment: occasional, 2-3 drinks per month             Objective:     Vitals:    07/13/24 0925   BP: (!) 140/98   Pulse: 94   Resp: 17   Temp: 98.2  F (36.8  C)   SpO2: 97%   Weight: 106.8 kg (235 lb 6.4 oz)         Physical Exam   EXAM:   Pleasant, alert, appropriate appearance. NAD.  Head Exam: Normocephalic, atraumatic.  Right ankle with swelling over lateral malleolus. Nl pedal pulse. Tenderness over lateral malleolus, mild left malleolus, no foot tenderness or swelling.   Neuro: CN II-XII intact grossly intact. Sensation intact.   Skin: no rash or lesion. No lacerations or abrasions seen.       Results:  Results for orders placed or performed in visit on 07/13/24   XR Ankle Right G/E 3 Views     Status: None    Narrative    EXAM: XR ANKLE RIGHT G/E 3 VIEWS  LOCATION: [facility]  DATE: 7/13/2024    INDICATION: Pain after injury  COMPARISON: None.      Impression    IMPRESSION: Soft tissue swelling about the ankle. No evidence for fracture or disruption of ankle mortise. Achilles calcaneal spurring.

## 2024-07-17 ENCOUNTER — LAB (OUTPATIENT)
Dept: LAB | Facility: CLINIC | Age: 34
End: 2024-07-17
Payer: COMMERCIAL

## 2024-07-17 DIAGNOSIS — R74.01 ELEVATED ALT MEASUREMENT: ICD-10-CM

## 2024-07-17 LAB
ALBUMIN SERPL BCG-MCNC: 4.5 G/DL (ref 3.5–5.2)
ALP SERPL-CCNC: 73 U/L (ref 40–150)
ALT SERPL W P-5'-P-CCNC: 78 U/L (ref 0–50)
AST SERPL W P-5'-P-CCNC: 55 U/L (ref 0–45)
BILIRUB DIRECT SERPL-MCNC: <0.2 MG/DL (ref 0–0.3)
BILIRUB SERPL-MCNC: 0.3 MG/DL
PROT SERPL-MCNC: 7.3 G/DL (ref 6.4–8.3)

## 2024-07-17 PROCEDURE — 36415 COLL VENOUS BLD VENIPUNCTURE: CPT

## 2024-07-17 PROCEDURE — 80076 HEPATIC FUNCTION PANEL: CPT

## 2024-07-23 ENCOUNTER — LAB (OUTPATIENT)
Dept: LAB | Facility: CLINIC | Age: 34
End: 2024-07-23
Payer: COMMERCIAL

## 2024-07-23 DIAGNOSIS — R74.01 ELEVATED ALT MEASUREMENT: ICD-10-CM

## 2024-07-23 LAB
ALBUMIN SERPL BCG-MCNC: 4.4 G/DL (ref 3.5–5.2)
ALP SERPL-CCNC: 78 U/L (ref 40–150)
ALT SERPL W P-5'-P-CCNC: 68 U/L (ref 0–50)
AST SERPL W P-5'-P-CCNC: 35 U/L (ref 0–45)
BILIRUB DIRECT SERPL-MCNC: <0.2 MG/DL (ref 0–0.3)
BILIRUB SERPL-MCNC: 0.3 MG/DL
FERRITIN SERPL-MCNC: 103 NG/ML (ref 6–175)
HAV IGM SERPL QL IA: NONREACTIVE
HBV CORE AB SERPL QL IA: NONREACTIVE
HBV SURFACE AB SERPL IA-ACNC: <3.5 M[IU]/ML
HBV SURFACE AB SERPL IA-ACNC: NONREACTIVE M[IU]/ML
HBV SURFACE AG SERPL QL IA: NONREACTIVE
HCV AB SERPL QL IA: NONREACTIVE
PROT SERPL-MCNC: 7.1 G/DL (ref 6.4–8.3)
TRANSFERRIN SERPL-MCNC: 257 MG/DL (ref 200–360)

## 2024-07-23 PROCEDURE — 82728 ASSAY OF FERRITIN: CPT

## 2024-07-23 PROCEDURE — 84466 ASSAY OF TRANSFERRIN: CPT

## 2024-07-23 PROCEDURE — 86704 HEP B CORE ANTIBODY TOTAL: CPT

## 2024-07-23 PROCEDURE — 86706 HEP B SURFACE ANTIBODY: CPT

## 2024-07-23 PROCEDURE — 87340 HEPATITIS B SURFACE AG IA: CPT

## 2024-07-23 PROCEDURE — 86803 HEPATITIS C AB TEST: CPT

## 2024-07-23 PROCEDURE — 36415 COLL VENOUS BLD VENIPUNCTURE: CPT

## 2024-07-23 PROCEDURE — 80076 HEPATIC FUNCTION PANEL: CPT

## 2024-07-23 PROCEDURE — 86709 HEPATITIS A IGM ANTIBODY: CPT

## 2024-07-24 ENCOUNTER — OFFICE VISIT (OUTPATIENT)
Dept: SLEEP MEDICINE | Facility: CLINIC | Age: 34
End: 2024-07-24
Payer: COMMERCIAL

## 2024-07-24 VITALS
BODY MASS INDEX: 41.02 KG/M2 | OXYGEN SATURATION: 98 % | SYSTOLIC BLOOD PRESSURE: 138 MMHG | HEART RATE: 82 BPM | DIASTOLIC BLOOD PRESSURE: 95 MMHG | HEIGHT: 63 IN | WEIGHT: 231.5 LBS

## 2024-07-24 DIAGNOSIS — G47.33 OBSTRUCTIVE SLEEP APNEA: Primary | ICD-10-CM

## 2024-07-24 DIAGNOSIS — R53.83 OTHER FATIGUE: ICD-10-CM

## 2024-07-24 DIAGNOSIS — R06.83 SNORING: ICD-10-CM

## 2024-07-24 PROCEDURE — 99203 OFFICE O/P NEW LOW 30 MIN: CPT | Performed by: STUDENT IN AN ORGANIZED HEALTH CARE EDUCATION/TRAINING PROGRAM

## 2024-07-24 ASSESSMENT — SLEEP AND FATIGUE QUESTIONNAIRES
HOW LIKELY ARE YOU TO NOD OFF OR FALL ASLEEP WHILE LYING DOWN TO REST IN THE AFTERNOON WHEN CIRCUMSTANCES PERMIT: MODERATE CHANCE OF DOZING
HOW LIKELY ARE YOU TO NOD OFF OR FALL ASLEEP WHEN YOU ARE A PASSENGER IN A CAR FOR AN HOUR WITHOUT A BREAK: WOULD NEVER DOZE
HOW LIKELY ARE YOU TO NOD OFF OR FALL ASLEEP WHILE SITTING AND TALKING TO SOMEONE: WOULD NEVER DOZE
HOW LIKELY ARE YOU TO NOD OFF OR FALL ASLEEP IN A CAR, WHILE STOPPED FOR A FEW MINUTES IN TRAFFIC: WOULD NEVER DOZE
HOW LIKELY ARE YOU TO NOD OFF OR FALL ASLEEP WHILE WATCHING TV: SLIGHT CHANCE OF DOZING
HOW LIKELY ARE YOU TO NOD OFF OR FALL ASLEEP WHILE SITTING INACTIVE IN A PUBLIC PLACE: WOULD NEVER DOZE
HOW LIKELY ARE YOU TO NOD OFF OR FALL ASLEEP WHILE SITTING AND READING: SLIGHT CHANCE OF DOZING
HOW LIKELY ARE YOU TO NOD OFF OR FALL ASLEEP WHILE SITTING QUIETLY AFTER LUNCH WITHOUT ALCOHOL: WOULD NEVER DOZE

## 2024-07-24 NOTE — PROGRESS NOTES
[facility]  Consultation Note    Name: Liyah Rodriguez MRN#: [id number]   Age: 34 year old YOB: 1990     Date of Consultation: 07/24/24  Consultation is requested by: Hunter Martinez  Primary care provider: Deirdre E. Milligan, MD    History of Present Illness:   Liyah Rodriguez is a 34 year old female patient with HTN and PMD/MDD w/ anxiety    She is sent by Hunter Martinez for a sleep consultation regarding Sleep Problem (Snoring, stops breathing, dry mouth)  .    Liyah Rodriguez main reason for visit: Concern for sleep apnea  Patient states problem(s) started: Unsure. Elis known i about for a long time, but only recently realized i was having apneic episodes  Liyah Rodriguez's goals for this visit: Find cause and treatment for sleep apnea    She has not had a previous sleep study.    CPAP: No    Assessment and Plan:     Problem List Items Addressed This Visit       Obstructive sleep apnea - Primary     STOP BANG score is 6/8. Patient likely has sleep apnea based on clinical history of snoring, EDS (ESS 4/24), witnessed apneas, HTN, BMI 31, neck circumference 41, insomnia (KASSIDY 13/28), snore arousals, nocturia, 6 sleep inertia, and morning dry mouth.   Obstructive sleep apnea reviewed. Complications of Untreated Sleep Apnea Reviewed.  HST/ Polysomnography reviewed. Information provided regarding treatment options for QUEENIE.  Recommend HST to assess for sleep disordered breathing due to high pretest mobility for QUEENIE         Relevant Orders    HST-Home Sleep Apnea Test - Noxturnal Returnable     Other Visit Diagnoses       Snoring        Other fatigue                SLEEP-WAKE SCHEDULE:   Work/School Days: Patient goes to school/work: Yes   Usually gets into bed at 10p on non work days. 9 am after shifts  Takes patient about 15min to fall asleep  Has trouble falling asleep 0-1 nights per week  Wakes up in the middle of the  night 1-2 times times.  Wakes up due to Snorting self awake;External stimuli (bed partner, pets, noise, etc);Use the bathroom  She has trouble falling back asleep 0-1 times a week.   It usually takes Usually fast, occasionally takes an hour or more to get back to sleep  Patient is usually up at 7 on days i don't work. 3:30 on work days  Uses alarm: Yes  Sleep Inertia: Yes    Weekends/Non-work Days/All Other Days:  Usually gets into bed at 10 pm   Takes patient about 15-30 min to fall asleep  Patient is usually up at 7  Uses alarm: Yes    Sleep Need  Patient gets  6-8 hr sleep on average   Patient thinks She needs about 8-9 hr sleep    Liyah Rodriguez prefers to sleep in this position(s): Back;Side   Patient states they do the following activities in bed: Use phone, computer, or tablet    Naps  Patient takes a purposeful nap 1, not including shift work sleep patterns times a week and naps are usually About 2 hrs in duration  She feels better after a nap: Yes  She dozes off unintentionally 0 days per week  Patient has had a driving accident or near-miss due to sleepiness/drowsiness: No      SLEEP DISRUPTIONS:  Breathing/Snoring  Patient snores:Yes  Other people complain about Her snoring: Yes  Patient has been told She stops breathing in Her sleep:Yes  She has issues with the following: Morning mouth dryness;Stuffy nose when you wake up    Movement:  Patient gets pain, discomfort, with an urge to move:  Yes  It happens when She is resting:  Yes  It happens more at night:  Yes  Patient has been told Liyah Rodriguez kicks Her legs at night:  No     Behaviors in Sleep:  Liyah Rodriguez has experienced the following behaviors while sleeping: Waking up paralyzed  She has experienced sudden muscle weakness during the day: No    Is there anything else you would like your sleep provider to know:        CAFFEINE AND OTHER SUBSTANCES:  Patient consumes caffeinated beverages per day:   1  Last caffeine use is usually: At least 6 hrs before bed  List of any prescribed or over the counter stimulants that patient takes:    List of any prescribed or over the counter sleep medication patient takes:    List of previous sleep medications that patient has tried: Melatonin  Patient drinks alcohol to help them sleep: No  Patient drinks alcohol near bedtime: No    Family History:  Patient has a family member been diagnosed with a sleep disorder: No            SCALES:  EPWORTH SLEEPINESS SCALE       7/24/2024    10:49 AM    [place] Sleepiness Scale ( IGOR Harris  [id number]<br>ESS - [place]/English - Final version - 21 Nov 07 - [facility] [place].)   Sitting and reading Slight chance of dozing   Watching TV Slight chance of dozing   Sitting, inactive in a public place (e.g. a theatre or a meeting) Would never doze   As a passenger in a car for an hour without a break Would never doze   Lying down to rest in the afternoon when circumstances permit Moderate chance of dozing   Sitting and talking to someone Would never doze   Sitting quietly after a lunch without alcohol Would never doze   In a car, while stopped for a few minutes in traffic Would never doze   [place] Score (MC) 4   [place] Score (Sleep) 4       INSOMNIA SEVERITY INDEX (KASSIDY)        7/24/2024    10:26 AM   Insomnia Severity Index (KASSIDY)   Difficulty falling asleep 1   Difficulty staying asleep 1   Problems waking up too early 1   How SATISFIED/DISSATISFIED are you with your CURRENT sleep pattern? 2   How NOTICEABLE to others do you think your sleep problem is in terms of impairing the quality of your life? 2   How WORRIED/DISTRESSED are you about your current sleep problem? 3   To what extent do you consider your sleep problem to INTERFERE with your daily functioning (e.g. daytime fatigue, mood, ability to function at work/daily chores, concentration, memory, mood, etc.) CURRENTLY? 3   KASSIDY Total Score 13    Guidelines for  "Scoring/Interpretation:  Total score categories:  0-7 = No clinically significant insomnia   8-14 = Subthreshold insomnia   15-21 = Clinical insomnia (moderate severity)  22-28 = Clinical insomnia (severe)  Used via courtesy of www.[employer].va.gov with permission from Benjamin Brooks PhD., [facility]      STOP BANG   QUEENIE High Risk             Total Score: 6    QUEENIE: Snores loudly    QUEENIE: Often tired    QUEENIE: Observed stopped breathing    QUEENIE: Hypertension    QUEENIE: BMI over 35 kg/m2    QUEENIE: Neck Circum >16 in          GAD7      2/27/2023    10:36 AM   LEEANN-7    1. Feeling nervous, anxious, or on edge 1   2. Not being able to stop or control worrying 1   3. Worrying too much about different things 2   4. Trouble relaxing 2   5. Being so restless that it is hard to sit still 0   6. Becoming easily annoyed or irritable 2   7. Feeling afraid, as if something awful might happen 1   LEEANN-7 Total Score 9   If you checked any problems, how difficult have they made it for you to do your work, take care of things at home, or get along with other people? Somewhat difficult         CAGE-AID      12/2/2020    10:48 AM   CAGE-AID Flowsheet   Have you ever felt you should Cut down on your drinking or drug use? 0   Have people Annoyed you by criticizing your drinking or drug use? 0   Have you ever felt bad or Guilty about your drinking or drug use? 0   Have you ever had a drink or used drugs first thing in the morning to steady your nerves or to get rid of a hangover? (Eye opener) 0   CAGE-AID SCORE 0     CAGE-AID reprinted with permission from the [place] Medical Journal, GEN Andino. and SUSHANT Keller, \"Conjoint screening questionnaires for alcohol and drug abuse\" [place] Medical Journal 94: 135-140, 1995.      PATIENT HEALTH QUESTIONNAIRE-9 (PHQ - 9)      2/27/2023    10:39 AM   PHQ-9 ([employer])   1.  Little interest or pleasure in doing things 0   2.  Feeling down, depressed, or hopeless 1   3.  Trouble falling or staying " asleep, or sleeping too much 0   4.  Feeling tired or having little energy 3   5.  Poor appetite or overeating 1   6.  Feeling bad about yourself - or that you are a failure or have let yourself or your family down 1   7.  Trouble concentrating on things, such as reading the newspaper or watching television 0   8.  Moving or speaking so slowly that other people could have noticed. Or the opposite - being so fidgety or restless that you have been moving around a lot more than usual 0   9.  Thoughts that you would be better off dead, or of hurting yourself in some way 0   PHQ-9 Total Score 6   6.  Feeling bad about yourself 1   7.  Trouble concentrating 0   8.  Moving slowly or restless 0   9.  Suicidal or self-harm thoughts 0   1.  Little interest or pleasure in doing things Not at all   2.  Feeling down, depressed, or hopeless Several days   3.  Trouble falling or staying asleep, or sleeping too much Not at all   4.  Feeling tired or having little energy Nearly every day   5.  Poor appetite or overeating Several days   6.  Feeling bad about yourself Several days   7.  Trouble concentrating Not at all   8.  Moving slowly or restless Not at all   9.  Suicidal or self-harm thoughts Not at all   PHQ-9 via [employer] TOTAL SCORE-----> 6 (Mild depression)     Developed by Aj Kyle, Joyce Vilchis, True Glover and colleagues, with an educational elham from [employer]. No permission required to reproduce, translate, display or distribute.      Allergies:    Allergies   Allergen Reactions    Nkda [No Known Drug Allergy]        Medications:    Current Outpatient Medications   Medication Sig Dispense Refill    albuterol (PROAIR HFA/PROVENTIL HFA/VENTOLIN HFA) 108 (90 Base) MCG/ACT inhaler Inhale 2 puffs into the lungs every 6 hours as needed for shortness of breath, wheezing or cough 18 g 0    fluocinolone acetonide (DERMA SMOOTHE/FS BODY) 0.01 % external oil Apply daily as needed for worsening scalp itching  and scale. 118.28 mL 4    ketoconazole (NIZORAL) 2 % external shampoo Apply 2-3 times weekly to the scalp. Leave in place for at least 5 minutes, then rinse out. 120 mL 11    levonorgestrel (MIRENA) 52 MG (20 mcg/day) IUD 1 each by Intrauterine route once      losartan (COZAAR) 25 MG tablet Take 1 tablet (25 mg) by mouth daily 90 tablet 4    sertraline (ZOLOFT) 50 MG tablet Take 1 tablet (50 mg) by mouth daily 90 tablet 4       Problem List:  Patient Active Problem List    Diagnosis Date Noted    Obstructive sleep apnea 07/24/2024     Priority: Medium    Chronic hypertension affecting pregnancy 11/26/2019     Priority: Medium    CARDIOVASCULAR SCREENING; LDL GOAL LESS THAN 160 12/05/2014     Priority: Medium        Past Medical/Surgical History:  Past Medical History:   Diagnosis Date    Depressive disorder 2020    Hypertension 2012    side effect of estrogen birth control, and during pregnancy    IUD (intrauterine device) in place 07/24/2023    Mirena    NO ACTIVE PROBLEMS      Past Surgical History:   Procedure Laterality Date    ACL repair  06/2008    DILATION AND CURETTAGE SUCTION N/A 5/24/2019    Procedure: Suction Dilation and Curettage   (9 Weeks and 5 Days);  Surgeon: Coni Barnett MD;  Location: [facility] OR    ORTHOPEDIC SURGERY Right     knee surgery    Right lateral menisectomy  06/2008    XR HYSTEROSALPINGOGRAM  02/08/2019    normal fill and spill       Social History:  Social History     Socioeconomic History    Marital status:      Spouse name: Not on file    Number of children: Not on file    Years of education: Not on file    Highest education level: Not on file   Occupational History     Employer: [employer]     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[employer]   Tobacco Use    Smoking status: Never    Smokeless tobacco: Never   Vaping Use    Vaping status: Never Used   Substance and Sexual Activity    Alcohol use: Yes     Comment: occasional, 2-3 drinks per month    Drug use: No    Sexual activity: Yes     Partners: Male      Birth control/protection: None   Other Topics Concern    Parent/sibling w/ CABG, MI or angioplasty before 65F 55M? No   Social History Narrative    Not on file     Social Determinants of Health     Financial Resource Strain: Low Risk  (7/1/2024)    Financial Resource Strain     Within the past 12 months, have you or your family members you live with been unable to get utilities (heat, electricity) when it was really needed?: No   Food Insecurity: Low Risk  (7/1/2024)    Food Insecurity     Within the past 12 months, did you worry that your food would run out before you got money to buy more?: No     Within the past 12 months, did the food you bought just not last and you didn t have money to get more?: No   Transportation Needs: Low Risk  (7/1/2024)    Transportation Needs     Within the past 12 months, has lack of transportation kept you from medical appointments, getting your medicines, non-medical meetings or appointments, work, or from getting things that you need?: No   Physical Activity: Insufficiently Active (7/1/2024)    Exercise Vital Sign     Days of Exercise per Week: 2 days     Minutes of Exercise per Session: 60 min   Stress: Stress Concern Present (7/1/2024)    [employer] [place] [employer] - Occupational Stress Questionnaire     Feeling of Stress : Rather much   Social Connections: Unknown (7/1/2024)    Social Connection and Isolation Panel [NHANES]     Frequency of Communication with Friends and Family: Not on file     Frequency of Social Gatherings with Friends and Family: Once a week     Attends Roman [employer] Services: Not on file     Active Member of Clubs or Organizations: Not on file     Attends Club or Organization Meetings: Not on file     Marital Status: Not on file   Interpersonal Safety: Low Risk  (7/2/2024)    Interpersonal Safety     Do you feel physically and emotionally safe where you currently live?: Yes     Within the past 12 months, have you been hit, slapped, kicked or  "otherwise physically hurt by someone?: No     Within the past 12 months, have you been humiliated or emotionally abused in other ways by your partner or ex-partner?: No   Housing Stability: Low Risk  (2024)    Housing Stability     Do you have housing? : Yes     Are you worried about losing your housing?: No       Family History:  Family History   Problem Relation Age of Onset    Hypertension Mother     Cancer Father 50        lymphoma    Aneurysm Father 63        spotty vision, occasional weakness prior- then sudden death, smoker    Hypertension Father     Cerebrovascular Disease Father          of brain aneurysm at age 63    Other Cancer Father         Non-hodgkins lymphoma    Hypertension Maternal Grandmother     High cholesterol Maternal Grandmother     Cancer Maternal Grandfather         lung cancer    Heart Disease Paternal Grandmother         CHF    Melanoma No family hx of     Skin Cancer No family hx of        Review of Systems:   A complete review of systems reviewed by me is negative with the exeption of what has been mentioned in the history of present illness.  In the last TWO WEEKS have you experienced any of the following symptoms?  Fevers: No  Night Sweats: No  Weight Gain: No  Pain at Night: No  Double Vision: No  Changes in Vision: No  Difficulty Breathing through Nose: Yes  Sore Throat in Morning: Yes  Dry Mouth in the Morning: Yes  Shortness of Breath Lying Flat: No  Shortness of Breath With Activity: No  Awakening with Shortness of Breath: No  Increased Cough: No  Heart Racing at Night: No  Swelling in Feet or Legs: No  Diarrhea at Night: No  Heartburn at Night: Yes  Urinating More than Once at Night: No  Losing Control of Urine at Night: No  Joint Pains at Night: Yes  Headaches in Morning: Yes  Weakness in Arms or Legs: No  Depressed Mood: No  Anxiety: No     Physical Exam:   Vitals: BP (!) 138/95   Pulse 82   Ht 1.6 m (5' 2.99\")   Wt 105 kg (231 lb 8 oz)   LMP 2024 " "(Approximate)   SpO2 98%   BMI 41.02 kg/m    BMI= Body mass index is 41.02 kg/m .  Neck Cir (cm): 41 cm  GENERAL: alert and no distress  EYES: Eyes grossly normal to inspection.  No discharge or erythema, or obvious scleral/conjunctival abnormalities.  RESP: No audible wheeze, cough, or visible cyanosis.    SKIN: Visible skin clear. No significant rash, abnormal pigmentation or lesions.  NEURO: Cranial nerves grossly intact.  Mentation and speech appropriate for age.  PSYCH: Appropriate affect, tone, and pace of words         Data: All pertinent previous laboratory data reviewed     Recent Labs   Lab Test 07/03/24  1034 06/02/20  1815 04/16/20  0155 12/16/19  0839     --   --  138   POTASSIUM 4.3  --   --  3.9   CHLORIDE 105  --   --  108   CO2 26  --   --  20   ANIONGAP 7  --   --  10   *  --   --  106*   BUN 12.2  --   --  12   CR 0.70 0.61   < > 0.62   JACKIE 9.1  --   --  8.8    < > = values in this interval not displayed.       Recent Labs   Lab Test 10/26/22  1148   WBC 8.8   RBC 4.57   HGB 13.4   HCT 41.1   MCV 90   MCH 29.3   MCHC 32.6   RDW 12.8          Recent Labs   Lab Test 07/23/24  1128   PROTTOTAL 7.1   ALBUMIN 4.4   BILITOTAL 0.3   ALKPHOS 78   AST 35   ALT 68*       TSH (mU/L)   Date Value   10/26/2022 2.61   04/21/2020 2.58   07/31/2019 2.10       No results found for: \"UAMP\", \"UBARB\", \"BENZODIAZEUR\", \"UCANN\", \"UCOC\", \"OPIT\", \"UPCP\"    Ferritin   Date/Time Value Ref Range Status   07/23/2024 11:28  6 - 175 ng/mL Final       No results found for: \"PH\", \"PHARTERIAL\", \"PO2\", \"DG1JESABIBZ\", \"SAT\", \"PCO2\", \"HCO3\", \"BASEEXCESS\", \"ED\", \"BEB\"    @LABRCNTIPR(phv:4,pco2v:4,po2v:4,hco3v:4,rasheeda:4,o2per:4)@    Echocardiology: No results found for this or any previous visit (from the past 4320 hour(s)).    Chest x-ray:   XR Chest 2 Views 02/23/2024    Narrative  CHEST TWO VIEWS   2/23/2024 12:23 PM    HISTORY: Acute cough. Wheezing. Elevated blood pressure without  diagnosis of " hypertension.    COMPARISON: 12/25/2022.    Impression  IMPRESSION: No acute cardiopulmonary disease.    JOSE LUIS THORNE MD      SYSTEM ID:  [id number]      Chest CT:   No results found for this or any previous visit from the past 365 days.      PFT: Most Recent Breeze Pulmonary Function Testing  No results found    Patient was strongly advised to avoid driving, operating any heavy machinery or other hazardous situations while drowsy or sleepy.  Patient was counseled on the importance of driving while alert, to pull over if drowsy, or nap before getting into the vehicle if sleepy.      Plan is for Liyah Rodriguez to follow up following HST.     The above note was dictated using voice recognition software. Although reviewed after completion, some word and grammatical error may remain . Please contact the author for any clarifications.    Total time spent reviewing medical records, history and physical examination, review of previous testing and interpretation as well as documentation on this date, 07/24/24: 30 minutes    Hunter Martinez MD on 10/20/2022   [facility]   [address], [address]   [address] [place] [address]. [place] [address]   Appointments: [phone]     CC: Hunter Martinez

## 2024-07-24 NOTE — ASSESSMENT & PLAN NOTE
STOP BANG score is 6/8. Patient likely has sleep apnea based on clinical history of snoring, EDS (ESS 4/24), witnessed apneas, HTN, BMI 31, neck circumference 41, insomnia (KASSIDY 13/28), snore arousals, nocturia, 6 sleep inertia, and morning dry mouth.   Obstructive sleep apnea reviewed. Complications of Untreated Sleep Apnea Reviewed.  HST/ Polysomnography reviewed. Information provided regarding treatment options for QUEENIE.  Recommend HST to assess for sleep disordered breathing due to high pretest mobility for QUEENIE

## 2024-07-24 NOTE — PATIENT INSTRUCTIONS
"MY INFORMATION ON SLEEP APNEA-  Liyah Rodriguez    DOCTOR : Hunter Martinez MD  SLEEP CENTER :      MY CONTACT NUMBER:    [facility]   [phone]  [facility]    Am I having a sleep study at a sleep center?  Make sure you have an appointment for the study before you leave!  [email or web address]    Am I having a home sleep study?  Watch the video for the device you are using:  /drop off device, Noxturnal T-3: [email or web address]/watch?v=yGGFBdELGhk  Disposable device sent out require phone/computer application, WatchPAT1: [email or web address]?v=BCce_vbiwxE  The sleep lab will call you for this appointment   If you wish to reschedule the sleep study or contact the sleep lab scheduling call [phone]        Frequently asked questions:  1. What is Obstructive Sleep Apnea (QUEENIE)? QUEENIE is the most common type of sleep apnea. Apnea means, \"without breath.\"  Apnea is most often caused by narrowing or collapse of the upper airway as muscles relax during sleep.   Almost everyone has occasional apneas. Most people with sleep apnea have had brief interruptions at night frequently for many years.  The severity of sleep apnea is related to how frequent and severe the events are.   Apneas are any events where there is no breathing.  Hypopneas are any events where there is shallow breathing. Sleep studies will track and then add all of the apneas and hypopneas into a total and then divided this number by the total time asleep to obtain the apnea hypopnea index(AHI). 0-5 events/per hour = No Sleep Apnea; 5-15 events/per hour = Mild Sleep Apnea; 15-30 events/per hour = Moderate Sleep Apnea; Greater than 30 events/per hour = Severe Sleep Apnea.  Sleep apnea is typically worse during REM sleep due to complete muscle relaxation, including the muscles of the airway. Sleep apnea is also " typically worse during supine(sleeping on your back) sleep due to internal structures more easily falling on the top of the airway and external pressures more easily crushing the airway.  The respiratory disturbance index(RDI) includes apneas, hypopneas, and other respiratory events such as snoring that may disturb your sleep.  2. What are the consequences of QUEENIE? Symptoms include: feeling sleepy during the day, snoring loudly, gasping or stopping of breathing, trouble sleeping, and occasionally morning headaches or heartburn at night.  Sleepiness can be serious and even increase the risk of falling asleep while driving. Other health consequences may include development of high blood pressure and other cardiovascular disease in persons who are susceptible. Untreated QUEENIE  can contribute to heart disease, stroke and diabetes.   3. What are the treatment options? In most situations, sleep apnea is a lifelong disease that must be managed with daily therapy. Medications are not effective for sleep apnea and surgery is generally not considered until other therapies have been tried. Your treatment is your choice . Continuous Positive Airway (CPAP) works right away and is the therapy that is effective in nearly everyone. An oral device to hold your jaw forward is usually the next most reliable option. Other options include postioning devices (to keep you off your back), weight loss, and surgery including a tongue pacing device. There is more detail about some of these options below.    Important tips for using CPAP and similar devices   Know your equipment:  CPAP is continuous positive airway pressure that prevents obstructive sleep apnea by keeping the throat from collapsing while you are sleeping. In most cases, the device is  smart  and can slowly self-adjusts if your throat collapses and keeps a record every day of how well you are treated-this information is available to you and your care team.  BPAP is bilevel  positive airway pressure that keeps your throat open and also assists each breath with a pressure boost to maintain adequate breathing.  Special kinds of BPAP are used in patients who have inadequate breathing from lung or heart disease. In most cases, the device is  smart  and can slowly self-adjusts to assist breathing. Like CPAP, the device keeps a record of how well you are treated.  Your mask is your connection to the device. You get to choose what feels most comfortable and the staff will help to make sure if fits. Here: are some examples of the different masks that are available:       Key points to remember on your journey with sleep apnea:  Sleep study.  PAP devices often need to be adjusted during a sleep study to show that they are effective and adjusted right.  Good tips to remember: Try wearing just the mask during a quiet time during the day so your body adapts to wearing it. A humidifier is recommended for comfort in most cases to prevent drying of your nose and throat. Allergy medication from your provider may help you if you are having nasal congestion.  Getting settled-in. It takes more than one night for most of us to get used to wearing a mask. Try wearing just the mask during a quiet time during the day so your body adapts to wearing it. A humidifier is recommended for comfort in most cases. Our team will work with you carefully on the first day and will be in contact within 4 days and again at 2 and 4 weeks for advice and remote device adjustments. Your therapy is evaluated by the device each day.   Use it every night. The more you are able to sleep naturally for 7-8 hours, the more likely you will have good sleep and to prevent health risks or symptoms from sleep apnea. Even if you use it 4 hours it helps. Occasionally all of us are unable to use a medical therapy, in sleep apnea, it is not dangerous to miss one night.   Communicate. Call our skilled team on the number provided on the first  day if your visit for problems that make it difficult to wear the device. Over 2 out of 3 patients can learn to wear the device long-term with help from our team. Remember to call our team or your sleep providers if you are unable to wear the device as we may have other solutions for those who cannot adapt to mask CPAP therapy. It is recommended that you sleep your sleep provider within the first 3 months and yearly after that if you are not having problems.   Take care of your equipment. Make sure you clean your mask and tubing using directions every day and that your filter and mask are replaced as recommended or if they are not working.     BESIDES CPAP, WHAT OTHER THERAPIES ARE THERE?    Positioning Device  Positioning devices are generally used when sleep apnea is mild and only occurs on your back.This example shows a pillow that straps around the waist. It may be appropriate for those whose sleep study shows milder sleep apnea that occurs primarily when lying flat on one's back. Preliminary studies have shown benefit but effectiveness at home may need to be verified by a home sleep test. These devices are generally not covered by medical insurance.    Oral Appliance  What is oral appliance therapy?  An oral appliance device fits on your teeth at night like a retainer used after having braces. The device is made by a specialized dentist and requires several visits over 1-2 months before a manufactured device is made to fit your teeth and is adjusted to prevent your sleep apnea. Once an oral device is working properly, snoring should be improved. A home sleep test may be recommended at that time if to determine whether the sleep apnea is adequately treated.       Some things to remember:  -Oral devices are often, but not always, covered by your medical insurance. Be sure to check with your insurance provider.   -If you are referred for oral therapy, you will be given a list of specialized dentists to consider or  you may choose to visit the Web site of the American Academy of Dental Sleep Medicine  -Oral devices are less likely to work if you have severe sleep apnea or are extremely overweight.     More detailed information  An oral appliance is a small acrylic device that fits over the upper and lower teeth  (similar to a retainer or a mouth guard). This device slightly moves jaw forward, which moves the base of the tongue forward, opens the airway, improves breathing for effective treat snoring and obstructive sleep apnea in perhaps 7 out of 10 people .  The best working devices are custom-made by a dental device  after a mold is made of the teeth 1, 2, 3.  When is an oral appliance indicated?  Oral appliance therapy is recommended as a first-line treatment for patients with primary snoring, mild sleep apnea, and for patients with moderate sleep apnea who prefer appliance therapy to use of CPAP4, 5. Severity of sleep apnea is determined by sleep testing and is based on the number of respiratory events per hour of sleep.   How successful is oral appliance therapy?  The success rate of oral appliance therapy in patients with mild sleep apnea is 75-80% while in patients with moderate sleep apnea it is 50-70%. The chance of success in patients with severe sleep apnea is 40-50%. The research also shows that oral appliances have a beneficial effect on the cardiovascular health of QUEENIE patients at the same magnitude as CPAP therapy7.  Oral appliances should be a second-line treatment in cases of severe sleep apnea, but if not completely successful then a combination therapy utilizing CPAP plus oral appliance therapy may be effective. Oral appliances tend to be effective in a broad range of patients although studies show that the patients who have the highest success are females, younger patients, those with milder disease, and less severe obesity. 3, 6.   Finding a dentist that practices dental sleep medicine  Specific  training is available through the American Academy of Dental Sleep Medicine for dentists interested in working in the field of sleep. To find a dentist who is educated in the field of sleep and the use of oral appliances, near you, visit the Web site of the American Academy of Dental Sleep Medicine.    References  1. Domitila et al. Objectively measured vs self-reported compliance during oral appliance therapy for sleep-disordered breathing. Chest 2013; 144(5): 9262-2017.  2. Emory et al. Objective measurement of compliance during oral appliance therapy for sleep-disordered breathing. Thorax 2013; 68(1): 91-96.  3. Alejandro et al. Mandibular advancement devices in 620 men and women with QUEENIE and snoring: tolerability and predictors of treatment success. Chest 2004; 125: 7907-4318.  4. Isak et al. Oral appliances for snoring and QUEENIE: a review. Sleep 2006; 29: 244-262.  5. Varun et al. Oral appliance treatment for QUEENIE: an update. J Clin Sleep Med 2014; 10(2): 215-227.  6. Danay et al. Predictors of OSAH treatment outcome. J Dent Res 2007; 86: 1115-9158.      Weight Loss:    Weight loss is a long-term strategy that may improve sleep apnea in some patients.    Weight management is a personal decision.  If you are interested in exploring weight loss strategies, the following discussion covers the impact on weight loss on sleep apnea and the approaches that may be successful.    Weight loss decreases severity of sleep apnea in most people with obesity. For those with mild obesity who have developed snoring with weight gain, even 15-30 pound weight loss can improve and occasionally eliminate sleep apnea.  Structured and life-long dietary and health habits are necessary to lose weight and keep healthier weight levels.     Though there may be significant health benefits from weight loss, long-term weight loss is very difficult to achieve- studies show success with dietary management in less than 10%  of people. In addition, substantial weight loss may require years of dietary control and may be difficult if patients have severe obesity. In these cases, surgical management may be considered.  Finally, older individuals who have tolerated obesity without health complications may be less likely to benefit from weight loss strategies.    Your BMI is Body mass index is 41.02 kg/m .    Weight management plan: Discussed healthy diet and exercise guidelines    Surgery:    Surgery for obstructive sleep apnea is considered generally only when other therapies fail to work. Surgery may be discussed with you if you are having a difficult time tolerating CPAP and or when there is an abnormal structure that requires surgical correction.  Nose and throat surgeries often enlarge the airway to prevent collapse.  Most of these surgeries create pain for 1-2 weeks and up to half of the most common surgeries are not effective throughout life.  You should carefully discuss the benefits and drawbacks to surgery with your sleep provider and surgeon to determine if it is the best solution for you.   More information  Surgery for QUEENIE is directed at areas that are responsible for narrowing or complete obstruction of the airway during sleep.  There are a wide range of procedures available to enlarge and/or stabilize the airway to prevent blockage of breathing in the three major areas where it can occur: the palate, tongue, and nasal regions.  Successful surgical treatment depends on the accurate identification of the factors responsible for obstructive sleep apnea in each person.  A personalized approach is required because there is no single treatment that works well for everyone.  Because of anatomic variation, consultation with an examination by a sleep surgeon is a critical first step in determining what surgical options are best for each patient.  In some cases, examination during sedation may be recommended in order to guide the  selection of procedures.  Patients will be counseled about risks and benefits as well as the typical recovery course after surgery. Surgery is typically not a cure for a person s QUEENIE.  However, surgery will often significantly improve one s QUEENIE severity (termed  success rate ).  Even in the absence of a cure, surgery will decrease the cardiovascular risk associated with OSA7; improve overall quality of life8 (sleepiness, functionality, sleep quality, etc).      Palate Procedures:  Patients with QUEENIE often have narrowing of their airway in the region of their tonsils and uvula.  The goals of palate procedures are to widen the airway in this region as well as to help the tissues resist collapse.  Modern palate procedure techniques focus on tissue conservation and soft tissue rearrangement, rather than tissue removal.  Often the uvula is preserved in this procedure. Residual sleep apnea is common in patient after pharyngoplasty with an average reduction in sleep apnea events of 33%2.      Tongue Procedures:  ExamWhile patients are awake, the muscles that surround the throat are active and keep this region open for breathing. These muscles relax during sleep, allowing the tongue and other structures to collapse and block breathing.  There are several different tongue procedures available.  Selection of a tongue base procedure depends on characteristics seen on physical exam.  Generally, procedures are aimed at removing bulky tissues in this area or preventing the back of the tongue from falling back during sleep.  Success rates for tongue surgery range from 50-62%3.    Hypoglossal Nerve Stimulation:  Hypoglossal nerve stimulation has recently received approval from the [place] Food and Drug Administration for the treatment of obstructive sleep apnea.  This is based on research showing that the system was safe and effective in treating sleep apnea6.  Results showed that the median AHI score decreased 68%, from 29.3  to 9.0. This therapy uses an implant system that senses breathing patterns and delivers mild stimulation to airway muscles, which keeps the airway open during sleep.  The system consists of three fully implanted components: a small generator (similar in size to a pacemaker), a breathing sensor, and a stimulation lead.  Using a small handheld remote, a patient turns the therapy on before bed and off upon awakening.    Candidates for this device must be greater than 22 years of age, have moderate to severe QUEENIE (AHI between 20-65), BMI less than 32, have tried CPAP/oral appliance without success, and have appropriate upper airway anatomy (determined by a sleep endoscopy performed by Dr. Duffy).    Hypoglossal Nerve Stimulation Pathway:    The sleep surgeon s office will work with the patient through the insurance prior-authorization process (including communications and appeals).    Nasal Procedures:  Nasal obstruction can interfere with nasal breathing during the day and night.  Studies have shown that relief of nasal obstruction can improve the ability of some patients to tolerate positive airway pressure therapy for obstructive sleep apnea1.  Treatment options include medications such as nasal saline, topical corticosteroid and antihistamine sprays, and oral medications such as antihistamines or decongestants. Non-surgical treatments can include external nasal dilators for selected patients. If these are not successful by themselves, surgery can improve the nasal airway either alone or in combination with these other options.      Combination Procedures:  Combination of surgical procedures and other treatments may be recommended, particularly if patients have more than one area of narrowing or persistent positional disease.  The success rate of combination surgery ranges from 66-80%2,3.    References  Jesse RILEY. The Role of the Nose in Snoring and Obstructive Sleep Apnoea: An Update.  Eur Arch Otorhinolaryngol.  2011; 268: 1365-73.   Jeannie SM; Joel JA; Catherine JR; Pallanch JF; Steven MB; Marky SG; Amy WREND. Surgical modifications of the upper airway for obstructive sleep apnea in adults: a systematic review and meta-analysis. SLEEP 2010;33(10):7762-2879. Chiquita WHITFIELD. Hypopharyngeal surgery in obstructive sleep apnea: an evidence-based medicine review.  Arch Otolaryngol Head Neck Surg. 2006 Feb;132(2):206-13.  Jose M YH1, Patrica Y, Juan Carlos BILL. The efficacy of anatomically based multilevel surgery for obstructive sleep apnea. Otolaryngol Head Neck Surg. 2003 Oct;129(4):327-35.  Chiquita WHITFIELD, Goldberg A. Hypopharyngeal Surgery in Obstructive Sleep Apnea: An Evidence-Based Medicine Review. Arch Otolaryngol Head Neck Surg. 2006 Feb;132(2):206-13.  Raúl PJ et al. Upper-Airway Stimulation for Obstructive Sleep Apnea.  N Engl J Med. 2014 Jan 9;370(2):139-49.  Azael Y et al. Increased Incidence of Cardiovascular Disease in Middle-aged Men with Obstructive Sleep Apnea. Am J Respir Crit Care Med; 2002 166: 159-165  Mendez EM et al. Studying Life Effects and Effectiveness of Palatopharyngoplasty (SLEEP) study: Subjective Outcomes of Isolated Uvulopalatopharyngoplasty. Otolaryngol Head Neck Surg. 2011; 144: 623-631.

## 2024-07-25 ENCOUNTER — HOSPITAL ENCOUNTER (OUTPATIENT)
Dept: ULTRASOUND IMAGING | Facility: CLINIC | Age: 34
Discharge: HOME | End: 2024-07-25
Attending: STUDENT IN AN ORGANIZED HEALTH CARE EDUCATION/TRAINING PROGRAM | Admitting: STUDENT IN AN ORGANIZED HEALTH CARE EDUCATION/TRAINING PROGRAM
Payer: COMMERCIAL

## 2024-07-25 DIAGNOSIS — R74.01 ELEVATED ALT MEASUREMENT: ICD-10-CM

## 2024-07-25 PROCEDURE — 76705 ECHO EXAM OF ABDOMEN: CPT | Mod: 26 | Performed by: RADIOLOGY

## 2024-07-25 PROCEDURE — 76705 ECHO EXAM OF ABDOMEN: CPT

## 2024-08-13 ENCOUNTER — TELEPHONE (OUTPATIENT)
Dept: PEDIATRICS | Facility: CLINIC | Age: 34
End: 2024-08-13

## 2024-08-13 ENCOUNTER — VIRTUAL VISIT (OUTPATIENT)
Dept: PEDIATRICS | Facility: CLINIC | Age: 34
End: 2024-08-13
Payer: COMMERCIAL

## 2024-08-13 DIAGNOSIS — E66.01 MORBID OBESITY (H): Primary | ICD-10-CM

## 2024-08-13 DIAGNOSIS — K76.0 METABOLIC DYSFUNCTION-ASSOCIATED STEATOTIC LIVER DISEASE (MASLD): ICD-10-CM

## 2024-08-13 DIAGNOSIS — E66.01 MORBID OBESITY (H): ICD-10-CM

## 2024-08-13 PROCEDURE — 99442 PR PHYSICIAN TELEPHONE EVALUATION 11-20 MIN: CPT | Performed by: STUDENT IN AN ORGANIZED HEALTH CARE EDUCATION/TRAINING PROGRAM

## 2024-08-13 NOTE — PATIENT INSTRUCTIONS
[email or web address]    Consider vit E 800 IU daily  -2 [employer] pills      Prefilled Syringes Pricing #4 each (1month):  0.25mg ~$222  0.5mg ~$260  1mg ~$306  1.5mg ~$342  2mg ~$395  2.5mg ~$438

## 2024-08-13 NOTE — PROGRESS NOTES
Liyah is a 34 year old who is being evaluated via a billable telephone visit.      Originating Location (pt. Location): Home    Distant Location (provider location):  On-site    Assessment & Plan     Morbid obesity (H)  Metabolic dysfunction-associated steatotic liver disease (MASLD)  Discussed diagnosis, ultrasound findings, and lifestyle recommendations for MASLD management including weight loss 3-5% of body weight, vitamin E, repeat labs in about 6 months. Will start GLP1a (pending insurance coverage) and work with dietician. Recommend visit 3 months after starting medication - if insurance doesn't cover consider compounded medications.  - Semaglutide-Weight Management (WEGOVY) 0.25 MG/0.5ML pen; Inject 0.25 mg subcutaneously once a week  - Semaglutide-Weight Management (WEGOVY) 0.5 MG/0.5ML pen; Inject 0.5 mg subcutaneously once a week  - Semaglutide-Weight Management (WEGOVY) 1 MG/0.5ML pen; Inject 1 mg subcutaneously once a week  - Med Therapy Management Referral  - Hepatic panel (Albumin, ALT, AST, Bili, Alk Phos, TP); Future  - Adult Nutrition  Referral; Future                Subjective   Liyah is a 34 year old, presenting for the following health issues:  No chief complaint on file.    History of Present Illness       Reason for visit:  Nafld/james    She eats 0-1 servings of fruits and vegetables daily.She consumes 0 sweetened beverage(s) daily.She exercises with enough effort to increase her heart rate 30 to 60 minutes per day.  She exercises with enough effort to increase her heart rate 3 or less days per week.   She is taking medications regularly.                   Objective           Vitals:  No vitals were obtained today due to virtual visit.    Physical Exam   General: Alert and no distress //Respiratory: No audible wheeze, cough, or shortness of breath // Psychiatric:  Appropriate affect, tone, and pace of words            Phone call duration: 19 minutes  Signed Electronically by:  Deirdre E. Milligan, MD

## 2024-08-13 NOTE — TELEPHONE ENCOUNTER
"Height 5' 3\", weight 231 lns with BMI 40.9.    Prescription sent to [place] mail order pharmacy.    Deirdre Milligan, MD  Internal Medicine & Pediatrics  [facility] [place]  She/her    "

## 2024-08-13 NOTE — PROGRESS NOTES
"Liyah is a 34 year old who is being evaluated via a billable video visit.    {ROOMING STAFF complete during rooming of virtual visit (Optional):859943}  {If patient encounters technical issues they should call [phone] :630454}    {PROVIDER CHARTING PREFERENCE:041886}    Subjective   Liyah is a 34 year old, presenting for the following health issues:  No chief complaint on file.  {(!) Visit Details have not yet been documented.  Please enter Visit Details and then use this list to pull in documentation. (Optional):064039}  History of Present Illness       Reason for visit:  Nafld/james    She eats 0-1 servings of fruits and vegetables daily.She consumes 0 sweetened beverage(s) daily.She exercises with enough effort to increase her heart rate 30 to 60 minutes per day.  She exercises with enough effort to increase her heart rate 3 or less days per week.   She is taking medications regularly.     Wt Readings from Last 4 Encounters:   07/24/24 105 kg (231 lb 8 oz)   07/13/24 106.8 kg (235 lb 6.4 oz)   07/02/24 106.8 kg (235 lb 6.4 oz)   03/13/24 105.1 kg (231 lb 12.8 oz)       {SUPERLIST (Optional):275494}  {additonal problems for provider to add (Optional):368283}    {ROS Picklists (Optional):635812}      Objective           Vitals:  No vitals were obtained today due to virtual visit.    Physical Exam   {video visit exam brief selected:365668}    {Diagnostic Test Results (Optional):172906}      Video-Visit Details    Type of service:  Video Visit   Originating Location (pt. Location): {video visit patient location:936578::\"Home\"}  {PROVIDER LOCATION On-site should be selected for visits conducted from your clinic location or adjoining [facility] hospital, academic office, or other nearby [facility] building. Off-site should be selected for all other provider locations, including home:970602}  Distant Location (provider location):  {virtual location provider:145928}  Platform used for Video Visit: {Virtual Visit " "Platforms:865450::\"Jose\"}  Signed Electronically by: Deirdre E. Milligan, MD  {Email feedback regarding this note to [email or web address]   :577373}  "

## 2024-08-15 ENCOUNTER — TELEPHONE (OUTPATIENT)
Dept: PEDIATRICS | Facility: CLINIC | Age: 34
End: 2024-08-15
Payer: COMMERCIAL

## 2024-08-15 NOTE — TELEPHONE ENCOUNTER
MTM referral from: [facility] visit (referral by provider)    MTM referral outreach attempt #2 on August 15, 2024 at 11:20 AM      Outcome: Patient not reachable after several attempts, routed to Pharmacist Team/Provider as an FYI    Use HBC for the carrier/Plan on the flowsheet      ApiFix Message Sent    EVE Rushing

## 2024-08-16 NOTE — TELEPHONE ENCOUNTER
MTM Referral outreach attempt #3 was made on 08/16/2024.    Outcome: Sent patient MyChart message explaining more in-depth what MTM is and how we can best support them. Attached ability to schedule from message, as well as offered opportunity to call me directly for help with scheduling.

## 2024-08-26 ENCOUNTER — VIRTUAL VISIT (OUTPATIENT)
Dept: CARDIOLOGY | Facility: CLINIC | Age: 34
End: 2024-08-26
Attending: STUDENT IN AN ORGANIZED HEALTH CARE EDUCATION/TRAINING PROGRAM
Payer: COMMERCIAL

## 2024-08-26 ENCOUNTER — TELEPHONE (OUTPATIENT)
Dept: ENDOCRINOLOGY | Facility: CLINIC | Age: 34
End: 2024-08-26
Payer: COMMERCIAL

## 2024-08-26 DIAGNOSIS — E66.813 CLASS 3 SEVERE OBESITY DUE TO EXCESS CALORIES IN ADULT, UNSPECIFIED BMI, UNSPECIFIED WHETHER SERIOUS COMORBIDITY PRESENT (H): Primary | ICD-10-CM

## 2024-08-26 DIAGNOSIS — E66.01 CLASS 3 SEVERE OBESITY DUE TO EXCESS CALORIES IN ADULT, UNSPECIFIED BMI, UNSPECIFIED WHETHER SERIOUS COMORBIDITY PRESENT (H): Primary | ICD-10-CM

## 2024-08-26 RX ORDER — VITAMIN E 268 MG
2 CAPSULE ORAL DAILY
COMMUNITY

## 2024-08-26 ASSESSMENT — PAIN SCALES - GENERAL: PAINLEVEL: NO PAIN (0)

## 2024-08-26 NOTE — TELEPHONE ENCOUNTER
PA Initiation    Medication: WEGOVY 0.25 MG/0.5ML SC SOAJ  Insurance Company: [employer] - Phone [phone] Fax [phone]  Pharmacy Filling the Rx: [facility] - [place], [place] - [facility] [address]  Filling Pharmacy Phone: [phone]  Filling Pharmacy Fax: [phone]  Start Date: 8/26/2024

## 2024-08-26 NOTE — TELEPHONE ENCOUNTER
"Prior Authorization Retail Medication Request    Medication/Dose: [facility] insurance requirements Wegovy 0.25 mg - 2.4 mg (all doses) as indicated by supply, safety, and efficacy.    Diagnosis and ICD code (if different than what is on RX):    New/renewal/insurance change PA/secondary ins. PA:    Rationale: Liyah Rodriguez is a 34 year old female with a diagnosis of Class III Obesity (BMI at least 40 kg/m2). Patient met with [facility] Medication Therapy Management Pharmacist 08/26/2024 per [facility] insurance requirements. Patient denies personal or family history of MEN Type2, MTC, Pancreatitis.     Patient would benefit from additional pharmacotherapy for weight management. Given class III obesity, recommend GLP1/GIP therapy as data to support most significant weight loss. Patient also likely to benefit from reduction in food noise and increased satiety. Negative GI symptomatology at baseline. Negative history of pancreatitis, medullary thyroid cancer and multiple endocrine neoplasia type 2. Reviewed mechanism, adverse effects, monitoring, safety, administration with use of Wegovy.      For patients that are under [employer] insurance coverage, it is mandated by insurance that each qualifying patient meet with hospital based Weight Management Medication Therapy Management pharmacist to continue therapy coverage. The following patient meets the below coverage criteria and can therefore continue GLP-1/GIP agonist therapy for Weight Management:    Adult  BMI >40 with or without comorbidities   OR   BMI >30 + NAFLD*   at time of initiating GLP-1/GIP agonist therapy Approved for 29 weeks  Met Updated Initial Criteria   At least 5% weight loss of baseline body weight  Approved for 12 months        Estimated body mass index is 41.02 kg/m  as calculated from the following:    Height as of 7/24/24: 1.6 m (5' 2.99\").    Weight as of 7/24/24: 105 kg (231 lb 8 " oz).     Insurance       Pharmacy Information (if different than what is on RX)  [facility]    Phone: [phone]  - Press 2 to leave a refill request on a secured voicemail  - Press 3 to place an automated refill request  - Press 4 to speak directly to a member of the pharmacy staff    Hours: Monday through Friday 8am to 7pm and Saturday 8am to 4pm

## 2024-08-26 NOTE — PROGRESS NOTES
Medication Therapy Management (MTM) Encounter    ASSESSMENT:                            Medication Adherence/Access: No issues identified    Weight management:   Patient would benefit from additional pharmacotherapy for weight management. Given class III obesity, recommend GLP1/GIP therapy as data to support most significant weight loss. Patient also likely to benefit from reduction in food noise and increased satiety. Negative GI symptomatology at baseline. Negative history of pancreatitis, medullary thyroid cancer and multiple endocrine neoplasia type 2. Reviewed mechanism, adverse effects, monitoring, safety, administration with use of Wegovy.      For patients that are under [employer] insurance coverage, it is mandated by insurance that each qualifying patient meet with hospital based Weight Management Medication Therapy Management pharmacist to continue therapy coverage. The following patient meets the below coverage criteria and can therefore continue GLP-1/GIP agonist therapy for Weight Management:    Adult  BMI >40 with or without comorbidities   OR   BMI >30 + NAFLD*   at time of initiating GLP-1/GIP agonist therapy Approved for 29 weeks  Met Updated Initial Criteria   At least 5% weight loss of baseline body weight  Approved for 12 months        Hypertension   Stable. Continue to monitor for need as weight decreases.     Mental Health   Depression  Stable.      Supplements   Stable.     PLAN:                            Pharmacist to start Prior Authorization on Wegovy. Once approved, to start Wegovy 0.25 mg subcutaneous once weekly for 4 weeks, then if tolerating increase to 0.5 mg weekly thereafter.  RX will be sent to [facility] for both 0.25 mg and 0.5 mg doses. The 0.25 mg dose will be filled. The 0.5 mg dose will be put on hold. Call the pharmacy when ready to fill the 0.5 mg box.     To help with tolerability and effectiveness of Wegovy:  Eat small  "meals/snacks throughout the day (about every 2 hours)  Focus on getting protein in first with each meal and snack.   A good starting goal is 60 g protein daily (track this, especially if at weight loss plateau). Once you consistently are getting 60g daily, try getting 90 g protein daily.  Drink plenty of water - goal 64 oz throughout the day  You may try Metamucil, Benefiber, or Citrucel to help feel more full (less nausea) and have softer, more consistent bowel movements.  To optimize weight management - work on incorporating resistance training/weight lifting to build muscle and improve overall metabolism of adipose tissue.      [employer] employees with [employer] insurance ([facility] Core) are restricted to using the [employer] [facility] Pharmacy for Saxenda, Wegovy, and Zepbound    [facility]  [place], [place] - [address]  Phone: [phone]    Next steps:  After processing the prescription and saving to your profile, the pharmacy will give you an automated call to inform you that they have your prescription on file. This typically occurs within 1-2 days after the prescription is sent but may take 3-5 business days during high volume times.  You will need to call the pharmacy back to set up the first delivery of every new prescription or new medication dose.   Once you are on a stable dose, you can inquire with the pharmacy about signing up for \"Text to Order through [employer]\", \"Auto Fill\", and/or using the \"My [employer] Rx\" renny.     Follow-up: October 7th, 2024 at 9:00 AM      SUBJECTIVE/OBJECTIVE:                          Liyah Rodriguez is a 34 year old female called for an initial visit. She was referred to me from Milligan,Deirdre E.      Reason for visit: [facility] Insurance requirements.      Allergies/ADRs: Reviewed in chart  Past Medical History: Reviewed in chart  Tobacco: She reports that she has never smoked. She has never used smokeless " "tobacco.  Alcohol: Less than 1 beverages / week    Medication Adherence/Access: no issues reported    Medical History:  MEN2/Medullary Thyroid Cancer: none  Pancreatitis: none  Baseline GI symptomatology: none     Pregnancy: discussed, not planning to become pregnant    Weight Management   Nutrition/Eating Habits: recognizes tends to eat past the fullness point, tends to feel more uncomfortable after, eating patterns varies with work schedule (works nights), needs to focus on convenience with 5 yo at home. Eating out more often due to convenience - prepares 1 meal/day at home - pizza, turkey tacos, limited red meat, spaghetti, grilled cheese, chicken fried   Work schedule - dinner with family before work, snack at work (yogurt, balance break snack packs), meal ~2am (leftovers, frozen food meal), other snack/treat at work, working on making sure to eat before going to bed.   Weight watchers in the past.     Medications Tried/Failed:  None.     Initial Consult Weight: 231 lbs (7/24/2024)       Wt Readings from Last 4 Encounters:   07/24/24 105 kg (231 lb 8 oz)   07/13/24 106.8 kg (235 lb 6.4 oz)   07/02/24 106.8 kg (235 lb 6.4 oz)   03/13/24 105.1 kg (231 lb 12.8 oz)     Estimated body mass index is 41.02 kg/m  as calculated from the following:    Height as of 7/24/24: 1.6 m (5' 2.99\").    Weight as of 7/24/24: 105 kg (231 lb 8 oz).          Hypertension   Losartan 25 mg daily.   Patient reports no current medication side effects  Patient does not self-monitor blood pressure.       Mental Health   Depression  Sertraline 50 mg once daily.   Patient reports no current medication side effects.       Supplements   Vit E 800 IU daily as recommended  No reported issues at this time.        ----------------      I spent 41 minutes with this patient today. All changes were made via collaborative practice agreement with Milligan,Deirdre E and Zee Boyd PA-C as one of the credentialed prescriber of the [employer] [facility] " MTM Weight Mgmt Program.   A copy of the visit note was provided to the patient's provider(s).    A summary of these recommendations was sent via Spool.    Ludy Davies, PharmD, BCACP  Medication Therapy Management (MTM) Pharmacist   [facility]    Telemedicine Visit Details  Type of service:  Telephone visit  Start Time:  10:29 AM  End Time:  11:10 AM     Medication Therapy Recommendations  Class 3 severe obesity due to excess calories in adult, unspecified BMI, unspecified whether serious comorbidity present (H)    Current Medication: Semaglutide-Weight Management (WEGOVY) 0.25 MG/0.5ML pen   Rationale: Medication product not available - Adherence - Adherence   Recommendation: Provide Education   Status: Accepted per CPA

## 2024-08-26 NOTE — PATIENT INSTRUCTIONS
Recommendations from today's MT visit:                                                    Pharmacist to start Prior Authorization on Wegovy. Once approved, to start Wegovy 0.25 mg subcutaneous once weekly for 4 weeks, then if tolerating increase to 0.5 mg weekly thereafter.  RX will be sent to [facility] for both 0.25 mg and 0.5 mg doses. The 0.25 mg dose will be filled. The 0.5 mg dose will be put on hold. Call the pharmacy when ready to fill the 0.5 mg box.     To help with tolerability and effectiveness of Wegovy:  Eat small meals/snacks throughout the day (about every 2 hours)  Focus on getting protein in first with each meal and snack.   A good starting goal is 60 g protein daily (track this, especially if at weight loss plateau). Once you consistently are getting 60g daily, try getting 90 g protein daily.  Drink plenty of water - goal 64 oz throughout the day  You may try Metamucil, Benefiber, or Citrucel to help feel more full (less nausea) and have softer, more consistent bowel movements.  To optimize weight management - work on incorporating resistance training/weight lifting to build muscle and improve overall metabolism of adipose tissue.      [place] employees with [employer] insurance ([facility] Core) are restricted to using the [place] [facility] for Saxenda, Wegovy, and Zepbound    [facility]  [place], [place] - [facility] [address]  Phone: [phone]    Next steps:  After processing the prescription and saving to your profile, the pharmacy will give you an automated call to inform you that they have your prescription on file. This typically occurs within 1-2 days after the prescription is sent but may take 3-5 business days during high volume times.  You will need to call the pharmacy back to set up the first delivery of every new prescription or new medication dose.   Once you are on a stable dose, you can inquire  "with the pharmacy about signing up for \"Text to Order through [employer]\", \"Auto Fill\", and/or using the \"[employer] Rx\" renny.     Follow-up: October 7th, 2024 at 9:00 AM    It was great speaking with you today.  I value your experience and would be very thankful for your time in providing feedback in our clinic survey. In the next few days, you may receive an email or text message from [employer] with a link to a survey related to your  clinical pharmacist.\"     To schedule another MTM appointment, please call the clinic directly or you may call the MTM scheduling line at [phone] or toll-free at [phone].     My Clinical Pharmacist's contact information:                                                      Please feel free to contact me with any questions or concerns you have.      Ludy Davies, PharmD, [facility]  Medication Therapy Management (MTM) Pharmacist   [facility]    ---  Wegovy Dosing:   Start Wegovy: It is a subcutaneous injection that you inject once weekly and titrate the dose slowly over time. Make sure inject the same time each day of the week, for example Monday evenings.  Each pen is single use.  In each prescription, you will get 4 pens in each box.  You will need a new prescription for each strength of the medication.  Week 1-4: Inject 0.25 mg once weekly  Week 5-8: If tolerating, increase to 0.5 mg once weekly  Week 9-12: If tolerating, increase to 1 mg once weekly  Week 13-15: If tolerating, increase to 1.7 mg once weekly  Week 16 & on: If tolerating, increase to 2.4 mg once weekly  *If you are having some nausea or other side effects to where you are hesitant to move up to the next dose, stay at the same dose you are on for an additional week to see if side effect(s) improves/resolves. Make sure to take this time to hydrate and ensure you are drinking at least 64 oz water per day.  If you are wanting to stay at the same dose and do not " have additional refills on that prescription please reach out to the clinic.    The goal is to get to a dose that is well tolerated and effective for you. You do not have to go up on the dose each month or get to maximum dose if getting an effective response with minimal side effects.     Wegovy Administration Video: Video that was created by the .  https://www.[employer].com/watch?v=ZV3s3Si0fL7    Wegovy Storage and Stability:   Make sure that when you get the prescription that you store the prescription in the refrigerator until it is time to use the Wegovy pen.  Once it is time to use the Wegovy pen, you can keep the pen at room temperature and it is good for up to 28 days at room temperature.     Wegovy Common Side Effects:   Nausea, diarrhea, constipation, headache, tiredness (fatigue), dizziness, stomach upset/pain. Less commonly, Wegovy can cause low blood sugar (symptoms: shaky, dizzy, sweaty, agitation). Please reach out to the care team should you feel like this is occurring. It is important to ensure that you are eating consistent meals and not skipping meals. Ensure you are getting at least 64 oz water daily.     If cost is prohibitive, you may try using [employer] coupon (https://www.wegovy.com/coverage-and-savings)

## 2024-08-26 NOTE — NURSING NOTE
Is the patient currently in the state of [place]? YES    Visit mode:TELEPHONE    If the visit is dropped, the patient can be reconnected by: TELEPHONE VISIT: Phone number:   Telephone Information:   Mobile [phone]       Will anyone else be joining the visit? NO  (If patient encounters technical issues they should call [phone]    How would you like to obtain your AVS? MyChart    Are changes needed to the allergy or medication list? No    Are refills needed on medications prescribed by this physician? NO    Reason for visit: Medication Therapy Management    Suzan FARMER

## 2024-08-26 NOTE — LETTER
8/26/2024      RE: Liyah Rodriguez  [address]  [place] [address]       Dear Colleague,    Thank you for the opportunity to participate in the care of your patient, Liyah Rodriguez, at the [facility]. Please see a copy of my visit note below.    Medication Therapy Management (MTM) Encounter    ASSESSMENT:                            Medication Adherence/Access: No issues identified    Weight management:   Patient would benefit from additional pharmacotherapy for weight management. Given class III obesity, recommend GLP1/GIP therapy as data to support most significant weight loss. Patient also likely to benefit from reduction in food noise and increased satiety. Negative GI symptomatology at baseline. Negative history of pancreatitis, medullary thyroid cancer and multiple endocrine neoplasia type 2. Reviewed mechanism, adverse effects, monitoring, safety, administration with use of Wegovy.      For patients that are under [employer] insurance coverage, it is mandated by insurance that each qualifying patient meet with hospital based Weight Management Medication Therapy Management pharmacist to continue therapy coverage. The following patient meets the below coverage criteria and can therefore continue GLP-1/GIP agonist therapy for Weight Management:    Adult  BMI >40 with or without comorbidities   OR   BMI >30 + NAFLD*   at time of initiating GLP-1/GIP agonist therapy Approved for 29 weeks  Met Updated Initial Criteria   At least 5% weight loss of baseline body weight  Approved for 12 months        Hypertension   Stable. Continue to monitor for need as weight decreases.     Mental Health   Depression  Stable.      Supplements   Stable.     PLAN:                            Pharmacist to start Prior Authorization on Wegovy. Once approved, to start Wegovy 0.25 mg subcutaneous once  "weekly for 4 weeks, then if tolerating increase to 0.5 mg weekly thereafter.  RX will be sent to [facility] for both 0.25 mg and 0.5 mg doses. The 0.25 mg dose will be filled. The 0.5 mg dose will be put on hold. Call the pharmacy when ready to fill the 0.5 mg box.     To help with tolerability and effectiveness of Wegovy:  Eat small meals/snacks throughout the day (about every 2 hours)  Focus on getting protein in first with each meal and snack.   A good starting goal is 60 g protein daily (track this, especially if at weight loss plateau). Once you consistently are getting 60g daily, try getting 90 g protein daily.  Drink plenty of water - goal 64 oz throughout the day  You may try Metamucil, Benefiber, or Citrucel to help feel more full (less nausea) and have softer, more consistent bowel movements.  To optimize weight management - work on incorporating resistance training/weight lifting to build muscle and improve overall metabolism of adipose tissue.      [facility] employees with [employer] insurance ([facility] Core) are restricted to using the [facility] for Saxenda, Wegovy, and Zepbound    [facility]  [place], [place] - [address]  Phone: [phone]    Next steps:  After processing the prescription and saving to your profile, the pharmacy will give you an automated call to inform you that they have your prescription on file. This typically occurs within 1-2 days after the prescription is sent but may take 3-5 business days during high volume times.  You will need to call the pharmacy back to set up the first delivery of every new prescription or new medication dose.   Once you are on a stable dose, you can inquire with the pharmacy about signing up for \"Text to Order through [employer]\", \"Auto Fill\", and/or using the \"My [employer] Rx\" renny.     Follow-up: October 7th, 2024 at 9:00 AM      SUBJECTIVE/OBJECTIVE:       " "                   Liyah Rodriguez is a 34 year old female called for an initial visit. She was referred to me from Milligan,Deirdre E.      Reason for visit: [facility] Insurance requirements.      Allergies/ADRs: Reviewed in chart  Past Medical History: Reviewed in chart  Tobacco: She reports that she has never smoked. She has never used smokeless tobacco.  Alcohol: Less than 1 beverages / week    Medication Adherence/Access: no issues reported    Medical History:  MEN2/Medullary Thyroid Cancer: none  Pancreatitis: none  Baseline GI symptomatology: none     Pregnancy: discussed, not planning to become pregnant    Weight Management  Nutrition/Eating Habits: recognizes tends to eat past the fullness point, tends to feel more uncomfortable after, eating patterns varies with work schedule (works nights), needs to focus on convenience with 5 yo at home. Eating out more often due to convenience - prepares 1 meal/day at home - pizza, turkey tacos, limited red meat, spaghetti, grilled cheese, chicken fried   Work schedule - dinner with family before work, snack at work (yogurt, balance break snack packs), meal ~2am (leftovers, frozen food meal), other snack/treat at work, working on making sure to eat before going to bed.   Weight watchers in the past.     Medications Tried/Failed:  None.     Initial Consult Weight: 231 lbs (7/24/2024)       Wt Readings from Last 4 Encounters:   07/24/24 105 kg (231 lb 8 oz)   07/13/24 106.8 kg (235 lb 6.4 oz)   07/02/24 106.8 kg (235 lb 6.4 oz)   03/13/24 105.1 kg (231 lb 12.8 oz)     Estimated body mass index is 41.02 kg/m  as calculated from the following:    Height as of 7/24/24: 1.6 m (5' 2.99\").    Weight as of 7/24/24: 105 kg (231 lb 8 oz).          Hypertension  Losartan 25 mg daily.   Patient reports no current medication side effects  Patient does not self-monitor blood pressure.       Mental Health  Depression  Sertraline 50 mg once daily.   Patient reports no current medication " side effects.       Supplements  Vit E 800 IU daily as recommended  No reported issues at this time.        ----------------      I spent 41 minutes with this patient today. All changes were made via collaborative practice agreement with Milligan,Deirdre E and Zee Boyd PA-C as one of the credentialed prescriber of the [employer] [facility] MTM Weight Mgmt Program.   A copy of the visit note was provided to the patient's provider(s).    A summary of these recommendations was sent via [employer]XP[employer].    Ludy Davies, PharmD, BCACP  Medication Therapy Management (MTM) Pharmacist   [facility]    Telemedicine Visit Details  Type of service:  Telephone visit  Start Time:  10:29 AM  End Time:  11:10 AM     Medication Therapy Recommendations  Class 3 severe obesity due to excess calories in adult, unspecified BMI, unspecified whether serious comorbidity present (H)    Current Medication: Semaglutide-Weight Management (WEGOVY) 0.25 MG/0.5ML pen   Rationale: Medication product not available - Adherence - Adherence   Recommendation: Provide Education   Status: Accepted per CPA                Please do not hesitate to contact me if you have any questions/concerns.     Sincerely,     Ludy Davies [facility]

## 2024-08-27 NOTE — TELEPHONE ENCOUNTER
PA Approved for WegoPocketbooky. Order sent to pharmacy. Custom Coupt message sent to patient. Closing encounter

## 2024-08-27 NOTE — TELEPHONE ENCOUNTER
Prior Authorization Approval    Medication: WEGOVY 0.25 MG/0.5ML SC SOAJ  Authorization Effective Date: 8/26/2024  Authorization Expiration Date: 3/17/2025  Approved Dose/Quantity: 1 month  Reference #: [id number]   Insurance Company: [employer] - Phone [phone] Fax [phone]  Expected CoPay: $    CoPay Card Available:      Financial Assistance Needed:    Which Pharmacy is filling the prescription: [facility] - [place], [place] - [facility] [address]  Pharmacy Notified: order sent to pharmacy  Patient Notified: Sounday message sent to patient

## 2024-09-17 ENCOUNTER — OFFICE VISIT (OUTPATIENT)
Dept: SLEEP MEDICINE | Facility: CLINIC | Age: 34
End: 2024-09-17
Payer: COMMERCIAL

## 2024-09-17 DIAGNOSIS — G47.33 OBSTRUCTIVE SLEEP APNEA: ICD-10-CM

## 2024-09-17 PROCEDURE — G0399 HOME SLEEP TEST/TYPE 3 PORTA: HCPCS | Performed by: STUDENT IN AN ORGANIZED HEALTH CARE EDUCATION/TRAINING PROGRAM

## 2024-09-17 NOTE — PROGRESS NOTES
Pt is completing a home sleep test. Pt was instructed on how to put on the Noxturnal T3 device and associated equipment before going to bed and given the opportunity to practice putting it on before leaving the sleep center. Pt was reminded to bring the home sleep test kit back to the center tomorrow, at the scheduled time for download and reporting. Patient was instructed to complete study using the following treatment?  None  Neck circumference: 41 CM / 16 inches.  Device number: 08  Annette Sosa , Home Sleep Studies Specialist  [place]  / [facility]

## 2024-09-18 ENCOUNTER — DOCUMENTATION ONLY (OUTPATIENT)
Dept: SLEEP MEDICINE | Facility: CLINIC | Age: 34
End: 2024-09-18
Payer: COMMERCIAL

## 2024-09-18 NOTE — PROGRESS NOTES
This HSAT was performed using a Noxturnal T3 device which recorded snore, sound, movement activity, body position, nasal pressure, oronasal thermal airflow, pulse, oximetry and both chest and abdominal respiratory effort. HSAT data was restricted to the time patient states they were in bed.     HSAT was scored using 1B 4% hypopnea rule.     HST AHI (Non-PAT): 66.1  Snoring was reported as loud.  Time with SpO2 below 89% was 37.3 minutes.   Overall signal quality was good     Pt will follow up with sleep provider to determine appropriate therapy.

## 2024-09-26 NOTE — PROGRESS NOTES
HST POST-STUDY QUESTIONNAIRE    What time did you go to bed?  22:15  How long do you think it took to fall asleep?  30 min  What time did you wake up to start the day?  06:45  Did you get up during the night at all?  yes  If you woke up, do you remember approximately what time(s)? 00:20  Did you have any difficulty with the equipment?  No  Did you us any type of treatment with this study?  None  Was the head of the bed elevated? No  Did you sleep in a recliner?  No  Did you stop using CPAP at least 3 days before this test?  NA  Any other information you'd like us to know? Woke up and tossed and turned more than normal

## 2024-09-27 NOTE — PROCEDURES
Home Sleep Study Interpretation    Patient: Liyah Rodriguez  MRN: [id number]  YOB: 1990  Study Date: 2024  PCP/Referring Provider: Milligan, Deirdre E, MD;  Ordering Provider: Hunter Martinez MD     Indications for Home Study: Liyah is a 34 Female with a history of HTN and PMD/MDD w/ anxiety who presents with symptoms suggestive of obstructive sleep apnea      Weight: 231.0 lbs  BMI: 41.0   Amorita:   STOP BAN/8      Data: A full night home sleep study was performed recording the standard physiologic parameters including body position, movement, sound, nasal pressure, thermal oral airflow, chest and abdominal movements with respiratory inductance plethysmography, and oxygen saturation by pulse oximetry. Pulse rate was estimated by oximetry recording. This study was considered adequate based on > 4 hours of quality oximetry and respiratory recording. As specified by the AASM Manual for the Scoring of Sleep and Associated events, version 2.3, Rule VIII.D 1B, 4% oxygen desaturation scoring for hypopneas is used as a standard of care on all home sleep apnea testing.    Analysis Time: 720.0 minutes    Respiration:  Sleep Associated Hypoxemia: Sustained hypoxemia was present. Baseline oxygen saturation was 94%. Time with saturation less than 89% was 37.3 minutes. Time with saturation less than 90% was 56.3 minutes. The lowest oxygen saturation was 78.0%.  Snoring: Snoring was present 41% of the time with an average level of 73 dB. Duration of time snoring above 70 dB was 195.2 minutes.    Respiratory events: The home study revealed a presence of 42 obstructive apneas and 0 mixed and central apneas. There were 484 hypopneas resulting in a combined apnea/hypopnea index [AHI] of 66 events per hour with  68 per hour supine, 0  per hour prone, 0 per hour upright, 59  per hour left side, and 70 per hour right side.    Position: Percent of time spent: Supine 72%, prone 0%, upright 0%, on left  20%, on right 8%.    Heart Rate: By Pulse Oximetry normal rate was noted.    Assessment:  severe obstructive sleep apnea.  Sleep associated hypoxemia was present.    Recommendations:  Consider auto-CPAP at 05-15 cmH2O or polysomnography with full night PAP titration  Suggest optimizing sleep hygiene and avoiding sleep deprivation  Weight management    Diagnosis Code(s): Obstructive Sleep Apnea G47.33, Hypoxemia G47.36    Electronically signed by: Hunter Martinez MD September 27, 2024  Diplomate, American Board of Internal Medicine, Sleep Medicine

## 2024-10-03 ENCOUNTER — DOCUMENTATION ONLY (OUTPATIENT)
Dept: SLEEP MEDICINE | Facility: CLINIC | Age: 34
End: 2024-10-03
Payer: COMMERCIAL

## 2024-10-03 DIAGNOSIS — G47.33 OBSTRUCTIVE SLEEP APNEA: Primary | ICD-10-CM

## 2024-10-03 NOTE — PROGRESS NOTES
Patient was offered choice of vendor and chose [facility].  Patient Liyah Rodriguez was set up at [facility] on October 3, 2024. Patient received a Resmed Airsense 10 Pressures were set at  5-15 cm H2O.   Patient s ramp is 5 cm H2O for Auto and FLEX/EPR is EPR, 2.  Patient received a Resmed Mask name: Airfit N30i Nasal mask size Small, heated tubing and heated humidifier.  Patient has the following compliance requirements: none  Patient has a follow up recommended with Dr Martinez.    Tracy L Fahrenkamp

## 2024-10-07 ENCOUNTER — DOCUMENTATION ONLY (OUTPATIENT)
Dept: SLEEP MEDICINE | Facility: CLINIC | Age: 34
End: 2024-10-07
Payer: COMMERCIAL

## 2024-10-07 ENCOUNTER — VIRTUAL VISIT (OUTPATIENT)
Dept: PHARMACY | Facility: CLINIC | Age: 34
End: 2024-10-07
Attending: STUDENT IN AN ORGANIZED HEALTH CARE EDUCATION/TRAINING PROGRAM
Payer: COMMERCIAL

## 2024-10-07 VITALS — WEIGHT: 228 LBS | HEIGHT: 63 IN | BODY MASS INDEX: 40.4 KG/M2

## 2024-10-07 DIAGNOSIS — E66.813 CLASS 3 SEVERE OBESITY DUE TO EXCESS CALORIES IN ADULT, UNSPECIFIED BMI, UNSPECIFIED WHETHER SERIOUS COMORBIDITY PRESENT (H): Primary | ICD-10-CM

## 2024-10-07 DIAGNOSIS — E66.01 CLASS 3 SEVERE OBESITY DUE TO EXCESS CALORIES IN ADULT, UNSPECIFIED BMI, UNSPECIFIED WHETHER SERIOUS COMORBIDITY PRESENT (H): Primary | ICD-10-CM

## 2024-10-07 ASSESSMENT — PAIN SCALES - GENERAL: PAINLEVEL: NO PAIN (0)

## 2024-10-07 NOTE — Clinical Note
Hi Dr. Milligan, Shannon has had a smooth transition in starting the Wegovy! She is going to continue the dose increase to 1 mg and I will follow-up with her again in December, she'll also need a 6 month follow-up with you around Februrary.   Let me know if you have any questions or concerns,  Ludy Davies, PharmD, [facility] Medication Therapy Management (MTM) Pharmacist  [facility] Weight Management [facility]

## 2024-10-07 NOTE — Clinical Note
FYI only, Zee. Patient has PCP in [place]. Per [facility] insurance requirements - used our CPA today, increased Wegovy.   Ria

## 2024-10-07 NOTE — PATIENT INSTRUCTIONS
Recommendations from today's MTM visit:                                                    Continue Wegovy 0.5 mg for 3 more weeks. Then increase to Wegovy 1 mg once weekly, new prescription sent to pharmacy.   Schedule 6 month follow-up with Milligan, Deirdre E for around February 2025.   Aim for 60-90g of protein daily.   General Nutrition Handouts   Protein Sources for Weight Loss   [email or web address]?      Carbohydrates   [email or web address]?     Tips for Weight Loss and Weight Management   [email or web address]    Mindful Eating   [email or web address]?     Daily Food and Exercise Log   [email or web address]?     Meal Replacement Shake Options:   *Protein Shake Criteria: no more than 210 Calories, at least 20 grams of protein, and less than 10 grams of sugar   Premier Protein (160 Calories, 30 g protein)  Slim Fast Advanced Nutrition (180 Calories, 20 g protein)  Muscle Milk, lactose-free, 17 oz bottle (210 Calories, 30 g protein)  Integrated Supplements, no artificial sugars (110 Calories, 20 g protein)  Boost/Ensure Max (160 calories, 30 gm protein)   Fairlife Protein Shakes (160-230 calories, 26-42 gm protein)  Aldi's Elevation Protein Powder (180 calories, 30 gm protein)   Orgain Protein Shakes (130-160 calories, 20-26 gm protein)     Meal Replacement Bar Options:  Quest Protein Bars (190 Calories, 20 g protein)  Built Bar (170 Calories, 15-20 g protein)  One Protein Bar (210 calories, 20 g protein)  Bethune Signature Protein Bar (Costco) (190 Calories, 21 g protein)  Pure Protein Bars (180 Calories, 21 g protein)    Low Calorie Frozen Meal:  Healthy Choice Power Bowls  Lean Cuisine  Smart Ones  Jonas Lea      Follow-up: 12/17/24 at 10am with Ludy Davies, PharmD     It was great speaking with you today.  I value your experience and would be very thankful for your time in providing feedback in our clinic survey. In the next few days, you may  "receive an email or text message from [facility] with a link to a survey related to your  clinical pharmacist.\"     To schedule another MTM appointment, please call the clinic directly or you may call the MTM scheduling line at [phone] or toll-free at [phone].     My Clinical Pharmacist's contact information:                                                      Please feel free to contact me with any questions or concerns you have.      Ludy Davies, PharmD, [facility]  Medication Therapy Management (MTM) Pharmacist   [facility] Weight Management [facility]     "

## 2024-10-07 NOTE — NURSING NOTE
Current patient location: home     Is the patient currently in the state of [place]? YES    Visit mode:TELEPHONE    If the visit is dropped, the patient can be reconnected by: TELEPHONE VISIT: Phone number: [phone]    Will anyone else be joining the visit? NO  (If patient encounters technical issues they should call [phone])    Are changes needed to the allergy or medication list? Pt stated no changes to allergies and Pt stated no med changes    Are refills needed on medications prescribed by this physician? NO    Rooming Documentation:  Not applicable      Reason for visit: Medication Therapy Management    Wt other than 24 hrs:  Friday  Pain more than one location:  no  Carmen FARMER

## 2024-10-07 NOTE — PROGRESS NOTES
Medication Therapy Management (MTM) Encounter    ASSESSMENT:                            Medication Adherence/Access: No issues identified    Weight management:   GLP1 is effective with increasing fullness with meals. Limited side effects supports ongoing dose titration to further efficacy. As dose increases, focus on quality nutrition of food will be important with protein and fiber centric meals to support maintenance of muscle mass with weight loss.     PLAN:                            Continue Wegovy 0.5 mg for 3 more weeks. Then increase to Wegovy 1 mg once weekly, new prescription sent to pharmacy.   Schedule 6 month follow-up with Milligan, Deirdre E for around February 2025.   Aim for 60-90g of protein daily.     General Nutrition Handouts   Protein Sources for Weight Loss   [email or web address]?      Carbohydrates   [email or web address]?     Tips for Weight Loss and Weight Management   [email or web address]    Mindful Eating   [email or web address]?     Daily Food and Exercise Log   [email or web address]?     Meal Replacement Shake Options:   *Protein Shake Criteria: no more than 210 Calories, at least 20 grams of protein, and less than 10 grams of sugar   Premier Protein (160 Calories, 30 g protein)  Slim Fast Advanced Nutrition (180 Calories, 20 g protein)  Muscle Milk, lactose-free, 17 oz bottle (210 Calories, 30 g protein)  Integrated Supplements, no artificial sugars (110 Calories, 20 g protein)  Boost/Ensure Max (160 calories, 30 gm protein)   Fairlife Protein Shakes (160-230 calories, 26-42 gm protein)  Aldi's Elevation Protein Powder (180 calories, 30 gm protein)   Orgain Protein Shakes (130-160 calories, 20-26 gm protein)     Meal Replacement Bar Options:  Quest Protein Bars (190 Calories, 20 g protein)  Built Bar (170 Calories, 15-20 g protein)  One Protein Bar (210 calories, 20 g protein)  Crystal Signature Protein Bar ([employer]co) (190 Calories, 21 g  protein)  Pure Protein Bars (180 Calories, 21 g protein)    Low Calorie Frozen Meal:  Healthy Choice Power Bowls  Lean Shellisine  Smart Ones  Jonas Marco Lea      Follow-up: 12/17/24 at 10am with Ludy Davies PharmD     SUBJECTIVE/OBJECTIVE:                          Liyah Rodriguez is a 34 year old female seen for a follow-up visit.       Reason for visit: Wegovy follow-up.    Allergies/ADRs: Reviewed in chart  Past Medical History: Reviewed in chart  Tobacco: She reports that she has never smoked. She has never used smokeless tobacco.  Alcohol: Less than 1 beverages / week    Medication Adherence/Access: no issues reported      Weight Management   Wegovy 0.5 mg x1 week     Limited side effects now - some nausea the first 1-2 weeks  Feeling escudero faster - difference with feeling full while eating, getting ques from body while eating  Hasn't made as many changes with diet choices - wanted to focus on portion reduction first as a recognized barrier to weight loss  - Breakfast: protein shake, egg bites   - Lunch: sandwhich at home, out to eat   - Dinner: aiming to eat at home more, scheduling dinners ahead of time    Increased energy - able to have some energy after putting daughter to bed at night      Nutrition/Eating Habits: recognizes tends to eat past the fullness point, tends to feel more uncomfortable after, eating patterns varies with work schedule (works nights), needs to focus on convenience with 3 yo at home. Eating out more often due to convenience - prepares 1 meal/day at home - pizza, turkey tacos, limited red meat, spaghetti, grilled cheese, chicken fried   Work schedule - dinner with family before work, snack at work (yogurt, balance break snack packs), meal ~2am (leftovers, frozen food meal), other snack/treat at work, working on making sure to eat before going to bed.   Weight watchers in the past.     Medications Tried/Failed:  None.     Initial Consult Weight: 235 lbs (7/13/2024)        "  Current weight today: 228 lbs 0 oz  Cumulative Weight Loss: -7 lb, -2.9% from baseline    Wt Readings from Last 4 Encounters:   10/07/24 228 lb (103.4 kg)   07/24/24 231 lb 8 oz (105 kg)   07/13/24 235 lb 6.4 oz (106.8 kg)   07/02/24 235 lb 6.4 oz (106.8 kg)     Estimated body mass index is 40.39 kg/m  as calculated from the following:    Height as of this encounter: 5' 3\" (1.6 m).    Weight as of this encounter: 228 lb (103.4 kg).      Today's Vitals: Ht 5' 3\" (1.6 m)   Wt 228 lb (103.4 kg)   BMI 40.39 kg/m    ----------------      I spent 20 minutes with this patient today. All changes were made via collaborative practice agreement with Zee Boyd PA-C as one of the credentialed prescriber of the [employer] MTM Weight Mgmt Program. A copy of the visit note was provided to the patient's provider(s).    A summary of these recommendations was sent via [employer].    Ludy Davies, PharmD, BCACP  Medication Therapy Management (MTM) Pharmacist   [facility]      Telemedicine Visit Details  The patient's medications can be safely assessed via a telemedicine encounter.  Type of service:  Telephone visit  Originating Location (pt. Location): Home    Distant Location (provider location):  Off-site  Start Time:  8:55 AM  End Time:  9:15 AM     Medication Therapy Recommendations  Class 3 severe obesity due to excess calories in adult, unspecified BMI, unspecified whether serious comorbidity present (H)    Rationale: Dose too low - Dosage too low - Effectiveness   Recommendation: Increase Dose   Status: Accepted per CPA            "

## 2024-10-18 ENCOUNTER — DOCUMENTATION ONLY (OUTPATIENT)
Dept: SLEEP MEDICINE | Facility: CLINIC | Age: 34
End: 2024-10-18
Payer: COMMERCIAL

## 2024-10-18 NOTE — PROGRESS NOTES
14  DAY STM VISIT    Diagnostic AHI:  HST: 66.1        Message left for patient to return call.     Assessment: Pt meeting objective benchmarks.       Action plan: waiting for patient to return call.  and pt to have 30 day STM visit.      Device type: Auto-CPAP    PAP settings: CPAP min 5.0 cm  H20       CPAP max 15.0 cm  H20      95th% pressure 9.9 cm  H20        RESMED EPR level Setting: TWO    RESMED Soft response setting:  OFF    Mask type:      Objective measures: 14 day rolling measures      Compliance  78 %      Leak  3.04  lpm  last  upload      AHI 2.73   last  upload      Average number of minutes 450      Objective measure goal  Compliance   Goal >70%  Leak   Goal < 24 lpm  AHI  Goal < 5  Usage  Goal >240    Patient has the following upcoming sleep appts:      Total time spent on accessing and interpreting remote patient PAP therapy data  10 minutes    Total time spent counseling, coaching  and reviewing PAP therapy data with patient  1 minutes    [id number]  [id number]  no (3 day STM)

## 2024-12-17 ENCOUNTER — VIRTUAL VISIT (OUTPATIENT)
Dept: PHARMACY | Facility: CLINIC | Age: 34
End: 2024-12-17
Attending: STUDENT IN AN ORGANIZED HEALTH CARE EDUCATION/TRAINING PROGRAM
Payer: COMMERCIAL

## 2024-12-17 VITALS — WEIGHT: 225 LBS | BODY MASS INDEX: 39.86 KG/M2

## 2024-12-17 DIAGNOSIS — E66.813 CLASS 3 SEVERE OBESITY DUE TO EXCESS CALORIES IN ADULT, UNSPECIFIED BMI, UNSPECIFIED WHETHER SERIOUS COMORBIDITY PRESENT (H): Primary | ICD-10-CM

## 2024-12-17 DIAGNOSIS — E66.01 CLASS 3 SEVERE OBESITY DUE TO EXCESS CALORIES IN ADULT, UNSPECIFIED BMI, UNSPECIFIED WHETHER SERIOUS COMORBIDITY PRESENT (H): Primary | ICD-10-CM

## 2024-12-17 NOTE — PATIENT INSTRUCTIONS
"Recommendations from today's MTM visit:                                                    Increase Wegovy 1.7 mg once weekly, updated prescription sent for 3 month supply.   Reach out to [facility] for delivery if you don't hear from them.   [facility]    Phone: [phone]  - Press 2 to leave a refill request on a secured voicemail  - Press 3 to place an automated refill request  - Press 4 to speak directly to a member of the pharmacy staff    Hours: Monday through Friday 8am to 7pm and Saturday 8am to 4pm     Follow-up: 1/21/25 with Milligan, Deirdre E for ongoing GLP1 management  - PharmD available to help with transition as needed     It was great speaking with you today.  I value your experience and would be very thankful for your time in providing feedback in our clinic survey. In the next few days, you may receive an email or text message from [facility] [employer] with a link to a survey related to your  clinical pharmacist.\"     To schedule another MTM appointment, please call the clinic directly or you may call the MTM scheduling line at [phone] or toll-free at [phone].     My Clinical Pharmacist's contact information:                                                      Please feel free to contact me with any questions or concerns you have.      Ludy Davies, PharmD, [facility]  Medication Therapy Management (MTM) Pharmacist   [facility]     "

## 2024-12-17 NOTE — NURSING NOTE
Current patient location:  [place]    Is the patient currently in the state of [place]? YES    Visit mode:TELEPHONE    If the visit is dropped, the patient can be reconnected by: TELEPHONE VISIT: Phone number: [phone]    Will anyone else be joining the visit? NO  (If patient encounters technical issues they should call [phone])    Are changes needed to the allergy or medication list? No    Are refills needed on medications prescribed by this physician? Maybe wegovy    Reason for visit: Medication Therapy Management    Madeline Vo VVF

## 2024-12-17 NOTE — Clinical Note
Hello Dr. Milligan,   I had a follow-up with Liyah today re: GLP1 for weight management. As of January 2025, "[employer] employees will no longer have coverage for GLP1s for weight management. This is a new change.   Liyah plans to transition back to you for weight management (vs sharing patients with Medication Therapy Management at [facility]). I want to be supportive with this change/transition. I did discuss compounded semaglutide as an option that brings down cost slightly. These can be prescribed through your clinic, happy to help if needed with those initial orders.   There is no action needed at this time. Patient is aware of this change and elected to continue GLP1 for now. Interested in considering compounded medication or alternate oral medications in future. Offering support to you and patient with the transition.   Let me know if you have any questions or concerns, Ludy Davies, PharmD, BCACP MTM Pharmacist, [facility]

## 2024-12-17 NOTE — PROGRESS NOTES
Medication Therapy Management (MTM) Encounter    ASSESSMENT:                            Medication Adherence/Access: See below for considerations.    Weight management   Patient would benefit from continuing pharmacotherapy for weight management. Given tolerating well and class III obesity, recommend increasing GLP1/GIP therapy as data to support most significant weight loss. Further benefit from reduction in food noise, increased satiety and reduction of cravings would be beneficial with increased dose. Education provided on evidence showing possible increase in weight regain with higher doses. Benefit of increased efficacy for 3 months outweighs risk. Patient knowledgeable about noting lifestyle modifications, choices to support maintenance. Education provided on continued dietary and behavioral modifications with focus on protein and water to optimize effectiveness of Wegovy.     Due to [facility] insurance discontinuing coverage of GLP1 agonists for Weight Management in 2025 year, much of today's discussion was spent discussing next steps including alternative injectable options - paying out of pocket w/ savings card cost, compound product through [facility], and others. From this, patient wishing to  transition to alternate oral medication .      PLAN:                            Increase Wegovy 1.7 mg once weekly, updated prescription sent for 3 month supply.   Reach out to [facility] for delivery if you don't hear from them.   [facility]    Phone: [phone]  - Press 2 to leave a refill request on a secured voicemail  - Press 3 to place an automated refill request  - Press 4 to speak directly to a member of the pharmacy staff    Hours: Monday through Friday 8am to 7pm and Saturday 8am to 4pm     Follow-up: 1/21/25 with Milligan, Deirdre E for ongoing GLP1 management  - PharmD available to help with transition as needed      SUBJECTIVE/OBJECTIVE:                          Liyah Rodriguez is a 34 year old female seen for a follow-up visit.       Reason for visit: Wegovy follow-up.     Allergies/ADRs: Reviewed in chart  Past Medical History: Reviewed in chart  Tobacco: She reports that she has never smoked. She has never used smokeless tobacco.  Alcohol: Less than 1 beverages / week    Medication Adherence/Access: GLP-1 RA not covered on formulary next year       Weight Management   Wegovy 1 mg     Limited side effects now - slight constipation but manageable.   Feeling escudero faster overall since starting medication but did not change much with increase to 1 mg dose.   Limited non-scale changes as well  Food noise, cravings are still present - able to continue eating foods that taste good - feels slightly overfilled after but not nauseous or uncomfortable.    Maintained increased energy - able to have some energy after putting daughter to bed at night    Notes from Aug 2024 visit:  Nutrition/Eating Habits: recognizes tends to eat past the fullness point, tends to feel more uncomfortable after, eating patterns varies with work schedule (works nights), needs to focus on convenience with 5 yo at home. Eating out more often due to convenience - prepares 1 meal/day at home - pizza, turkey tacos, limited red meat, spaghetti, grilled cheese, chicken fried   Work schedule - dinner with family before work, snack at work (yogurt, balance break snack packs), meal ~2am (leftovers, frozen food meal), other snack/treat at work, working on making sure to eat before going to bed.   Weight watchers in the past.     Medications Tried/Failed:  None.     Initial Consult Weight: 235 lbs (7/13/2024)       Current weight today: 225 lbs 0 oz  Cumulative Weight Loss: -10 lb, -4.25% from baseline    Wt Readings from Last 4 Encounters:   12/17/24 225 lb (102.1 kg)   10/07/24 228 lb (103.4 kg)   07/24/24 231 lb 8 oz (105 kg)   07/13/24 235 lb 6.4 oz (106.8 kg)  "    Estimated body mass index is 39.86 kg/m  as calculated from the following:    Height as of 10/7/24: 5' 3\" (1.6 m).    Weight as of this encounter: 225 lb (102.1 kg).        Today's Vitals: Wt 225 lb (102.1 kg)   BMI 39.86 kg/m    ----------------    I spent 20 minutes with this patient today. All changes were made via collaborative practice agreement with Milligan,Deirdre E and Zee Boyd PA-C as one of the credentialed prescriber of the [facility] MTM Weight Mgmt Program.      A summary of these recommendations was sent via [employer].    Ludy Davies, PharmD, BCACP  Medication Therapy Management (MTM) Pharmacist   [facility]     Telemedicine Visit Details  The patient's medications can be safely assessed via a telemedicine encounter.  Type of service:  Telephone visit  Originating Location (pt. Location): Home    Distant Location (provider location):  On-site  Start Time: 10:01 AM  End Time: 10:21 AM     Medication Therapy Recommendations  Class 3 severe obesity due to excess calories in adult, unspecified BMI, unspecified whether serious comorbidity present (H)   1 Rationale: Dose too low - Dosage too low - Effectiveness   Recommendation: Increase Dose   Status: Accepted per CPA   Identified Date: 12/17/2024 Completed Date: 12/17/2024            "

## 2024-12-19 NOTE — TELEPHONE ENCOUNTER
Can she send an Evisit so I can do it this evening and it will come to me with her questions?    I can also send her the list of clinics to choose from for next steps if letrozole doesn't work, etc  Thanks  Yvonne Reese MD     No

## 2025-01-03 ENCOUNTER — OFFICE VISIT (OUTPATIENT)
Dept: OTOLARYNGOLOGY | Facility: CLINIC | Age: 35
End: 2025-01-03
Attending: STUDENT IN AN ORGANIZED HEALTH CARE EDUCATION/TRAINING PROGRAM
Payer: COMMERCIAL

## 2025-01-03 DIAGNOSIS — G47.33 OBSTRUCTIVE SLEEP APNEA: ICD-10-CM

## 2025-01-03 DIAGNOSIS — R09.81 NASAL CONGESTION: Primary | ICD-10-CM

## 2025-01-03 PROCEDURE — 99203 OFFICE O/P NEW LOW 30 MIN: CPT | Performed by: OTOLARYNGOLOGY

## 2025-01-03 RX ORDER — AZELASTINE 1 MG/ML
SPRAY, METERED NASAL
Qty: 30 ML | Refills: 11 | Status: SHIPPED | OUTPATIENT
Start: 2025-01-03

## 2025-01-03 NOTE — PATIENT INSTRUCTIONS
Consider referral to Bariatric Medicine (Dr. Kashif Pérez)      You were seen in the ENT Clinic today by Dr. Andino. If you have any questions or concerns after your appointment, please contact us (see below).    2.   Please return to clinic as needed    3. Astelin nasal spray was sent to your pharmacy.         How to Contact Us:  Send a [employer] message to your provider. Our team will respond to you via [employer]. Occasionally, we will need to call you to get further information.  For urgent matters (Monday-Friday), call the ENT Clinic: [phone] and speak with a call center team member - they will route your call appropriately.   If you'd like to speak directly with a nurse, please find our contact information below. We do our best to check voicemail frequently throughout the day, and will work to call you back within 1-2 days. For urgent matters, please use the general clinic phone numbers listed above.      Portia Harvey RN  [place] [id number] [address] [address]  [email or web address]  Office: [phone]  Fax: [phone]

## 2025-01-03 NOTE — NURSING NOTE
Sino-Nasal Outcome Test (SNOT - 22)    1. Need to Blow Nose: (Proxy-Rptd) (P) Moderate  2. Nasal Blockage: (Proxy-Rptd) (P) Mild or slight  3. Sneezing: (Proxy-Rptd) (P) Very mild  4. Runny Nose: (Proxy-Rptd) (P) Mild or slight  5. Cough: (Proxy-Rptd) (P) Very mild  6. Post-nasal discharge: (Proxy-Rptd) (P) Very mild  7. Thick nasal discharge: (Proxy-Rptd) (P) None  8. Ear fullness: (Proxy-Rptd) (P) None  9. Dizziness: (Proxy-Rptd) (P) None  10. Ear Pain: (Proxy-Rptd) (P) None  11. Facial pain/pressure: (Proxy-Rptd) (P) None  12. Decreased Sense of Smell/Taste: (Proxy-Rptd) (P) None  13. Difficulty falling asleep: (Proxy-Rptd) (P) Very mild  14. Wake up at night: (Proxy-Rptd) (P) Very mild  15. Lack of a good night's sleep: (Proxy-Rptd) (P) Very mild  16. Wake up tired: (Proxy-Rptd) (P) Moderate  17. Fatigue: (Proxy-Rptd) (P) Mild or slight  18. Reduced Productivity: (Proxy-Rptd) (P) Mild or slight  19. Reduced Concentration: (Proxy-Rptd) (P) None  20. Frustrated/restless/irritable: (Proxy-Rptd) (P) None  21. Sad: (Proxy-Rptd) (P) None  22. Embarrassed: (Proxy-Rptd) (P) None    Total Score: (Proxy-Rptd) (P) 20    COPYRIGHT 1996. [facility]

## 2025-01-03 NOTE — LETTER
1/3/2025      Liyah Rodriguez  [address]  [place] [address]      Dear Colleague,    Thank you for referring your patient, Liyah Rodriguez, to the [facility]. Please see a copy of my visit note below.    CHIEF COMPLAINT: Patient presents with:  Ent Problem: QUEENIE, just started CPAP-doesn't want inspire, AHI 67; patient looking for additional treatment options in addition to CPAP. Sleep study from 9/27 showed severe QUEENIE. Wondering if anything anatomical that is causing sx. SNOT = 20.         HISTORY OF PRESENT ILLNESS    Liyah was seen at the behest of Hunter Martinez MD for sleep concerns.    SLEEP MEDICINE NOTE:    Assessment:  severe obstructive sleep apnea.  Sleep associated hypoxemia was present.     Recommendations:  Consider auto-CPAP at 05-15 cmH2O or polysomnography with full night PAP titration  Suggest optimizing sleep hygiene and avoiding sleep deprivation  Weight management     Diagnosis Code(s): Obstructive Sleep Apnea G47.33, Hypoxemia G47.3             REVIEW OF SYSTEMS    Review of Systems as per HPI and PMHx, otherwise 10 system review system are negative.       ALLERGIES    Nkda [no known drug allergy]    CURRENT MEDICATIONS      Current Outpatient Medications:      azelastine (ASTELIN) 0.1 % nasal spray, 2 sprays each nostril 1-2x daily as needed for nasal congestion (use nightly for first 2 week), Disp: 30 mL, Rfl: 11     fluocinolone acetonide (DERMA SMOOTHE/FS BODY) 0.01 % external oil, Apply daily as needed for worsening scalp itching and scale., Disp: 118.28 mL, Rfl: 4     ketoconazole (NIZORAL) 2 % external shampoo, Apply 2-3 times weekly to the scalp. Leave in place for at least 5 minutes, then rinse out., Disp: 120 mL, Rfl: 11     levonorgestrel (MIRENA) 52 MG (20 mcg/day) IUD, 1 each by Intrauterine route once, Disp: , Rfl:      losartan (COZAAR) 25 MG tablet, Take 1 tablet (25 mg) by mouth daily, Disp: 90 tablet, Rfl: 4      Semaglutide-Weight Management (WEGOVY) 1.7 MG/0.75ML pen, Inject 1.7 mg subcutaneously once a week., Disp: 9 mL, Rfl: 0     sertraline (ZOLOFT) 50 MG tablet, Take 1 tablet (50 mg) by mouth daily, Disp: 90 tablet, Rfl: 4     vitamin E (TOCOPHEROL) 400 units (180 mg) capsule, Take 2 capsules by mouth daily., Disp: , Rfl:      PAST MEDICAL HISTORY    PAST MEDICAL HISTORY:   Past Medical History:   Diagnosis Date     Depressive disorder      Hypertension     side effect of estrogen birth control, and during pregnancy     IUD (intrauterine device) in place 2023    Mirena     NO ACTIVE PROBLEMS        PAST SURGICAL HISTORY    PAST SURGICAL HISTORY:   Past Surgical History:   Procedure Laterality Date     ACL repair  2008     DILATION AND CURETTAGE SUCTION N/A 2019    Procedure: Suction Dilation and Curettage   (9 Weeks and 5 Days);  Surgeon: Coni Barnett MD;  Location: [facility] OR     ORTHOPEDIC SURGERY Right     knee surgery     Right lateral menisectomy  2008     XR HYSTEROSALPINGOGRAM  2019    normal fill and spill       FAMILY  HISTORY    FAMILY HISTORY:   Family History   Problem Relation Age of Onset     Hypertension Mother      Cancer Father 50        lymphoma     Aneurysm Father 63        spotty vision, occasional weakness prior- then sudden death, smoker     Hypertension Father      Cerebrovascular Disease Father          of brain aneurysm at age 63     Other Cancer Father         Non-hodgkins lymphoma     Hypertension Maternal Grandmother      High cholesterol Maternal Grandmother      Cancer Maternal Grandfather         lung cancer     Heart Disease Paternal Grandmother         CHF     Melanoma No family hx of      Skin Cancer No family hx of        SOCIAL HISTORY    SOCIAL HISTORY:   Social History     Tobacco Use     Smoking status: Never     Smokeless tobacco: Never   Substance Use Topics     Alcohol use: Yes     Comment: occasional, 2-3 drinks per month        PHYSICAL  EXAM    HEAD: Normal appearance and symmetry:  No cutaneous lesions.      NECK:  supple     EARS:    Right:   TM intact   LEFT:   TM intact    EYES:  EOMI    CN VII/XII:  intact     NOSE:     Dorsum:   straight  Septum:  midline  Mucosa:  moist  Turbinates: 3+ right 2+ left        ORAL CAVITY/OROPHARYNX:     Lips:  Normal.  Tongue: normal, midline  Mucosa:   no lesions     NECK:  Trachea:  midline.              Thyroid:  normal              Adenopathy:  none        NEURO:   Alert and Oriented     GAIT AND STATION:  normal     RESPIRATORY:   Symmetry and Respiratory effort     PSYCH:  Normal mood and affect     SKIN:   warm and dry         IMPRESSION:    Encounter Diagnoses   Name Primary?     Obstructive sleep apnea      Nasal congestion Yes       RECOMMENDATIONS:    Orders Placed This Encounter   Medications     azelastine (ASTELIN) 0.1 % nasal spray     Si sprays each nostril 1-2x daily as needed for nasal congestion (use nightly for first 2 week)     Dispense:  30 mL     Refill:  11      Consider referral to Bariatric Medicine      Again, thank you for allowing me to participate in the care of your patient.        Sincerely,        Dionicio Andino MD    Electronically signed

## 2025-01-03 NOTE — PROGRESS NOTES
CHIEF COMPLAINT: Patient presents with:  Ent Problem: QUEENIE, just started CPAP-doesn't want inspire, AHI 67; patient looking for additional treatment options in addition to CPAP. Sleep study from 9/27 showed severe QUEENIE. Wondering if anything anatomical that is causing sx. SNOT = 20.         HISTORY OF PRESENT ILLNESS    Liyah was seen at the behest of Hunter Martinez MD for sleep concerns.    SLEEP MEDICINE NOTE:    Assessment:  severe obstructive sleep apnea.  Sleep associated hypoxemia was present.     Recommendations:  Consider auto-CPAP at 05-15 cmH2O or polysomnography with full night PAP titration  Suggest optimizing sleep hygiene and avoiding sleep deprivation  Weight management     Diagnosis Code(s): Obstructive Sleep Apnea G47.33, Hypoxemia G47.3             REVIEW OF SYSTEMS    Review of Systems as per HPI and PMHx, otherwise 10 system review system are negative.       ALLERGIES    Nkda [no known drug allergy]    CURRENT MEDICATIONS      Current Outpatient Medications:     azelastine (ASTELIN) 0.1 % nasal spray, 2 sprays each nostril 1-2x daily as needed for nasal congestion (use nightly for first 2 week), Disp: 30 mL, Rfl: 11    fluocinolone acetonide (DERMA SMOOTHE/FS BODY) 0.01 % external oil, Apply daily as needed for worsening scalp itching and scale., Disp: 118.28 mL, Rfl: 4    ketoconazole (NIZORAL) 2 % external shampoo, Apply 2-3 times weekly to the scalp. Leave in place for at least 5 minutes, then rinse out., Disp: 120 mL, Rfl: 11    levonorgestrel (MIRENA) 52 MG (20 mcg/day) IUD, 1 each by Intrauterine route once, Disp: , Rfl:     losartan (COZAAR) 25 MG tablet, Take 1 tablet (25 mg) by mouth daily, Disp: 90 tablet, Rfl: 4    Semaglutide-Weight Management (WEGOVY) 1.7 MG/0.75ML pen, Inject 1.7 mg subcutaneously once a week., Disp: 9 mL, Rfl: 0    sertraline (ZOLOFT) 50 MG tablet, Take 1 tablet (50 mg) by mouth daily, Disp: 90 tablet, Rfl: 4    vitamin E (TOCOPHEROL) 400 units (180 mg)  capsule, Take 2 capsules by mouth daily., Disp: , Rfl:      PAST MEDICAL HISTORY    PAST MEDICAL HISTORY:   Past Medical History:   Diagnosis Date    Depressive disorder     Hypertension     side effect of estrogen birth control, and during pregnancy    IUD (intrauterine device) in place 2023    Mirena    NO ACTIVE PROBLEMS        PAST SURGICAL HISTORY    PAST SURGICAL HISTORY:   Past Surgical History:   Procedure Laterality Date    ACL repair  2008    DILATION AND CURETTAGE SUCTION N/A 2019    Procedure: Suction Dilation and Curettage   (9 Weeks and 5 Days);  Surgeon: Coni Barnett MD;  Location: [facility]    ORTHOPEDIC SURGERY Right     knee surgery    Right lateral menisectomy  2008    XR HYSTEROSALPINGOGRAM  2019    normal fill and spill       FAMILY  HISTORY    FAMILY HISTORY:   Family History   Problem Relation Age of Onset    Hypertension Mother     Cancer Father 50        lymphoma    Aneurysm Father 63        spotty vision, occasional weakness prior- then sudden death, smoker    Hypertension Father     Cerebrovascular Disease Father          of brain aneurysm at age 63    Other Cancer Father         Non-hodgkins lymphoma    Hypertension Maternal Grandmother     High cholesterol Maternal Grandmother     Cancer Maternal Grandfather         lung cancer    Heart Disease Paternal Grandmother         CHF    Melanoma No family hx of     Skin Cancer No family hx of        SOCIAL HISTORY    SOCIAL HISTORY:   Social History     Tobacco Use    Smoking status: Never    Smokeless tobacco: Never   Substance Use Topics    Alcohol use: Yes     Comment: occasional, 2-3 drinks per month        PHYSICAL EXAM    HEAD: Normal appearance and symmetry:  No cutaneous lesions.      NECK:  supple     EARS:    Right:   TM intact   LEFT:   TM intact    EYES:  EOMI    CN VII/XII:  intact     NOSE:     Dorsum:   straight  Septum:  midline  Mucosa:  moist  Turbinates: 3+ right 2+ left        ORAL  CAVITY/OROPHARYNX:     Lips:  Normal.  Tongue: normal, midline  Mucosa:   no lesions     NECK:  Trachea:  midline.              Thyroid:  normal              Adenopathy:  none        NEURO:   Alert and Oriented     GAIT AND STATION:  normal     RESPIRATORY:   Symmetry and Respiratory effort     PSYCH:  Normal mood and affect     SKIN:   warm and dry         IMPRESSION:    Encounter Diagnoses   Name Primary?    Obstructive sleep apnea     Nasal congestion Yes       RECOMMENDATIONS:    Orders Placed This Encounter   Medications    azelastine (ASTELIN) 0.1 % nasal spray     Si sprays each nostril 1-2x daily as needed for nasal congestion (use nightly for first 2 week)     Dispense:  30 mL     Refill:  11      Consider referral to Bariatric Medicine

## 2025-01-21 ENCOUNTER — OFFICE VISIT (OUTPATIENT)
Dept: PEDIATRICS | Facility: CLINIC | Age: 35
End: 2025-01-21
Payer: COMMERCIAL

## 2025-01-21 VITALS
HEART RATE: 87 BPM | WEIGHT: 235.2 LBS | RESPIRATION RATE: 18 BRPM | DIASTOLIC BLOOD PRESSURE: 88 MMHG | HEIGHT: 63 IN | SYSTOLIC BLOOD PRESSURE: 121 MMHG | TEMPERATURE: 98.2 F | OXYGEN SATURATION: 100 % | BODY MASS INDEX: 41.67 KG/M2

## 2025-01-21 DIAGNOSIS — R74.01 ELEVATED ALT MEASUREMENT: Primary | ICD-10-CM

## 2025-01-21 DIAGNOSIS — E66.01 MORBID OBESITY (H): ICD-10-CM

## 2025-01-21 DIAGNOSIS — I10 BENIGN ESSENTIAL HYPERTENSION: ICD-10-CM

## 2025-01-21 DIAGNOSIS — K76.0 METABOLIC DYSFUNCTION-ASSOCIATED STEATOTIC LIVER DISEASE (MASLD): ICD-10-CM

## 2025-01-21 DIAGNOSIS — Z82.49 FAMILY HISTORY OF BRAIN ANEURYSM: ICD-10-CM

## 2025-01-21 PROCEDURE — G2211 COMPLEX E/M VISIT ADD ON: HCPCS | Performed by: STUDENT IN AN ORGANIZED HEALTH CARE EDUCATION/TRAINING PROGRAM

## 2025-01-21 PROCEDURE — 82248 BILIRUBIN DIRECT: CPT | Performed by: STUDENT IN AN ORGANIZED HEALTH CARE EDUCATION/TRAINING PROGRAM

## 2025-01-21 PROCEDURE — 99213 OFFICE O/P EST LOW 20 MIN: CPT | Performed by: STUDENT IN AN ORGANIZED HEALTH CARE EDUCATION/TRAINING PROGRAM

## 2025-01-21 PROCEDURE — 36415 COLL VENOUS BLD VENIPUNCTURE: CPT | Performed by: STUDENT IN AN ORGANIZED HEALTH CARE EDUCATION/TRAINING PROGRAM

## 2025-01-21 PROCEDURE — 80053 COMPREHEN METABOLIC PANEL: CPT | Performed by: STUDENT IN AN ORGANIZED HEALTH CARE EDUCATION/TRAINING PROGRAM

## 2025-01-21 ASSESSMENT — PAIN SCALES - GENERAL: PAINLEVEL_OUTOF10: NO PAIN (0)

## 2025-01-21 NOTE — PROGRESS NOTES
"  Assessment & Plan     Elevated ALT measurement  Abdominal ultrasound consistent with MASLD. Taking vitamin E.  - Comprehensive metabolic panel (BMP + Alb, Alk Phos, ALT, AST, Total. Bili, TP); Future  - Comprehensive metabolic panel (BMP + Alb, Alk Phos, ALT, AST, Total. Bili, TP)    Family history of brain aneurysm  Father  of fatal brain aneurysm rupture in 60s. Patient without prior head imaging. Will plan to obtain baseline MRA and plan for every 5 year follow up. Patient had previous discussion about this with cardiology. Patient risk factors include hypertension and female gender.  - MRA Brain ([facility]) w Contrast; Future  - Comprehensive metabolic panel (BMP + Alb, Alk Phos, ALT, AST, Total. Bili, TP); Future  - Comprehensive metabolic panel (BMP + Alb, Alk Phos, ALT, AST, Total. Bili, TP)    Morbid obesity (H)  Currently on wegovy without significant weight loss. Will plan to transition to stimulant oral medication: either vyvanse if ADHD diagnosis or phentermine if not. I will [employer] message next week to follow up on formal ADHD evaluation diagnosis and send in prescription. Follow up in 3 months.    Benign essential hypertension  Blood pressure well controlled on ARB. Has IUD (intrauterine device) in place.  - Comprehensive metabolic panel (BMP + Alb, Alk Phos, ALT, AST, Total. Bili, TP); Future  - Comprehensive metabolic panel (BMP + Alb, Alk Phos, ALT, AST, Total. Bili, TP)    Metabolic dysfunction-associated steatotic liver disease (MASLD)  - Bilirubin direct    BMI  Estimated body mass index is 41.66 kg/m  as calculated from the following:    Height as of this encounter: 1.6 m (5' 3\").    Weight as of this encounter: 106.7 kg (235 lb 3.2 oz).             The longitudinal plan of care for the diagnosis(es)/condition(s) as documented were addressed during this visit. Due to the added complexity in care, I will continue to support Liyah in the subsequent management and with ongoing " "continuity of care.    Subjective   Liyah is a 34 year old, presenting for the following health issues:  Weight Check        1/21/2025     2:16 PM   Additional Questions   Roomed by Angela Henry   Accompanied by N/A     History of Present Illness       Reason for visit:  Follow-up for fatty liver disease and weight loss medication    She eats 0-1 servings of fruits and vegetables daily.She consumes 0 sweetened beverage(s) daily.She exercises with enough effort to increase her heart rate 30 to 60 minutes per day.  She exercises with enough effort to increase her heart rate 3 or less days per week.   She is taking medications regularly.     ADHD assessment: Did second portion January 3rd and waiting for final results    Blood pressure  -no lightheadedness or dizziness      Still working night shift      Wt Readings from Last 4 Encounters:   01/21/25 106.7 kg (235 lb 3.2 oz)   12/17/24 102.1 kg (225 lb)   10/07/24 103.4 kg (228 lb)   07/24/24 105 kg (231 lb 8 oz)                     Objective    /88 (BP Location: Right arm, Patient Position: Sitting, Cuff Size: Adult Large)   Pulse 87   Temp 98.2  F (36.8  C) (Temporal)   Resp 18   Ht 1.6 m (5' 3\")   Wt 106.7 kg (235 lb 3.2 oz)   LMP 12/30/2024 (Approximate)   SpO2 100%   BMI 41.66 kg/m    Body mass index is 41.66 kg/m .  Physical Exam   GEN: Alert and appropriately interactive for age  EYES: Eyes grossly normal to inspection, conjunctivae and sclerae normal  RESP: Breathing comfortably on room air   CV: Warm and well perfused peripheral extremities   MS: no gross musculoskeletal defects noted, no edema  NEURO: Alert and oriented. Speech fluent.    PSYCH: Affect normal/bright. Appearance well groomed.               Signed Electronically by: Deirdre E. Milligan, MD    "

## 2025-01-22 LAB
ALBUMIN SERPL BCG-MCNC: 4.2 G/DL (ref 3.5–5.2)
ALP SERPL-CCNC: 60 U/L (ref 40–150)
ALT SERPL W P-5'-P-CCNC: 34 U/L (ref 0–50)
ANION GAP SERPL CALCULATED.3IONS-SCNC: 16 MMOL/L (ref 7–15)
AST SERPL W P-5'-P-CCNC: 28 U/L (ref 0–45)
BILIRUB DIRECT SERPL-MCNC: <0.2 MG/DL (ref 0–0.3)
BILIRUB SERPL-MCNC: 0.2 MG/DL
BUN SERPL-MCNC: 14 MG/DL (ref 6–20)
CALCIUM SERPL-MCNC: 9 MG/DL (ref 8.8–10.4)
CHLORIDE SERPL-SCNC: 103 MMOL/L (ref 98–107)
CREAT SERPL-MCNC: 0.73 MG/DL (ref 0.51–0.95)
EGFRCR SERPLBLD CKD-EPI 2021: >90 ML/MIN/1.73M2
GLUCOSE SERPL-MCNC: 73 MG/DL (ref 70–99)
HCO3 SERPL-SCNC: 20 MMOL/L (ref 22–29)
POTASSIUM SERPL-SCNC: 4.1 MMOL/L (ref 3.4–5.3)
PROT SERPL-MCNC: 6.8 G/DL (ref 6.4–8.3)
SODIUM SERPL-SCNC: 139 MMOL/L (ref 135–145)

## 2025-02-12 ENCOUNTER — HOSPITAL ENCOUNTER (OUTPATIENT)
Dept: MRI IMAGING | Facility: CLINIC | Age: 35
Discharge: HOME | End: 2025-02-12
Attending: STUDENT IN AN ORGANIZED HEALTH CARE EDUCATION/TRAINING PROGRAM
Payer: COMMERCIAL

## 2025-02-12 DIAGNOSIS — Z82.49 FAMILY HISTORY OF BRAIN ANEURYSM: ICD-10-CM

## 2025-02-12 PROCEDURE — 70544 MR ANGIOGRAPHY HEAD W/O DYE: CPT

## 2025-02-12 PROCEDURE — 70544 MR ANGIOGRAPHY HEAD W/O DYE: CPT | Mod: 26 | Performed by: STUDENT IN AN ORGANIZED HEALTH CARE EDUCATION/TRAINING PROGRAM

## 2025-03-24 ENCOUNTER — PATIENT OUTREACH (OUTPATIENT)
Dept: CARE COORDINATION | Facility: CLINIC | Age: 35
End: 2025-03-24
Payer: COMMERCIAL

## 2025-05-05 ENCOUNTER — OFFICE VISIT (OUTPATIENT)
Dept: PEDIATRICS | Facility: CLINIC | Age: 35
End: 2025-05-05
Payer: COMMERCIAL

## 2025-05-05 VITALS
HEIGHT: 63 IN | HEART RATE: 76 BPM | SYSTOLIC BLOOD PRESSURE: 128 MMHG | BODY MASS INDEX: 40.49 KG/M2 | DIASTOLIC BLOOD PRESSURE: 83 MMHG | OXYGEN SATURATION: 100 % | RESPIRATION RATE: 18 BRPM | TEMPERATURE: 97.7 F | WEIGHT: 228.5 LBS

## 2025-05-05 DIAGNOSIS — I10 BENIGN ESSENTIAL HYPERTENSION: Primary | ICD-10-CM

## 2025-05-05 DIAGNOSIS — K76.0 METABOLIC DYSFUNCTION-ASSOCIATED STEATOTIC LIVER DISEASE (MASLD): ICD-10-CM

## 2025-05-05 PROCEDURE — 3074F SYST BP LT 130 MM HG: CPT | Performed by: STUDENT IN AN ORGANIZED HEALTH CARE EDUCATION/TRAINING PROGRAM

## 2025-05-05 PROCEDURE — 3079F DIAST BP 80-89 MM HG: CPT | Performed by: STUDENT IN AN ORGANIZED HEALTH CARE EDUCATION/TRAINING PROGRAM

## 2025-05-05 PROCEDURE — G2211 COMPLEX E/M VISIT ADD ON: HCPCS | Performed by: STUDENT IN AN ORGANIZED HEALTH CARE EDUCATION/TRAINING PROGRAM

## 2025-05-05 PROCEDURE — 99214 OFFICE O/P EST MOD 30 MIN: CPT | Performed by: STUDENT IN AN ORGANIZED HEALTH CARE EDUCATION/TRAINING PROGRAM

## 2025-05-05 PROCEDURE — 1126F AMNT PAIN NOTED NONE PRSNT: CPT | Performed by: STUDENT IN AN ORGANIZED HEALTH CARE EDUCATION/TRAINING PROGRAM

## 2025-05-05 RX ORDER — PHENTERMINE HYDROCHLORIDE 37.5 MG/1
37.5 TABLET ORAL
Qty: 90 TABLET | Refills: 1 | Status: SHIPPED | OUTPATIENT
Start: 2025-05-05

## 2025-05-05 ASSESSMENT — PAIN SCALES - GENERAL: PAINLEVEL_OUTOF10: NO PAIN (0)

## 2025-05-05 NOTE — PROGRESS NOTES
"  Assessment & Plan     Benign essential hypertension  Blood pressure well controlled on losartan (Cozaar) 25mg. Continue.    BMI 40.0-44.9, adult (H)  Wegovy was not helpful. Phentermine was initially helpful for first few weeks but patient has experienced a plateau. Will plan to increase to 37.5mg tablets. Discussed GoodRx coupon in insurance does not cover. Follow up at annual physical.  Discussed selective serotonin reuptake inhibitor and possible negative side effects of decrease in libido - hesitate to switch to Wellbutrin (bupropion) at this time while increasing phentermine.  - phentermine (ADIPEX-P) 37.5 MG tablet; Take 1 tablet (37.5 mg) by mouth every morning (before breakfast).    Metabolic dysfunction-associated steatotic liver disease (MASLD)  Liver enzymes normal on last check. Repeat at follow up appointment.        Follow up  -blood pressure check  -sertraline (Zoloft): switch to Wellbutrin (bupropion)?  -liver enzymes      The longitudinal plan of care for the diagnosis(es)/condition(s) as documented were addressed during this visit. Due to the added complexity in care, I will continue to support Liyah in the subsequent management and with ongoing continuity of care.    BMI  Estimated body mass index is 40.48 kg/m  as calculated from the following:    Height as of this encounter: 1.6 m (5' 3\").    Weight as of this encounter: 103.6 kg (228 lb 8 oz).             Subjective   Liyah is a 34 year old, presenting for the following health issues:  Hypertension and A.D.H.D        5/5/2025     9:46 AM   Additional Questions   Roomed by Angela Henry   Accompanied by N/A     History of Present Illness       Reason for visit:  Follow up    She eats 0-1 servings of fruits and vegetables daily.She consumes 0 sweetened beverage(s) daily.She exercises with enough effort to increase her heart rate 30 to 60 minutes per day.  She exercises with enough effort to increase her heart rate 4 days per week. She is " "missing 1 dose(s) of medications per week.  She is not taking prescribed medications regularly due to remembering to take.      Wt Readings from Last 4 Encounters:   05/05/25 103.6 kg (228 lb 8 oz)   01/21/25 106.7 kg (235 lb 3.2 oz)   12/17/24 102.1 kg (225 lb)   10/07/24 103.4 kg (228 lb)     Phentermine worked well initially (better than Wegovy)  -feels like food noise is reduced  -portion sizes: actually feeling full earlier than previous  -does feel like benefits have plateuaed  -energy levels improved  Denies negative side effects:   Works night shift so changes times takes medication sometimes                    Objective    /83 (BP Location: Right arm, Patient Position: Sitting, Cuff Size: Adult Large)   Pulse 76   Temp 97.7  F (36.5  C) (Temporal)   Resp 18   Ht 1.6 m (5' 3\")   Wt 103.6 kg (228 lb 8 oz)   LMP  (LMP Unknown)   SpO2 100%   BMI 40.48 kg/m    Body mass index is 40.48 kg/m .  Physical Exam   GEN: Alert and appropriately interactive for age  EYES: Eyes grossly normal to inspection, conjunctivae and sclerae normal  RESP: Breathing comfortably on room air   CV: Warm and well perfused peripheral extremities   MS: no gross musculoskeletal defects noted, no edema  NEURO: Alert and oriented. Gait normal. Speech fluent.    PSYCH: Affect normal/bright. Appearance well groomed.               Signed Electronically by: Deirdre E. Milligan, MD    "

## 2025-05-19 ENCOUNTER — VIRTUAL VISIT (OUTPATIENT)
Facility: CLINIC | Age: 35
End: 2025-05-19
Payer: COMMERCIAL

## 2025-05-19 DIAGNOSIS — F41.1 GENERALIZED ANXIETY DISORDER: Primary | ICD-10-CM

## 2025-05-19 DIAGNOSIS — Z13.39 ATTENTION DEFICIT HYPERACTIVITY DISORDER (ADHD) EVALUATION: ICD-10-CM

## 2025-05-19 DIAGNOSIS — F33.41 MAJOR DEPRESSIVE DISORDER, RECURRENT EPISODE, IN PARTIAL REMISSION WITH ANXIOUS DISTRESS: ICD-10-CM

## 2025-05-19 PROCEDURE — 90834 PSYTX W PT 45 MINUTES: CPT | Mod: 95 | Performed by: PSYCHOLOGIST

## 2025-05-19 ASSESSMENT — ANXIETY QUESTIONNAIRES
2. NOT BEING ABLE TO STOP OR CONTROL WORRYING: NEARLY EVERY DAY
GAD7 TOTAL SCORE: 18
IF YOU CHECKED OFF ANY PROBLEMS ON THIS QUESTIONNAIRE, HOW DIFFICULT HAVE THESE PROBLEMS MADE IT FOR YOU TO DO YOUR WORK, TAKE CARE OF THINGS AT HOME, OR GET ALONG WITH OTHER PEOPLE: SOMEWHAT DIFFICULT
7. FEELING AFRAID AS IF SOMETHING AWFUL MIGHT HAPPEN: SEVERAL DAYS
GAD7 TOTAL SCORE: 18
1. FEELING NERVOUS, ANXIOUS, OR ON EDGE: MORE THAN HALF THE DAYS
6. BECOMING EASILY ANNOYED OR IRRITABLE: NEARLY EVERY DAY
5. BEING SO RESTLESS THAT IT IS HARD TO SIT STILL: NEARLY EVERY DAY
3. WORRYING TOO MUCH ABOUT DIFFERENT THINGS: NEARLY EVERY DAY

## 2025-05-19 ASSESSMENT — PATIENT HEALTH QUESTIONNAIRE - PHQ9
5. POOR APPETITE OR OVEREATING: NEARLY EVERY DAY
SUM OF ALL RESPONSES TO PHQ QUESTIONS 1-9: 10
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
## 2025-05-19 NOTE — Clinical Note
Thank you for the referral.  I saw the patient today for the first appointment in the ADHD Evaluation and she was diagnosed with depression and anxiety. I plan to assess for ADHD and route the final diagnoses and recommendations to you when the evaluation is completed. However, since she was recently assessed for ADHD, we will not be able to repeat some of the tests. I plan to review her previous ADHD Assessment and decide how best to move forward. If we do decide to move forward, can she abstain from the Phentermine for 4 consecutive days, so her cognitive testing results are not artificially elevated?   Thank you and please let me know if you have any questions.   Juliet Muniz PsyD LP

## 2025-06-02 ENCOUNTER — VIRTUAL VISIT (OUTPATIENT)
Facility: CLINIC | Age: 35
End: 2025-06-02
Payer: COMMERCIAL

## 2025-06-02 ENCOUNTER — FCC EXTENDED DOCUMENTATION (OUTPATIENT)
Facility: CLINIC | Age: 35
End: 2025-06-02
Payer: COMMERCIAL

## 2025-06-02 DIAGNOSIS — F33.41 MAJOR DEPRESSIVE DISORDER, RECURRENT EPISODE, IN PARTIAL REMISSION WITH ANXIOUS DISTRESS: ICD-10-CM

## 2025-06-02 DIAGNOSIS — F41.1 GENERALIZED ANXIETY DISORDER: Primary | ICD-10-CM

## 2025-06-02 DIAGNOSIS — Z13.39 ATTENTION DEFICIT HYPERACTIVITY DISORDER (ADHD) EVALUATION: ICD-10-CM

## 2025-06-02 PROCEDURE — 90791 PSYCH DIAGNOSTIC EVALUATION: CPT | Mod: 95 | Performed by: PSYCHOLOGIST

## 2025-06-02 ASSESSMENT — ANXIETY QUESTIONNAIRES
3. WORRYING TOO MUCH ABOUT DIFFERENT THINGS: NEARLY EVERY DAY
7. FEELING AFRAID AS IF SOMETHING AWFUL MIGHT HAPPEN: NOT AT ALL
6. BECOMING EASILY ANNOYED OR IRRITABLE: SEVERAL DAYS
IF YOU CHECKED OFF ANY PROBLEMS ON THIS QUESTIONNAIRE, HOW DIFFICULT HAVE THESE PROBLEMS MADE IT FOR YOU TO DO YOUR WORK, TAKE CARE OF THINGS AT HOME, OR GET ALONG WITH OTHER PEOPLE: SOMEWHAT DIFFICULT
4. TROUBLE RELAXING: NEARLY EVERY DAY
GAD7 TOTAL SCORE: 13
8. IF YOU CHECKED OFF ANY PROBLEMS, HOW DIFFICULT HAVE THESE MADE IT FOR YOU TO DO YOUR WORK, TAKE CARE OF THINGS AT HOME, OR GET ALONG WITH OTHER PEOPLE?: SOMEWHAT DIFFICULT
7. FEELING AFRAID AS IF SOMETHING AWFUL MIGHT HAPPEN: NOT AT ALL
1. FEELING NERVOUS, ANXIOUS, OR ON EDGE: MORE THAN HALF THE DAYS
5. BEING SO RESTLESS THAT IT IS HARD TO SIT STILL: MORE THAN HALF THE DAYS
GAD7 TOTAL SCORE: 13
2. NOT BEING ABLE TO STOP OR CONTROL WORRYING: MORE THAN HALF THE DAYS
GAD7 TOTAL SCORE: 13

## 2025-06-02 NOTE — PROGRESS NOTES
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  C) The episode is not attributable to the physiological effects of a substance or to another medical condition  D) The occurence of major depressive episode is not better explained by other thought / psychotic disorders  E) There has never been a manic episode or hypomanic episode                  Functional Status:  Patient reports the following functional impairments:  chronic disease management, health maintenance, home life with , management of the household and or completion of tasks, money management, organization, self-care, and work / vocational responsibilities.     Nonprogrammatic care:  Patient is requesting basic services to address current mental health concerns.    Clinical Summary:  1. Psychosocial Factors:  Occupational Issues.  Cultural and Contextual Factors: works night shift  2. Principal DSM5 Diagnoses  (Sustained by DSM5 Criteria Listed Above):    300.02 (F41.1) Generalized Anxiety Disorder.    296.35 (F33.41)  Major Depressive Disorder, Recurrent Episode, In partial remission With anxious distress .  3. Other Diagnoses that is relevant to services:   RO ADHD.  4. Provisional Diagnosis:   NA  5. Prognosis: Expect Improvement if symptom are treated.  6. Likely consequences of symptoms if not treated: Symptoms likely to persist and may worsen if not treated.  7. Patient strengths include:  educated, empathetic, employed, has a previous history of therapy, insightful, and motivated .     Recommendations:     1. Plan for Safety and Risk Management:   Safety and Risk: Recommended that patient call 911 or go to the local ED should there be a change in any of these risk factors        Report to child / adult protection services was NA.     2. Patient's identified  no concerns relevant to the testing process.    3. Initial Treatment will focus on:    ADHD Testing:  Patient was given self and collaborative rating scales to be completed prior to the next appointment.  Depression and anxiety rating scales were completed.  Copies of previous report cards and previous diagnostic assessment were requested. .     4. Resources/Service Plan:    services are not indicated.   Modifications to assist communication are not indicated.   Additional disability accommodations are not indicated.      5. Collaboration:   Collaboration / coordination of treatment will be initiated with the following  support professionals: primary care physician.      6.  Referrals:   The following referral(s) will be initiated: NA.       A Release of Information has been obtained for the following: outpatient therapist.     Clinical Substantiation/medical necessity for the above recommendations:  Psychological testing is required for diagnostic clarification. Clinical interview was not sufficient to determine if Patient meets criteria for ADHD.      7. ABHI:    ABHI:  Discussed the general effects of drugs and alcohol on health and well-being. Provider gave patient printed information about the  effects of chemical use on their health and well being. Recommendations:  Encouraged patient to decrease caffeine intake to see if that helps  to decrease her anxiety .     8. Records:   These were reviewed at time of assessment.   Information in this assessment was obtained from the medical record and  provided by patient who is a good historian.    Patient will have open access to their mental health medical record.    9.   Interactive Complexity: No    10. Safety Plan: No Safety plan indicated    Provider Name/ Credentials:  Juliet Muniz PsyD LP June 2, 2025

## 2025-06-02 NOTE — PROGRESS NOTES
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 attributable to the physiological effects of a substance or to another medical condition  D) The occurence of major depressive episode is not better explained by other thought / psychotic disorders  E) There has never been a manic episode or hypomanic episode                  Functional Status:  Patient reports the following functional impairments:  chronic disease management, health maintenance, home life with , management of the household and or completion of tasks, money management, organization, self-care, and work / vocational responsibilities.     Nonprogrammatic care:  Patient is requesting basic services to address current mental health concerns.    Clinical Summary:  1. Psychosocial Factors:  Occupational Issues.  Cultural and Contextual Factors: works night shift  2. Principal DSM5 Diagnoses  (Sustained by DSM5 Criteria Listed Above):   296.35 (F33.41)  Major Depressive Disorder, Recurrent Episode, In partial remission With anxious distress .  3. Other Diagnoses that is relevant to services:   RO ADHD.  4. Provisional Diagnosis:  NA  5. Prognosis: Expect Improvement if symptom  are treated.  6. Likely consequences of symptoms if not treated: Symptoms likely to persist and may worsen if not treated.  7. Patient strengths include:  educated, empathetic, employed, has a previous history of therapy, insightful, and motivated .     Recommendations:     1. Plan for Safety and Risk Management:   Safety and Risk: Recommended that patient call 911 or go to the local ED should there be a change in any of these risk factors        Report to child / adult protection services was NA.     2. Patient's identified   no concerns relevant to the testing process.    3. Initial Treatment will focus on:    ADHD Testing:  Patient was given self and collaborative rating scales to be completed prior to the next appointment.  Depression and anxiety rating scales were completed.  Copies of previous report cards and previous diagnostic assessment were requested. .     4. Resources/Service Plan:    services are not indicated.   Modifications to assist communication are not indicated.   Additional disability accommodations are not indicated.      5. Collaboration:   Collaboration / coordination of treatment will be initiated with the following  support professionals: primary care physician.      6.  Referrals:   The following referral(s) will be initiated: NA.       A Release of Information has been obtained for the following: outpatient therapist.     Clinical Substantiation/medical necessity for the above recommendations:  Psychological testing is required for diagnostic clarification. Clinical interview was not sufficient to determine if Patient meets criteria for ADHD.      7. ABHI:    ABHI:  Discussed the general effects of drugs and alcohol on health and well-being. Provider gave patient printed information about the  effects of chemical use on their health and well being. Recommendations:  Encouraged patient to decrease caffeine intake to see if that helps to decrease her anxiety .     8.  Records:   These were reviewed at time of assessment.   Information in this assessment was obtained from the medical record and  provided by patient who is a good historian.    Patient will have open access to their mental health medical record.    9.   Interactive Complexity: No    10. Safety Plan: No Safety plan indicated    Provider Name/ Credentials:  Juliet Muniz PsyD LP June 2, 2025

## 2025-06-05 ENCOUNTER — PATIENT OUTREACH (OUTPATIENT)
Dept: CARE COORDINATION | Facility: CLINIC | Age: 35
End: 2025-06-05
Payer: COMMERCIAL

## 2025-06-11 ENCOUNTER — DOCUMENTATION ONLY (OUTPATIENT)
Facility: CLINIC | Age: 35
End: 2025-06-11

## 2025-06-11 ENCOUNTER — VIRTUAL VISIT (OUTPATIENT)
Dept: PSYCHOLOGY | Facility: CLINIC | Age: 35
End: 2025-06-11
Payer: COMMERCIAL

## 2025-06-11 ENCOUNTER — DOCUMENTATION ONLY (OUTPATIENT)
Facility: CLINIC | Age: 35
End: 2025-06-11
Payer: COMMERCIAL

## 2025-06-11 DIAGNOSIS — F41.1 GENERALIZED ANXIETY DISORDER: Primary | ICD-10-CM

## 2025-06-11 DIAGNOSIS — F33.41 MAJOR DEPRESSIVE DISORDER, RECURRENT EPISODE, IN PARTIAL REMISSION WITH ANXIOUS DISTRESS: ICD-10-CM

## 2025-06-11 DIAGNOSIS — Z13.39 ATTENTION DEFICIT HYPERACTIVITY DISORDER (ADHD) EVALUATION: ICD-10-CM

## 2025-06-11 PROCEDURE — 90834 PSYTX W PT 45 MINUTES: CPT | Mod: 95 | Performed by: PSYCHOLOGIST

## 2025-06-11 NOTE — PROGRESS NOTES
"[facility]  ADHD Evaluation         Patient: Liyah Rodriguez   YOB: 1990  MRN: [id number]  Date(s) of assessment:  Anushka self-report and collateral measures scored and interpreted 6/11/2025         Assessment tools:      Anushka Adult ADHD Rating Scale-IV: Self and Other Reports (BAARS-IV), Anushka Functional Impairment Scale: Self and Other Reports (BFIS), and Anushka Deficits in Executive Functioning Scale: Self and Other Reports (BDEFS)    ADHD Assessment tools have been completed remotely as in person observations were not possible.    Assessment Results:        Anushka Adult ADHD Rating Scale-IV: Self and Other Reports (BAARS-IV)  The BAARS-IV assesses for symptoms of ADHD that are experienced in one's daily life. This assessment measure includes self and collateral rating scales designed to provide information regarding current and childhood symptoms of ADHD including inattention, hyperactivity, and impulsivity. Self-report scores are reported as percentiles. Scores at the 76th-83rd percentile are considered marginal, scores at the 84th-92nd percentile are considered borderline, scores at the 93rd-95th percentile are considered mild, scores at the 96th-98th percentile are considered moderate, and those at the 99th percentile are considered severe. Collateral or \"other\" rating scales are reported as number of symptoms observed in comparison to those reported by the client. Norms and percentile scores are not available for collateral reports.     Current Symptoms Scale--Self Report:   Client completed the self-report inventory of current symptoms. The results indicate that the client's Total ADHD Score was 46 which places her in the 97th percentile for overall ADHD symptoms. In addition, the client endorsed 7/9 (98th percentile) Inattention symptoms, 3/9 (93rd percentile) Hyperactivity-Impulsivity symptoms, and 4/9 (93rd percentile) Sluggish Cognitive Tempo " symptoms. Client indicated that the reported symptoms have resulted in impaired functioning in home and work. Overall, the results suggest the client is experiencing Moderate ADHD symptoms.     Current Symptoms Scale--Other Report:  Client's  completed the collateral report inventory of current symptoms. Based on the collateral contact's observation of symptoms, the client demonstrates 1/9 Inattention symptoms, 0/5 Hyperactivity, 0/4 Impulsivity symptoms, and 0/9 Sluggish Cognitive Tempo symptoms. The client's Total ADHD Score was 24. The collateral contact indicated the client demonstrates impaired functioning in home and social relationships. The collateral- and self-report scores are significantly different for Inattention, Hyperactivity, and Total ADHD, and are within normal limits for Impulsivity and Sluggish Cognitive Tempo.     Childhood Symptoms Scale--Self-Report:  Client completed the self-report inventory of childhood symptoms. The results indicate that the client's Total ADHD Score was 41 which places her in the 88th percentile for overall ADHD symptoms in childhood. In addition, the client endorsed 8/9 (98th percentile) Inattention symptoms and  1/9 (78th percentile) Hyperactivity-Impulsivity symptoms. Client indicated that the reported symptoms resulted in impaired functioning in school and home. Overall, the results suggest the client experienced  Borderline symptoms of ADHD as a child.     Childhood Symptoms Scale--Other Report:  Client's mother completed the collateral report inventory of childhood symptoms. Based on the collateral contact's recollection of client's childhood symptoms, the client demonstrated 0/9 Inattention symptoms and 0/9 Hyperactivity-Impulsivity symptoms. The client's Total ADHD Score was 21. The collateral contact indicated the client demonstrates impaired functioning in no areas. The collateral- and self-report scores are significantly different.                         "   Anushka Functional Impairment Scale: Self and Other Reports (BFIS)  The BFIS is used to assess an individuals' psychosocial impairment in major life/daily activities that may be due to a mental health disorder. This assessment measure includes self and collateral rating scales. Self-report scores are reported as percentiles. Scores at the 76th-83rd percentile are considered marginal, scores at the 84th-92nd percentile are considered borderline, scores at the 93rd-95th percentile are considered mild, scores at the 96th-98th percentile are considered moderate, and those at the 99th percentile are considered severe.Collateral or \"other\" rating scales are reported as number of symptoms observed in comparison to those reported by the client. Norms and percentile scores are not available for collateral reports.     Results indicate the client identified impairment (scores at or greater than 93rd percentile) in the following areas: home-chores and daily responsibilities. The client's Mean Impairment Score was 2.93 (51st-75th percentile) indicating the client is reporting Marginal impairment in functioning across domains. Client's  completed the collateral rating scale, which indicated similar results. The collateral contact's scores were generally lower than the client's report.     Anushka Deficits in Executive Functioning Scale (BDEFS)  The BDEFS is a measure used for evaluating dimensions of adult executive functioning in daily life.This assessment measure includes self and collateral rating scales. Self-report scores are reported as percentiles. Scores at the 76th-83rd percentile are considered marginal, scores at the 84th-92nd percentile are considered borderline, scores at the 93rd-95th percentile are considered mild, scores at the 96th-98th percentile are considered moderate, and those at the 99th percentile are considered severe.Collateral or \"other\" rating scales are reported as number of symptoms " observed in comparison to those reported by the client. Norms and percentile scores are not available for collateral reports.     Results indicate the client's Total Executive Functioning Score was 171  (79th percentile). The ADHD-Executive Functioning Index score was 20 (51st-75th percentile). These scores suggest the client has Marginal deficits in executive functioning. These deficits may be due to ADHD. Results indicate the client identified significant deficits in the following areas: self-motivation 94th. Client's  completed the collateral rating scale, which indicated similar results. The collateral contact's scores were generally lower than the client's report.    Next Step: Administer WAIS.      Juliet Muniz Psy.D, LP    6/11/2025

## 2025-06-11 NOTE — PROGRESS NOTES
CNS Vital Signs Neurocognitive Battery  The CNS Vital Signs Neurocognitive Battery is a remotely-administered assessment comprised of seven core subtests to individually measure the patient's verbal memory, visual memory, motor speed, psychomotor speed, reaction time, focus, ability to sustain attention and ability to adapt to changing rules and tasks.      Above average domain scores indicate a standard score (SS) greater than 109 or a Percentile Rank (IN) greater than 74, indicating a high functioning test subject. Average is a SS  or IN 25-74, indicating normal function. Low Average is a SS 80-89 or IN 9-24 indicating a slight deficit or impairment. Below Average is a SS 70-79 or IN 2-8, indicating a moderate level of deficit or impairment. Very Low is a SS less than 70 or a IN less than 2, indicating a deficit and impairment.  Validity Indicator denotes a guideline for representing the possibility of an invalid test or domain score, and can be influenced by patient understanding, effort, or other conditions.    The patient's results are detailed below:    Domain Standard Score Percentile Description Validity   Neurocognitive Index 97 42 Average YES   Composite Memory Measure 117 87 Above YES   Verbal Memory 116 86 Above YES   Visual Memory 113 81 Above YES   Psychomotor Speed 97 42 Average YES   Reaction Time 89 23 Low Average YES   *Complex Attention 94 34 Average YES   *Cognitive Flexibility 88 21 Low Average YES   *Processing Speed 117 87 Above YES   *Executive Function 94 34 Average YES   *Simple Attention 91 27 Average YES   Motor Speed 88 21 Low Average YES   *Scales that are the most indicative of ADHD    Neurocognitive Index (NCI): Measures an average score derived from the domain scores or a general assessment of the overall neurocognitive status of the patient. The patient's NCI score is 97, with a percentile of 42, and falls within the Average range.    Composite Memory: Measures how well  subject can recognize, remember, and retrieve words and geometric figures, and is comprised of the Visual and Verbal Memory domains. The patient's Composite Memory score is 117, with a percentile of 87, and falls within the Above range.    Verbal Memory: Measures how well subject can recognize, remember, and retrieve words. The patient's Verbal Memory score is 116, with a percentile of 87, and falls within the Above range.    Visual Memory: Measures how well subject can recognize, remember and retrieve geometric figures. The patient's Visual Memory score is 113, with a percentile of 81, and falls within the Above range.    Psychomotor Speed: Measures how well a subject perceives, attends, responds to complex visual-perceptual information and performs simple fine motor coordination, and is comprised of the Motor Speed and Processing Speed indexes. The patient's Psychomotor Speed score is 97, with a percentile of 42, and falls within the Average range.    Reaction Time: Measures how quickly the subject can react, in milliseconds, to a simple and increasingly complex direction set. The patient's Reaction Time score is 89, with a percentile of 23, and falls within the Low Average range.    Complex Attention: Measures the ability to track and respond to a variety of stimuli over lengthy periods of time and/or perform complex mental tasks requiring vigilance quickly and accurately. The patient's Complex Attention score is 94, with a percentile of 34, and falls within the Average range.    Cognitive Flexibility: Measures how well subject is able to adapt to rapidly changing and increasingly complex set of directions and/or to manipulate the information. The patient's Cognitive Flexibility score is 88, with a percentile of 21, and falls within the Low Average range.    Processing Speed: Measures how well a subject recognizes and processes information i.e., perceiving, attending/responding to incoming information, motor  speed, fine motor coordination, and visual-perceptual ability. The patient's Processing Speed score is 117, with a percentile of 87, and falls within the Above range.    Executive Function: Measures how well a subject recognizes rules, categories, and manages or navigates rapid decision making. The patient's Executive Function score is 94, with a percentile of 34, and falls within the Average range.    Simple Attention: Measures the ability to track and respond to a single defined stimulus over lengthy periods of time while performing vigilance and response inhibition quickly and accurately to a simple task. The patient's Simple Attention score is 91, with a percentile of 27, and falls within the Average range.    Motor Speed: Measure: Ability to perform simple movements to produce and satisfy an intention towards a manual action and goal. The patient's Motor Speed score is 88, with a percentile of 21, and falls within the Low Average range.      Next Step: Interpret [facility] questionnaires.     Juliet Muniz Psy.D, LP  6/11/2025

## 2025-06-11 NOTE — PROGRESS NOTES
[facility]                                                   Progress Note    Patient Name: Liyah Rodriguez   Date: 6/11/2025          Service Type: Individual      Session Start Time: 8:00AM  Session End Time: 8:42AM     Session Length: 42 minutes    Session #: ADHD 3    Attendees: Client attended alone    Service Modality:  Video Visit:      Provider verified identity through the following two step process.  Patient provided:  Patient is known previously to provider    Telemedicine Visit: The patient's condition can be safely assessed and treated via synchronous audio and visual telemedicine encounter.      Reason for Telemedicine Visit: Patient convenience (e.g. access to timely appointments / distance to available provider)    Originating Site (Patient Location): Patient's other parked in her car at a park    Distant Site (Provider Location): Provider Remote Setting- Home Office    Consent:  The patient/guardian has verbally consented to: the potential risks and benefits of telemedicine (video visit) versus in person care; bill my insurance or make self-payment for services provided; and responsibility for payment of non-covered services.     Patient would like the video invitation sent by:  My Chart    Mode of Communication:  Video Conference via [facility]    Distant Location (Provider):  Off-site    As the provider I attest to compliance with applicable laws and regulations related to telemedicine.   Provider: Off site      DATA  Interactive Complexity: No  Crisis: No       Progress Since Last Session (Related to Symptoms / Goals / Homework):   Symptoms: No change for task completion and feeling overwhelmed     Homework: Achieved / completed to satisfaction      Episode of Care Goals: Satisfactory progress - ACTION (Actively working towards change); Intervened by reinforcing change plan / affirming steps taken     Current / Ongoing Stressors and Concerns:   Work Related  Stress  Health concerns    Treatment Objective(s) Addressed in This Session:       Gathered History of ADHD Symptoms          History of Presenting Concern:  Patient reported that she has completed a previous ADHD diagnostic assessment at the age of 34. Patient reported that she was not diagnosed with ADHD, she was diagnosed with anxiety.       Patient reported that her current triggers for anxiety include difficulty with completing chores and de-cluttering her home. Patient reported that she feels overwhelmed by the physical clutter and she also feels anxious about her task list not being completed. Patient reported that her anxiety and difficulty with completing tasks exacerbated when she had a child. Discussed how patient's stressors could be related to her phase of life. For example, difficulty staying on top of chores, while raising a child and working full time.       Social History:    As a child, Patient reported having sleep disturbance, including: insomnia . Patient reported currently experiencing regular and consistent sleep patterns.  Client reported sleeping approximately 6-8 hours per night. Patient reported that her sleep schedule shifts due to working night shifts. Patient  reported that she has completed a sleep study. Patient reported that she was diagnosed with sleep apnea and she uses a c-pap machine when she sleeps. Patient reported that she decreased her episodes from 60 to 1.5 per hour. Patient reported that since she started using the c-pap machine, she feels less fatigued. However, she has not observed a change in her cognition, and becoming distracted while speaking. Patient reported having an inconsistent diet, cravings for sweets, and frequent meals from fast food restaurants.  Patient reported that she will impulsively eat treats if they are available.   Patient reported no current exercise routine.       Patient graduated high school in 2008 with a 3.8 GPA. During the elementary,  "middle, and high school years, patient recalls academic strengths in the area of language and social studies. Patient reported experiencing mild academic problems in math. Patient reported that she felt bored in march and did not enjoy the subject.  Patient did attend post-secondary school.      Patient reported that she attended the [employer] in the fall of 2008. Patient reported that she lived on campus. Patient reported that she attended classes and was on time. Patient reported that she \"needed to learn how to study.\" Patient reported that she earned Bs and Cs and she was used to earning As in high school. Patient reported that she did not know how to study. Patient reported that she typically procrastinated with completing homework assignments. Patient reported that she typically began writing a paper 1-2 days before it was due and began studying for a test the night before the exam. Patient reported that he did not need to ask for deadline extensions.     Patient reported that if they went to the grocery store to purchase three items, they would not experience difficulty purchasing the three items if they were not written down. However, Patient reported that she typically forgets one item on her grocery list each time she goes to the store.     Patient reported that they is currently employed. Client reported that the current job is a good fit for her skills and personality.  Client reported that she frequently made mistakes with poor attention to detail, disorganized behavior, distractible behavior, and problems learning new materials .        Intervention:  CBT: socratic questioning, normalizing   Emotion Focused Therapy: emotion checking  Motivational Interviewing: open ended questions    Assessments completed prior to visit:  The following assessments were completed by patient for this visit:  PHQ9:       11/23/2020     9:32 AM 12/6/2020     8:12 PM 12/21/2020    10:15 AM 12/30/2021     9:45 PM 7/14/2022     " 9:31 AM 2/27/2023    10:39 AM 5/19/2025     9:18 AM   PHQ-9 SCORE   PHQ-9 Total Score MyChart 10 (Moderate depression) 5 (Mild depression) 4 (Minimal depression) 7 (Mild depression) 8 (Mild depression) 6 (Mild depression)    PHQ-9 Total Score 10 5 4 7 8 6 10     GAD7:       12/6/2020     8:14 PM 12/21/2020    10:14 AM 12/30/2021     4:20 PM 7/14/2022     9:32 AM 2/27/2023    10:36 AM 5/19/2025     9:18 AM 6/2/2025     7:32 AM   LEEANN-7 SCORE   Total Score 8 (mild anxiety) 3 (minimal anxiety) 8 (mild anxiety) 11 (moderate anxiety) 9 (mild anxiety)  13 (moderate anxiety)   Total Score 8 3 8 11 9 18 13        Patient-reported         Mental Status Assessment:  Appearance:   Appropriate   Eye Contact:   Good   Psychomotor Behavior: Normal   Attitude:   Cooperative  Interested  Orientation:   All  Speech   Rate / Production: Normal    Volume:  Normal   Mood:    Euthymic  Affect:    Appropriate   Thought Content:  Clear   Thought Form:  Coherent  Logical   Insight:    Good         Safety Issues and Plan for Safety and Risk Management:    Client denies current fears or concerns for personal safety.  Client denies current or recent suicidal ideation or behaviors.  Client denies current or recent homicidal ideation or behaviors.  Client denies current or recent self injurious behavior or ideation.  Client denies other safety concerns.  Client reports there are firearms in the house. The firearms are secured in a locked space.  Recommended that patient call 911 or go to the local ED should there be a change in any of these risk factors            DSM-5Diagnoses: (Sustained by DSM5 Criteria Listed Above)    1. Generalized anxiety disorder    2. Major depressive disorder, recurrent episode, in partial remission with anxious distress    3. Attention deficit hyperactivity disorder (ADHD) evaluation            Plan  Plan to score and interpret [facility] questionnaires and CNS Vital Signs Neurocognitive Battery, and then contact  patient regarding the next step in the ADHD Assessment.         Juliet Muniz PsyD, LP 6/11/2025

## 2025-06-18 ENCOUNTER — PSYCHE (OUTPATIENT)
Dept: PSYCHOLOGY | Facility: CLINIC | Age: 35
End: 2025-06-18
Payer: COMMERCIAL

## 2025-06-18 DIAGNOSIS — F41.1 GENERALIZED ANXIETY DISORDER: Primary | ICD-10-CM

## 2025-06-18 DIAGNOSIS — Z13.39 ATTENTION DEFICIT HYPERACTIVITY DISORDER (ADHD) EVALUATION: ICD-10-CM

## 2025-06-18 DIAGNOSIS — F33.41 MAJOR DEPRESSIVE DISORDER, RECURRENT EPISODE, IN PARTIAL REMISSION WITH ANXIOUS DISTRESS: ICD-10-CM

## 2025-06-18 NOTE — PROGRESS NOTES
[facility]                                     Progress Note    Patient Name: Liyah Rodriguez  Date: 6/18/2025          Service Type: Individual for ADHD Assessment      Session Start Time: 7:00AM  Session End Time: 8:15AM  7:00-7:05AM Beulah Administration  7:05-8:15AM WAIS IV Administration     Session Length: 75 minutes    Session #: ADHD WAIS    Attendees: Client attended alone    Service Modality:  In-person at the [facility]        DATA  Interactive Complexity: No  Crisis: No        Progress Since Last Session (Related to Symptoms / Goals / Homework):   Symptoms: No change     Homework: Achieved / completed to satisfaction       Episode of Care Goals: Satisfactory progress - ACTION (Actively working towards change); Intervened by reinforcing change plan / affirming steps taken     Current / Ongoing Stressors and Concerns:   Work Related Stress      Treatment Objective(s) Addressed in This Session:   Complete IQ testing as part of ADHD assessment       Patient arrived ambreen and appeared motivated to complete the test. She appeared to exhibit sound effort throughout the testing process. She asked for four questions to be repeated on the Arithmetic subtest.     Beulah Sleepiness Scale:  Self-report measure of how likely the patient is to doze off in eight different situations. Max score is 24. Sufficient sleep is a score of 6 or less. Scores of 7-8 are considered average. A sleep medicine consultation is recommended for scores of 9 or above.  Patient's score = 6, considered not appropriate for a referral to a sleep specialist.      Intervention:   WAIS-IV was administered. Measures were scored and interpreted, and Patient plans to return for feedback appointment.     Assessments completed prior to visit:  The following assessments were completed by patient for this visit:  PHQ9:       12/6/2020     8:12 PM 12/21/2020    10:15 AM 12/30/2021     9:45  PM 7/14/2022     9:31 AM 2/27/2023    10:39 AM 5/19/2025     9:18 AM 6/17/2025     8:02 AM   PHQ-9 SCORE   PHQ-9 Total Score [place] 5 (Mild depression) 4 (Minimal depression) 7 (Mild depression) 8 (Mild depression) 6 (Mild depression)  8 (Mild depression)   PHQ-9 Total Score 5 4 7 8 6 10 8        Patient-reported     GAD7:       12/21/2020    10:14 AM 12/30/2021     4:20 PM 7/14/2022     9:32 AM 2/27/2023    10:36 AM 5/19/2025     9:18 AM 6/2/2025     7:32 AM 6/17/2025     8:03 AM   LEEANN-7 SCORE   Total Score 3 (minimal anxiety) 8 (mild anxiety) 11 (moderate anxiety) 9 (mild anxiety)  13 (moderate anxiety) 14 (moderate anxiety)   Total Score 3 8 11 9 18 13  14        Patient-reported         ASSESSMENT: Current Emotional / Mental Status (status of significant symptoms):   Risk status (Self / Other harm or suicidal ideation)   Patient denies current fears or concerns for personal safety.   Patient denies current or recent suicidal ideation or behaviors.   Patient denies current or recent homicidal ideation or behaviors.   Patient denies current or recent self injurious behavior or ideation.   Patient denies other safety concerns.   Patient reports there has been no change in risk factors since their last session.     Patient reports there has been no change in protective factors since their last session.     Recommended that patient call 911 or go to the local ED should there be a change in any of these risk factors     Appearance:   Appropriate      Eye Contact:   Good    Psychomotor Behavior: Normal    Attitude:   Cooperative  Interested Friendly Pleasant   Orientation:   All   Speech    Rate / Production: Normal/ Responsive Normal     Volume:  Normal    Mood:    Euthymic   Affect:    Appropriate     Thought Content:  Clear    Thought Form:  Coherent  Logical    Insight:    Good      Medication Review:   No changes to current psychiatric medication(s)      Medication Compliance:   Yes      Changes in Health  Issues:   None reported      Chemical Use Review:   Substance Use: Chemical use reviewed, no active concerns identified      Tobacco Use: No current tobacco use.      Diagnosis:  1. Generalized anxiety disorder    2. Major depressive disorder, recurrent episode, in partial remission with anxious distress    3. Attention deficit hyperactivity disorder (ADHD) evaluation          Collateral Reports Completed:   Not Applicable    PLAN: (Patient Tasks / Therapist Tasks / Other)  Plan to score and interpret the WAIS-IV and then contact patient to schedule the feedback appointment.         Claritza Whitman.D, LP   6/18/2025

## 2025-06-23 ENCOUNTER — DOCUMENTATION ONLY (OUTPATIENT)
Facility: CLINIC | Age: 35
End: 2025-06-23
Payer: COMMERCIAL

## 2025-06-23 ENCOUNTER — FCC EXTENDED DOCUMENTATION (OUTPATIENT)
Facility: CLINIC | Age: 35
End: 2025-06-23
Payer: COMMERCIAL

## 2025-06-23 NOTE — PROGRESS NOTES
[facility]  ADHD Evaluation         Patient: Liyah Rodriguez   YOB: 1990  MRN: [id number]  Date(s) of assessment:  WAIS Administered and Scored on 6/18/2025 and report written on 6/23/2025         Assessment tools:      Wechsler Adult Intelligence Scale--Fourth Edition (WAIS-IV)  Trails Making Test (TMT)         Assessment Results:    WAIS-IV Behavioral Observations:  Patient arrived ambreen and appeared motivated to complete the test. She appeared to exhibit sound effort throughout the testing process. She asked for four questions to be repeated on the Arithmetic subtest.       Wechsler Adult Intelligence Scale--Fourth Edition (WAIS-IV)  Client s intellectual functioning was assessed using the WAIS-IV. This measure provides a Full Scale Score, as well as several index scores measuring specific areas of cognitive ability. Index scores are reported using standard scores which have a mean of 100 and a standard deviation of 15. Subtest scores are reported using scaled scores that have a mean of 10 and a standard deviation of 2. Client s scores are reported at the 95 percent confidence interval, which indicates that there is a 95 percent chance that his true score falls within the stated range.  Results indicate that the FSIQ, a measure of overall intelligence, is the best descriptor of the client s functioning.  Client s score on the Verbal Comprehension Index (VCI), a measure of verbal concept formation, verbal reasoning, and acquired knowledge, was in the Superior range (95% chance 120-132; 96th percentile). Client s score on the Perceptual Reasoning Index (JAYA), a measure of perceptual and fluid reasoning, spatial processing, and visual-motor integration, was in the Superior range (95% chance 118-130; 95th percentile). Client scored in the High Average range (95% chance 111-125;  90th percentile) on the Working Memory Index (WMI), a measure of ability to retain information  in memory, perform some operation or manipulation, and produce a result. Client s score on the Processing Speed Index (PSI), a measure of short-term visual memory, attention, and visual-motor coordination was in the Superior range (95% chance 111-128; 93rd percentile).     Summary of WAIS-IV Index Scores   Index  Score Percentile Rank Confidence  Interval* Qualitative Description   Verbal Comprehension Index (VCI) 127 96 120-132 Superior   Perceptual Reasoning Index (JAYA) 125 95 118-130 Superior   Working Memory Index (WMI) 119 90 111-125 High Average   Processing Speed Index (PSI) 122 93 111-128 Superior   Full Scale IQ (FSIQ) 130 98 125-133 Very Superior     Summary of WAIS-IV Scaled Subtest Scores  Verbal Comprehension Perceptual Reasoning   Similarities 14 Block Design 15   Vocabulary 15 Matrix Reasoning 13   Information 15 Visual Puzzles 15   Working Memory Processing Speed   Letter-Number-Sequencing  14 Symbol Search 13   Arithmetic 13 Coding 15   *Confidence intervals at 95th percentile    Please note that the Letter-Number-Sequence subtest was supplemented for the Digit Span subtest due to the patient completing the Digit Span subtest in January of 2025 with another psychologist.       Next Step: Schedule feedback appointment    Juliet Muniz Psy.D, XIOMARA    6/23/2025

## 2025-06-23 NOTE — PROGRESS NOTES
[facility]  ADHD Evaluation        Patient: Liyah Rodriguez   YOB: 1990  MRN: [id number]  Date(s) of assessment:  Diagnostic Assessment 6-2-25, Anushka self-report and collateral measures scored and interpreted 6-11-25, and WAIS 6-18-25  CNS Vital Signs Neurocognitive Battery 6-11-25        Information about appointment:  Client attended three sessions to aid in determining client's mental health diagnosis or diagnoses and treatment recommendations that best address client concerns. Client records including medical and previous evaluation were reviewed. A diagnostic assessment was conducted over the first two appointments. Client completed several rating scales to assist in assessing attention-related and other mental health symptoms that may be causing impairments in functioning. Rating scales were also completed by a collateral contact.    Assessment tools:      Anushka Adult ADHD Rating Scale-IV: Self and Other Reports (BAARS-IV), Anushka Functional Impairment Scale: Self and Other Reports (BFIS), Anushka Deficits in Executive Functioning Scale: Self and Other Reports (BDEFS), Wechsler Adult Intelligence Scale--Fourth Edition (WAIS-IV), Patient Health Questionnaire-9 (PHQ-9), Generalized Anxiety Disorder-7 (LEEANN-7), and CNS Vital Signs Neurocognitive Battery  [place] Sleepiness Scale  PTSD Checklist (PCL-5) SF-20    Some measures may have been completed remotely due to virtual care, in which case observation of task completion was not possible.      Assessment Results:    Behavioral Observations:  The Patient arrived early for all appointments and scheduled follow-up appointments efficiently. The Patient appeared motivated to complete the assessment and was polite in interactions. She was oriented by three. She appeared to experience difficulty with attention and hyperactivity/impulsivity during sessions. The following results are likely to be an accurate reflection  "of Patient's current functioning.     WAIS-IV Behavioral Observations  Patient arrived ambreen and appeared motivated to complete the test. She appeared to exhibit sound effort throughout the testing process. She asked for four questions to be repeated on the Arithmetic subtest.       Anushka Adult ADHD Rating Scale-IV: Self and Other Reports (BAARS-IV)  The BAARS-IV assesses for symptoms of ADHD that are experienced in one's daily life. This assessment measure includes self and collateral rating scales designed to provide information regarding current and childhood symptoms of ADHD including inattention, hyperactivity, and impulsivity. Self-report scores are reported as percentiles. Scores at the 76th-83rd percentile are considered marginal, scores at the 84th-92nd percentile are considered borderline, scores at the 93rd-95th percentile are considered mild, scores at the 96th-98th percentile are considered moderate, and those at the 99th percentile are considered severe. Collateral or \"other\" rating scales are reported as number of symptoms observed in comparison to those reported by the client. Norms and percentile scores are not available for collateral reports.      Current Symptoms Scale--Self Report:   Client completed the self-report inventory of current symptoms. The results indicate that the client's Total ADHD Score was 46 which places her in the 97th percentile for overall ADHD symptoms. In addition, the client endorsed 7/9 (98th percentile) Inattention symptoms, 3/9 (93rd percentile) Hyperactivity-Impulsivity symptoms, and 4/9 (93rd percentile) Sluggish Cognitive Tempo symptoms. Client indicated that the reported symptoms have resulted in impaired functioning in home and work. Overall, the results suggest the client is experiencing Moderate ADHD symptoms.      Current Symptoms Scale--Other Report:  Client's  completed the collateral report inventory of current symptoms. Based on the collateral " contact's observation of symptoms, the client demonstrates 1/9 Inattention symptoms, 0/5 Hyperactivity, 0/4 Impulsivity symptoms, and 0/9 Sluggish Cognitive Tempo symptoms. The client's Total ADHD Score was 24. The collateral contact indicated the client demonstrates impaired functioning in home and social relationships. The collateral- and self-report scores are significantly different for Inattention, Hyperactivity, and Total ADHD, and are within normal limits for Impulsivity and Sluggish Cognitive Tempo.      Childhood Symptoms Scale--Self-Report:  Client completed the self-report inventory of childhood symptoms. The results indicate that the client's Total ADHD Score was 41 which places her in the 88th percentile for overall ADHD symptoms in childhood. In addition, the client endorsed 8/9 (98th percentile) Inattention symptoms and  1/9 (78th percentile) Hyperactivity-Impulsivity symptoms. Client indicated that the reported symptoms resulted in impaired functioning in school and home. Overall, the results suggest the client experienced  Borderline symptoms of ADHD as a child.      Childhood Symptoms Scale--Other Report:  Client's mother completed the collateral report inventory of childhood symptoms. Based on the collateral contact's recollection of client's childhood symptoms, the client demonstrated 0/9 Inattention symptoms and 0/9 Hyperactivity-Impulsivity symptoms. The client's Total ADHD Score was 21. The collateral contact indicated the client demonstrates impaired functioning in no areas. The collateral- and self-report scores are significantly different.                           Anushka Functional Impairment Scale: Self and Other Reports (BFIS)  The BFIS is used to assess an individuals' psychosocial impairment in major life/daily activities that may be due to a mental health disorder. This assessment measure includes self and collateral rating scales. Self-report scores are reported as percentiles.  "Scores at the 76th-83rd percentile are considered marginal, scores at the 84th-92nd percentile are considered borderline, scores at the 93rd-95th percentile are considered mild, scores at the 96th-98th percentile are considered moderate, and those at the 99th percentile are considered severe.Collateral or \"other\" rating scales are reported as number of symptoms observed in comparison to those reported by the client. Norms and percentile scores are not available for collateral reports.      Results indicate the client identified impairment (scores at or greater than 93rd percentile) in the following areas: home-chores and daily responsibilities. The client's Mean Impairment Score was 2.93 (51st-75th percentile) indicating the client is reporting Marginal impairment in functioning across domains. Client's  completed the collateral rating scale, which indicated similar results. The collateral contact's scores were generally lower than the client's report.     Anushka Deficits in Executive Functioning Scale (BDEFS)  The BDEFS is a measure used for evaluating dimensions of adult executive functioning in daily life.This assessment measure includes self and collateral rating scales. Self-report scores are reported as percentiles. Scores at the 76th-83rd percentile are considered marginal, scores at the 84th-92nd percentile are considered borderline, scores at the 93rd-95th percentile are considered mild, scores at the 96th-98th percentile are considered moderate, and those at the 99th percentile are considered severe.Collateral or \"other\" rating scales are reported as number of symptoms observed in comparison to those reported by the client. Norms and percentile scores are not available for collateral reports.      Results indicate the client's Total Executive Functioning Score was 171  (79th percentile). The ADHD-Executive Functioning Index score was 20 (51st-75th percentile). These scores suggest the client has " Marginal deficits in executive functioning. These deficits may be due to ADHD. Results indicate the client identified significant deficits in the following areas: self-motivation 94th. Client's  completed the collateral rating scale, which indicated similar results. The collateral contact's scores were generally lower than the client's report.      Wechsler Adult Intelligence Scale--Fourth Edition (WAIS-IV)  Client s intellectual functioning was assessed using the WAIS-IV. This measure provides a Full Scale Score, as well as several index scores measuring specific areas of cognitive ability. Index scores are reported using standard scores which have a mean of 100 and a standard deviation of 15. Subtest scores are reported using scaled scores that have a mean of 10 and a standard deviation of 2. Client s scores are reported at the 95 percent confidence interval, which indicates that there is a 95 percent chance that his true score falls within the stated range.  Results indicate that the FSIQ, a measure of overall intelligence, is the best descriptor of the client s functioning.  Client s score on the Verbal Comprehension Index (VCI), a measure of verbal concept formation, verbal reasoning, and acquired knowledge, was in the Superior range (95% chance 120-132; 96th percentile). Client s score on the Perceptual Reasoning Index (JAYA), a measure of perceptual and fluid reasoning, spatial processing, and visual-motor integration, was in the Superior range (95% chance 118-130; 95th percentile). Client scored in the High Average range (95% chance 111-125;  90th percentile) on the Working Memory Index (WMI), a measure of ability to retain information in memory, perform some operation or manipulation, and produce a result. Client s score on the Processing Speed Index (PSI), a measure of short-term visual memory, attention, and visual-motor coordination was in the Superior range (95% chance 111-128; 93rd percentile).       Summary of WAIS-IV Index Scores    Index  Score Percentile Rank Confidence  Interval* Qualitative Description   Verbal Comprehension Index (VCI) 127 96 120-132 Superior   Perceptual Reasoning Index (JAYA) 125 95 118-130 Superior   Working Memory Index (WMI) 119 90 111-125 High Average   Processing Speed Index (PSI) 122 93 111-128 Superior   Full Scale IQ (FSIQ) 130 98 125-133 Very Superior      Summary of WAIS-IV Scaled Subtest Scores        Verbal Comprehension Perceptual Reasoning   Similarities 14 Block Design 15   Vocabulary 15 Matrix Reasoning 13   Information 15 Visual Puzzles 15   Working Memory Processing Speed   Letter-Number-Sequencing  14 Symbol Search 13   Arithmetic 13 Coding 15   *Confidence intervals at 95th percentile     Please note that the Letter-Number-Sequence subtest was supplemented for the Digit Span subtest due to the patient completing the Digit Span subtest in January of 2025 with another psychologist.     Please note that patient reported that she abstained from Phentermine for 4 days prior to completing the WAIS.          CNS Vital Signs Neurocognitive Battery  The CNS Vital Signs Neurocognitive Battery is a remotely-administered assessment comprised of seven core subtests to individually measure the patient's verbal memory, visual memory, motor speed, psychomotor speed, reaction time, focus, ability to sustain attention and ability to adapt to changing rules and tasks.       Above average domain scores indicate a standard score (SS) greater than 109 or a Percentile Rank (AR) greater than 74, indicating a high functioning test subject. Average is a SS  or AR 25-74, indicating normal function. Low Average is a SS 80-89 or AR 9-24 indicating a slight deficit or impairment. Below Average is a SS 70-79 or AR 2-8, indicating a moderate level of deficit or impairment. Very Low is a SS less than 70 or a AR less than 2, indicating a deficit and impairment.  Validity Indicator denotes a  guideline for representing the possibility of an invalid test or domain score, and can be influenced by patient understanding, effort, or other conditions.    The patient's results are detailed below:     Domain Standard Score Percentile Description Validity   Neurocognitive Index 97 42 Average YES   Composite Memory Measure 117 87 Above YES   Verbal Memory 116 86 Above YES   Visual Memory 113 81 Above YES   Psychomotor Speed 97 42 Average YES   Reaction Time 89 23 Low Average YES   *Complex Attention 94 34 Average YES   *Cognitive Flexibility 88 21 Low Average YES   *Processing Speed 117 87 Above YES   *Executive Function 94 34 Average YES   *Simple Attention 91 27 Average YES   Motor Speed 88 21 Low Average YES   *Scales that are the most indicative of ADHD     Neurocognitive Index (NCI): Measures an average score derived from the domain scores or a general assessment of the overall neurocognitive status of the patient. The patient's NCI score is 97, with a percentile of 42, and falls within the Average range.     Composite Memory: Measures how well subject can recognize, remember, and retrieve words and geometric figures, and is comprised of the Visual and Verbal Memory domains. The patient's Composite Memory score is 117, with a percentile of 87, and falls within the Above range.     Verbal Memory: Measures how well subject can recognize, remember, and retrieve words. The patient's Verbal Memory score is 116, with a percentile of 87, and falls within the Above range.     Visual Memory: Measures how well subject can recognize, remember and retrieve geometric figures. The patient's Visual Memory score is 113, with a percentile of 81, and falls within the Above range.     Psychomotor Speed: Measures how well a subject perceives, attends, responds to complex visual-perceptual information and performs simple fine motor coordination, and is comprised of the Motor Speed and Processing Speed indexes. The patient's  Psychomotor Speed score is 97, with a percentile of 42, and falls within the Average range.     Reaction Time: Measures how quickly the subject can react, in milliseconds, to a simple and increasingly complex direction set. The patient's Reaction Time score is 89, with a percentile of 23, and falls within the Low Average range.     Complex Attention: Measures the ability to track and respond to a variety of stimuli over lengthy periods of time and/or perform complex mental tasks requiring vigilance quickly and accurately. The patient's Complex Attention score is 94, with a percentile of 34, and falls within the Average range.     Cognitive Flexibility: Measures how well subject is able to adapt to rapidly changing and increasingly complex set of directions and/or to manipulate the information. The patient's Cognitive Flexibility score is 88, with a percentile of 21, and falls within the Low Average range.     Processing Speed: Measures how well a subject recognizes and processes information i.e., perceiving, attending/responding to incoming information, motor speed, fine motor coordination, and visual-perceptual ability. The patient's Processing Speed score is 117, with a percentile of 87, and falls within the Above range.     Executive Function: Measures how well a subject recognizes rules, categories, and manages or navigates rapid decision making. The patient's Executive Function score is 94, with a percentile of 34, and falls within the Average range.     Simple Attention: Measures the ability to track and respond to a single defined stimulus over lengthy periods of time while performing vigilance and response inhibition quickly and accurately to a simple task. The patient's Simple Attention score is 91, with a percentile of 27, and falls within the Average range.     Motor Speed: Measure: Ability to perform simple movements to produce and satisfy an intention towards a manual action and goal. The patient's  Motor Speed score is 88, with a percentile of 21, and falls within the Low Average range.    Please note that patient reported that she abstained from Phentermine for 4 days prior to completing the CNS Vital Signs Neurocognitive Battery.    [place] Sleepiness Scale:  Self-report measure of how likely the patient is to doze off in eight different situations. Max score is 24. Sufficient sleep is a score of 6 or less. Scores of 7-8 are considered average. A sleep medicine consultation is recommended for scores of 9 or above.  Patient's score = 6, considered not appropriate for a referral to a sleep specialist.     PTSD Checklist (PCL-5) SF-20  The PCL-5 is a 20-item self-report measure that assesses the 20 DSM-5 symptoms of PTSD. The PCL-5 has a variety of purposes, which include,   -Monitoring symptom change during and after treatment  -Screening individuals for PTSD  -Making a provisional PTSD diagnosis    A total score of 31-33 or higher suggests the patient may benefit from PTSD treatment.  Patient score = 39. It is recommended that patient seek treatment for their PTSD symptoms.       Generalized Anxiety Disorder Questionnaire (LEEANN-7)  This questionnaire is designed to assess for anxiety in adults.  Based on the score, she is experiencing moderate symptoms of anxiety. Client identified the following symptoms of anxiety: feeling on edge/nervous/anxious, difficulty controlling worry, worrying about many different things, trouble relaxing, being restless, and becoming easily annoyed or irritable.    Patient Health Questionnaire- 9 (PHQ-9)   This questionnaire is designed to assess for depression in adults.  Based on the score, she is experiencing mild symptoms of depression. Client identified the following symptoms of depression: depressed mood, lack of interest, difficulty with sleep, poor appetite or overeating, feeling bad about self, poor concentration, and restlessness or lethargy.       Collateral  Information:  Review of previous ADHD Assessment    Patient was assessed by Yvonne Tomas PsyD LP on 1/3/2025 and report written on 1/20/2025    Tests Administered:  Torres' Adult ADHD Rating Scales - Self-Report: Short Version  Torres Continuous Performance Test 3rd Edition  Digit Span from WAIS IV  Purdum Making Test  MMPI-3      She was diagnosed with Depression and Generalized Anxiety Disorder.       Email collaboration with patient's therapist, OLIVE Maciel, [facility]     - How long have you worked with the patient? Client intake 12/28/2023    -What are the patient's diagnoses? Generalized Anxiety Disorder, Major Depressive Disorder, Recurrent, Moderate.     -Does the patient typically complete therapy homework? The client is moderately successful with therapy homework.      - Does the patient arrive on time for appointments? The client tends to arrive a few minutes prior to scheduled sessions.     -Does the patient appear hyperactive or easily distracted in sessions? No, in session the client typically presents as anxious, however, she is attentive, focused, and her thought process is linear.     -Does the patient tend to interrupt or have impulsive speech? No    -Does the patient need to be redirected often? No    -Does the patient talk about how ADHD symptoms negatively impact their life? Yes, the client often reports being impacted by difficulty with executive functioning, primarily in the home related to household chores. This appears to be compounded by limited support from her spouse, excessive acquisition of items, from both gifts and purchases and difficulty discarding items that are no longer in active use, often due in part to their perceived value and feeling overwhelmed by the task. The client is often avoidant of these tasks and experiences significant shame related to her capacity to maintain a tidy home.   The client reports difficulty with memory, predominantly related to scheduling in  "her personal life and occasionally with work related tasks. The client reports noticing these symptoms in the last six to twelve months.     -Does she typically talk about ADHD symptoms causing anxiety, or anxiety causing inattention. She seemed unsure when we discussed this. In the last few sessions, the client has expressed feelings of stress and anxiety related to her memory, predominantly scheduling.   The client reports feeling anxious about her executive functioning, specifically related to the order and tidiness of her home. The client's anxiety is significant across settings; however, it is likely that anxiety and/or depressive symptoms increase inattention.     -Is there anything else that you think would be helpful for me to know as I assess for ADHD?  While the client does present with some symptoms that could be consistent with ADHD,  the symptoms she has endorsed with this writer and her behavior in session do not appear to be consistent with ADHD and while I can not fully rule out ADHD it is my opinion that her symptoms are likely better explained by depression and anxiety. The client does currently appear to be in a depressive episode and it has been recommended that she consult with her physician to discuss changes to her current medication.      Please let me know if there is anything else I can do to help.     Thank you,   Jaylin Lind, MSW, [facility]            Summary (based on clinical interview, review of records, test results):  Identifying Information:  Patient is a 35 year old,    individual.  Patient was referred for an assessment by self and Milligan, [facility]irdre E, [facility] .  Patient attended the session alone.     Chief Complaint:   The reason for seeking services at this time is: \"ADHD Assessment \".  The problem(s) began Patient reported that she thinks the symptoms have been present since early childhood and they exacerbated at age 30 after she had a child.       " "  Patient reported that she has tried therapy and medication to treat her anxiety and depression.         The purpose of this evaluation is to: provide treatment recommendations and clarify diagnosis. Patient reported seeking services at this time for diagnostic assessment and recommendations for treatment.  Patient reported that has completed a previous ADHD diagnostic assessment at the age of 34.  Patient has received a previous diagnosis of anxiety. Patient reported that medication has not been prescribed medication to address these problems.         Patient reported that they have experienced recurrent episodes depression since approximately age 15. Patient reported that her most \"significant\" depression occurred when she was 30 years of age and was post-partum. Patient reported that her baby was born in 2020 and she experienced depression and anxiety. Patient reported that her baby was \"kind of colicy\" and they did not have in person appointments due to the COVID-19 pandemic.      Patient reported that they have experienced anxiety since approximately age 6.  Patient reported that her current triggers for anxiety include difficulty with completing chores and de-cluttering her home. Patient reported that she feels overwhelmed by the physical clutter and she also feels anxious about her task list not being completed. Patient reported that her anxiety and difficulty with completing tasks exacerbated when she had a child. Discussed how patient's stressors could be related to her phase of life. For example, difficulty staying on top of chores, while raising a child and working full time.      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 Patient reported that she sleeps 5-6 hours per night when she is working and approximately 8 hours per night when she is off for 9 days at a time.      Patient reported that she takes Phentermine " "to help with portion control and due to \"over-eating.\" Discussed patient asking her PCP if she can abstain from Phentermine for 4 days in a row if the plan is to move forward with cognitive testing.      Patient reported the following ADHD symptoms: is often easily distracted, is disorganized or experiences difficulty maintaining organization, difficulty with motivation to begin tasks, difficulty with task completion, tends to be forgetful, experiences difficulty with money management, and often engages in impulsive spending. Patient reported that the symptoms first began when they were approximately 6 years of age. Patient reported that she thinks the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 his or her way of doing things  NO    (7) adopts a miserly spending style toward both self and others; money is viewed as something to be hoarded for future catastrophes  NO    (8) shows rigidity and stubbornness NO     Patient does not meet diagnostic criteria for Obsessive Compulsive Personality Disorder.          Post-traumatic Stress Disorder Diagnostic Criteria:      Exposure to actual or threatened death, serious injury, or sexual violence in one (or more) of the following ways:  Directly experiencing the traumatic  event(s).  -Witnessing, in person, the event(s) as it occurred to others.  -Learning that the traumatic event(s) occurred to a close family member or close friend. In cases of actual or threatened death of a family member or friend, the event(s) must have been violent or accidental.  YES  Patient reported that when she was 20, her cousin (older sister-like to patient)  by suicide.  Patient reported that when she was 21, her aunt  in a car accident.   Patient reported that when she was 25, her father  undecidedly from an aneurism.  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       -Experiencing repeated or extreme exposure to aversive details of the traumatic event(s) (e.g., first responders collecting human remains; police officers repeatedly exposed to details of child abuse). Note: Criterion A4 does not apply to exposure through electronic media, television, movies, or pictures, unless this exposure is work related.      Presence of one (or more) of the following intrusion symptoms associated with the traumatic event(s), beginning after the traumatic event(s) occurred:  -Recurrent, involuntary, and intrusive distressing memories of the traumatic event(s). Note: In children older than 6 years, repetitive play may occur in which themes or aspects of the traumatic event(s) are expressed. 28  -Recurrent distressing dreams in which the content and/or affect of the dream are related to the traumatic event(s). Note: In children, there may be frightening dreams without recognizable content.  -Dissociative reactions (e.g., flashbacks) in which the individual feels or acts as if the traumatic event(s) were recurring. (Such reactions may occur on a continuum, with the most extreme expression being a complete loss of awareness of present surroundings.) Note: In children,  trauma-specific reenactment may occur in play. YES, 20, 21, 28  -Intense or prolonged psychological distress at exposure to internal or external cues that symbolize or resemble an aspect of the traumatic event(s). 28  -Marked physiological reactions to internal or external cues that symbolize or resemble an aspect of the traumatic event(s). 28      Persistent avoidance of stimuli associated with the traumatic event(s), beginning after the traumatic event(s) occurred, as evidenced by one or both of the following:  -Avoidance of or efforts to avoid distressing memories, thoughts, or feelings about or closely associated with the traumatic event(s).  -Avoidance of or efforts to avoid external reminders (people, places, conversations, activities, objects, situations) that arouse distressing memories, thoughts, or feelings about or closely associated with the traumatic event(s).      Negative alterations in cognitions and mood associated with the traumatic event(s), beginning or worsening after the traumatic event(s) occurred, as evidenced by two (or more) of the following:  -Inability to remember an important aspect of the traumatic event(s) (typically due to dissociative amnesia, and not to other factors such as head injury, alcohol, or drugs). YES 20, 21  -Persistent and exaggerated negative beliefs or expectations about oneself, others, or the world (e.g.,  I am bad,   No one can be trusted,   The world is completely dangerous,   My whole nervous system is permanently ruined ).  -Persistent, distorted cognitions about the cause or consequences of the traumatic event(s) that lead the individual to blame himself/herself or others.  -Persistent negative emotional state (e.g., fear, horror, anger, guilt, or shame).  -Markedly diminished interest or participation in significant activities.  -Feelings of detachment or estrangement from others.  -Persistent inability to experience positive emotions (e.g., inability to  experience happiness, satisfaction, or loving feelings).      Marked alterations in arousal and reactivity associated with the traumatic event(s), beginning or worsening after the traumatic event(s) occurred, as evidenced by two (or more) of the following:  -Irritable behavior and angry outbursts (with little or no provocation), typically expressed as verbal or physical aggression toward people or objects.  -Reckless or self-destructive behavior.  -Hypervigilance.  -Exaggerated startle response.  -Problems with concentration.  -Sleep disturbance (e.g., difficulty falling or staying asleep or restless sleep).    Duration of the disturbance (Criteria B, C, D and E) is more than 1 month.    The disturbance causes clinically significant distress or impairment in social, occupational, or other important areas of functioning. YES  The disturbance is not attributable to the physiological effects of a substance (e.g., medication, alcohol) or another medical condition. YES    With delayed expression: If the full diagnostic criteria are not met until at least 6 months after the event (although the onset and expression of some symptoms may be immediate).      Patient does not currently meet diagnostic criteria for PTSD. 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       ADHD is a neurodevelopmental disorder. As such, to qualify for a diagnosis of ADHD an individual must show some symptoms of the disorder before the age of 12; these symptoms must currently be present to a degree that is inconsistent with their developmental level; the symptoms must negatively impact the individual;  they must be present for more than six months;and  they must occur in multiple areas of the individuals life (e.g. Home and school).     Results of testing were significant for depression, anxiety, and trauma. Rating scales suggested that the patient is currently experiencing symptoms of inattention that have been present to  some extent since childhood. The patient endorsed moderate overall symptoms of inattention and hyperactivity-impulsivity. The patient endorsed inattention more often than she did hyperactivity-impulsivity. The patient's collateral reporters indicating significantly fewer symptoms for current level of functioning and for her childhood.  The patient reported scores of mild functional impairment. She reported significant difficulty  with functioning in the following roles: home-chores and daily responsibilities. Moreover, she reported scores of marginal executive dysfunction. She reported significant difficulty with self-motivation. The patient and her collateral reporters reported similar scores on the rating scales. Intelligence testing suggested that the patient is functioning in the overall Very Superior range.  Her scores on the working memory and processing speed tasks were similar to her other scores. These tasks rely heavily on attention. This pattern is atypical among people with attention deficits.  Patient demonstrated lower scores on the Cognitive Flexibility domain of the CNS Vital Signs Neurocognitive Battery and Average or Above scores on the Complex Attention, Processing Speed, Executive Functioning, and Simple Attention CNS Vital Signs Neurocognitive Battery domains.  This pattern of scores is atypical among people with ADHD. Additionally, the patient is experiencing symptoms of anxiety and depression, which were reported on questionnaires.      In summary, data gathered over the course of this evaluation suggest that there is not evidence to support a diagnosis of ADHD. Data from ADHD questionnaires was equivocal, and Patient's pattern of scores on neurocognitive tasks (CNS Vital Signs) as well as IQ testing (WAIS-IV) argues against a fundamental deficit in attention abilities.       Patient was not administered the MMPI-3 or Trail Making Test (TMT), because she took the MMPI-3 and TMT in January of  2025.       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 Schedule an appointment with your physician to discuss a medication evaluation.  2. Individual therapy is recommended for the treatment of anxiety, trauma, and depression. Therapies focusing on identifying and challenging problematic thought processes can be beneficial.  You can schedule with an [facility] therapist by calling [phone].  Recommend patient continue working with current therapist.   3. Recommend patient see an EMDR certified therapist to treat trauma symptoms. [email or web address]/find-[email or web address]-[email or web address]  4. Access resources through websites, books, and articles such as those provided in the handout.  5. Schedule a follow-up appointment with me in about six weeks to review symptoms, treatment involvement, and struggles and/or successes.    You May Find the Following ADHD Resources Helpful:    https://www.Dynamixyz.com/  https://[employer]/LBR.Tracksmith/  https://[employer].Tracksmith/  https://[employer].Tracksmith/about-2/  https://[employer].com/durga-mackenzie/  https://www.adhdmarriage.com/  https://cydney.org/  https://www.jenni.org/About-Mental-Illness/Mental-Health-Conditions/ADHD  https://www.[employer].Tracksmith/  https://[employer]anteaaline.com/  https://www.TherMark.com/  https://www.[employer].org/         Juliet Muniz Psy.D, LP     6/30/2025         Psychological Testing   Billing/Services Summary       Testing Evaluation Services Base: 82951  (1st 60 mins) Add-on: 64149  (each addtl 60 mins)   Record Review and  Clarify Referral Question   5/13/2025 11:55-12:05PM Medical Record Review (10)  5/21/25 12:40-12:50PM Reviewed previous ADHD Assessment (10) 20 minutes   Clinical Decision Making/Battery Modification   6/23/2025 6:40-6:50AM emailed with therapist (10) 10 minutes   Integration/Report Generation   6/11/2025 10:00-10:10AM CNS (10)  6/11/2025 10:10-10:55AM [facility] (45)  6/23/2025 10:10-10:20AM WAIS Report (10)  6/23/2025 10:20-11:00AM (40), 6/30/2025 10:35-11:00AM (25) Integrated Report (65)   130 minutes   Post-Service Work   6/30/2025 10:15-10:35AM 20 minutes   Total Time: 180 minutes (3 hours, 0 minutes)   Total Units: 1 2       Test Administration and Scoring Base: 10012  (1st 30 mins) Add-on: 95886  (each addtl 30 mins)   Test Administration (Face-to-Face)  6/2/2025 11:54-11:59AM Explained how to complete the [facility] questionnaires, CNS  (5)  6/2/2025 12:50-1:00PM sent [facility] and CNS (10)  6/18/2025 7:00-7:05AM Newman Administration (5)  6/18/2025 7:05-8:15AM WAIS IV Administration (70)   90 minutes   Scoring (Non-Face-to-Face)   6/18/25 10:25-10:40AM WAIS scoring 15 minutes   Total Time: 105 minutes (1 hours, 45 minutes)   Total Units: 1 2       Diagnosis(es): (ICD-10)  296.35 (F33.41)  Major Depressive Disorder, Recurrent Episode, In partial remission With anxious distress  300.02 (F41.1) Generalized Anxiety Disorder; Adjustment Disorders  309.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

## 2025-06-30 ENCOUNTER — DOCUMENTATION ONLY (OUTPATIENT)
Facility: CLINIC | Age: 35
End: 2025-06-30
Payer: COMMERCIAL

## 2025-06-30 ENCOUNTER — VIRTUAL VISIT (OUTPATIENT)
Facility: CLINIC | Age: 35
End: 2025-06-30
Payer: COMMERCIAL

## 2025-06-30 DIAGNOSIS — F41.1 GENERALIZED ANXIETY DISORDER: Primary | ICD-10-CM

## 2025-06-30 DIAGNOSIS — F33.41 MAJOR DEPRESSIVE DISORDER, RECURRENT EPISODE, IN PARTIAL REMISSION WITH ANXIOUS DISTRESS: ICD-10-CM

## 2025-06-30 DIAGNOSIS — F43.89 ADJUSTMENT REACTION TO CHRONIC STRESS: ICD-10-CM

## 2025-06-30 PROCEDURE — 96137 PSYCL/NRPSYC TST PHY/QHP EA: CPT | Mod: 95 | Performed by: PSYCHOLOGIST

## 2025-06-30 PROCEDURE — 96131 PSYCL TST EVAL PHYS/QHP EA: CPT | Mod: 95 | Performed by: PSYCHOLOGIST

## 2025-06-30 PROCEDURE — 96131 PSYCL TST EVAL PHYS/QHP EA: CPT | Performed by: PSYCHOLOGIST

## 2025-06-30 PROCEDURE — 90837 PSYTX W PT 60 MINUTES: CPT | Mod: 95 | Performed by: PSYCHOLOGIST

## 2025-06-30 PROCEDURE — 96137 PSYCL/NRPSYC TST PHY/QHP EA: CPT | Performed by: PSYCHOLOGIST

## 2025-06-30 PROCEDURE — 96130 PSYCL TST EVAL PHYS/QHP 1ST: CPT | Performed by: PSYCHOLOGIST

## 2025-06-30 PROCEDURE — 96136 PSYCL/NRPSYC TST PHY/QHP 1ST: CPT | Mod: 95 | Performed by: PSYCHOLOGIST

## 2025-06-30 PROCEDURE — 96136 PSYCL/NRPSYC TST PHY/QHP 1ST: CPT | Performed by: PSYCHOLOGIST

## 2025-06-30 PROCEDURE — 96130 PSYCL TST EVAL PHYS/QHP 1ST: CPT | Mod: 95 | Performed by: PSYCHOLOGIST

## 2025-06-30 NOTE — PROGRESS NOTES
[facility]  ADHD Evaluation        Patient: Liyah Rodriguez   YOB: 1990  MRN: [id number]  Date(s) of assessment:  Diagnostic Assessment 6-2-25, Anushka self-report and collateral measures scored and interpreted 6-11-25, and WAIS 6-18-25  CNS Vital Signs Neurocognitive Battery 6-11-25        Information about appointment:  Client attended three sessions to aid in determining client's mental health diagnosis or diagnoses and treatment recommendations that best address client concerns. Client records including medical and previous evaluation were reviewed. A diagnostic assessment was conducted over the first two appointments. Client completed several rating scales to assist in assessing attention-related and other mental health symptoms that may be causing impairments in functioning. Rating scales were also completed by a collateral contact.    Assessment tools:      Anushka Adult ADHD Rating Scale-IV: Self and Other Reports (BAARS-IV), Anushka Functional Impairment Scale: Self and Other Reports (BFIS), Anushka Deficits in Executive Functioning Scale: Self and Other Reports (BDEFS), Wechsler Adult Intelligence Scale--Fourth Edition (WAIS-IV), Patient Health Questionnaire-9 (PHQ-9), Generalized Anxiety Disorder-7 (LEEANN-7), and CNS Vital Signs Neurocognitive Battery  [place] Sleepiness Scale  PTSD Checklist (PCL-5) SF-20    Some measures may have been completed remotely due to virtual care, in which case observation of task completion was not possible.      Assessment Results:    Behavioral Observations:  The Patient arrived early for all appointments and scheduled follow-up appointments efficiently. The Patient appeared motivated to complete the assessment and was polite in interactions. She was oriented by three. She appeared to experience difficulty with attention and hyperactivity/impulsivity during sessions. The following results are likely to be an accurate reflection  "of Patient's current functioning.     WAIS-IV Behavioral Observations  Patient arrived ambreen and appeared motivated to complete the test. She appeared to exhibit sound effort throughout the testing process. She asked for four questions to be repeated on the Arithmetic subtest.       Anushka Adult ADHD Rating Scale-IV: Self and Other Reports (BAARS-IV)  The BAARS-IV assesses for symptoms of ADHD that are experienced in one's daily life. This assessment measure includes self and collateral rating scales designed to provide information regarding current and childhood symptoms of ADHD including inattention, hyperactivity, and impulsivity. Self-report scores are reported as percentiles. Scores at the 76th-83rd percentile are considered marginal, scores at the 84th-92nd percentile are considered borderline, scores at the 93rd-95th percentile are considered mild, scores at the 96th-98th percentile are considered moderate, and those at the 99th percentile are considered severe. Collateral or \"other\" rating scales are reported as number of symptoms observed in comparison to those reported by the client. Norms and percentile scores are not available for collateral reports.      Current Symptoms Scale--Self Report:   Client completed the self-report inventory of current symptoms. The results indicate that the client's Total ADHD Score was 46 which places her in the 97th percentile for overall ADHD symptoms. In addition, the client endorsed 7/9 (98th percentile) Inattention symptoms, 3/9 (93rd percentile) Hyperactivity-Impulsivity symptoms, and 4/9 (93rd percentile) Sluggish Cognitive Tempo symptoms. Client indicated that the reported symptoms have resulted in impaired functioning in home and work. Overall, the results suggest the client is experiencing Moderate ADHD symptoms.      Current Symptoms Scale--Other Report:  Client's  completed the collateral report inventory of current symptoms. Based on the collateral " contact's observation of symptoms, the client demonstrates 1/9 Inattention symptoms, 0/5 Hyperactivity, 0/4 Impulsivity symptoms, and 0/9 Sluggish Cognitive Tempo symptoms. The client's Total ADHD Score was 24. The collateral contact indicated the client demonstrates impaired functioning in home and social relationships. The collateral- and self-report scores are significantly different for Inattention, Hyperactivity, and Total ADHD, and are within normal limits for Impulsivity and Sluggish Cognitive Tempo.      Childhood Symptoms Scale--Self-Report:  Client completed the self-report inventory of childhood symptoms. The results indicate that the client's Total ADHD Score was 41 which places her in the 88th percentile for overall ADHD symptoms in childhood. In addition, the client endorsed 8/9 (98th percentile) Inattention symptoms and  1/9 (78th percentile) Hyperactivity-Impulsivity symptoms. Client indicated that the reported symptoms resulted in impaired functioning in school and home. Overall, the results suggest the client experienced  Borderline symptoms of ADHD as a child.      Childhood Symptoms Scale--Other Report:  Client's mother completed the collateral report inventory of childhood symptoms. Based on the collateral contact's recollection of client's childhood symptoms, the client demonstrated 0/9 Inattention symptoms and 0/9 Hyperactivity-Impulsivity symptoms. The client's Total ADHD Score was 21. The collateral contact indicated the client demonstrates impaired functioning in no areas. The collateral- and self-report scores are significantly different.                           Anushka Functional Impairment Scale: Self and Other Reports (BFIS)  The BFIS is used to assess an individuals' psychosocial impairment in major life/daily activities that may be due to a mental health disorder. This assessment measure includes self and collateral rating scales. Self-report scores are reported as percentiles.  "Scores at the 76th-83rd percentile are considered marginal, scores at the 84th-92nd percentile are considered borderline, scores at the 93rd-95th percentile are considered mild, scores at the 96th-98th percentile are considered moderate, and those at the 99th percentile are considered severe.Collateral or \"other\" rating scales are reported as number of symptoms observed in comparison to those reported by the client. Norms and percentile scores are not available for collateral reports.      Results indicate the client identified impairment (scores at or greater than 93rd percentile) in the following areas: home-chores and daily responsibilities. The client's Mean Impairment Score was 2.93 (51st-75th percentile) indicating the client is reporting Marginal impairment in functioning across domains. Client's  completed the collateral rating scale, which indicated similar results. The collateral contact's scores were generally lower than the client's report.     Anushka Deficits in Executive Functioning Scale (BDEFS)  The BDEFS is a measure used for evaluating dimensions of adult executive functioning in daily life.This assessment measure includes self and collateral rating scales. Self-report scores are reported as percentiles. Scores at the 76th-83rd percentile are considered marginal, scores at the 84th-92nd percentile are considered borderline, scores at the 93rd-95th percentile are considered mild, scores at the 96th-98th percentile are considered moderate, and those at the 99th percentile are considered severe.Collateral or \"other\" rating scales are reported as number of symptoms observed in comparison to those reported by the client. Norms and percentile scores are not available for collateral reports.      Results indicate the client's Total Executive Functioning Score was 171  (79th percentile). The ADHD-Executive Functioning Index score was 20 (51st-75th percentile). These scores suggest the client has " Marginal deficits in executive functioning. These deficits may be due to ADHD. Results indicate the client identified significant deficits in the following areas: self-motivation 94th. Client's  completed the collateral rating scale, which indicated similar results. The collateral contact's scores were generally lower than the client's report.      Wechsler Adult Intelligence Scale--Fourth Edition (WAIS-IV)  Client s intellectual functioning was assessed using the WAIS-IV. This measure provides a Full Scale Score, as well as several index scores measuring specific areas of cognitive ability. Index scores are reported using standard scores which have a mean of 100 and a standard deviation of 15. Subtest scores are reported using scaled scores that have a mean of 10 and a standard deviation of 2. Client s scores are reported at the 95 percent confidence interval, which indicates that there is a 95 percent chance that his true score falls within the stated range.  Results indicate that the FSIQ, a measure of overall intelligence, is the best descriptor of the client s functioning.  Client s score on the Verbal Comprehension Index (VCI), a measure of verbal concept formation, verbal reasoning, and acquired knowledge, was in the Superior range (95% chance 120-132; 96th percentile). Client s score on the Perceptual Reasoning Index (JAYA), a measure of perceptual and fluid reasoning, spatial processing, and visual-motor integration, was in the Superior range (95% chance 118-130; 95th percentile). Client scored in the High Average range (95% chance 111-125;  90th percentile) on the Working Memory Index (WMI), a measure of ability to retain information in memory, perform some operation or manipulation, and produce a result. Client s score on the Processing Speed Index (PSI), a measure of short-term visual memory, attention, and visual-motor coordination was in the Superior range (95% chance 111-128; 93rd percentile).       Summary of WAIS-IV Index Scores    Index  Score Percentile Rank Confidence  Interval* Qualitative Description   Verbal Comprehension Index (VCI) 127 96 120-132 Superior   Perceptual Reasoning Index (JAYA) 125 95 118-130 Superior   Working Memory Index (WMI) 119 90 111-125 High Average   Processing Speed Index (PSI) 122 93 111-128 Superior   Full Scale IQ (FSIQ) 130 98 125-133 Very Superior      Summary of WAIS-IV Scaled Subtest Scores        Verbal Comprehension Perceptual Reasoning   Similarities 14 Block Design 15   Vocabulary 15 Matrix Reasoning 13   Information 15 Visual Puzzles 15   Working Memory Processing Speed   Letter-Number-Sequencing  14 Symbol Search 13   Arithmetic 13 Coding 15   *Confidence intervals at 95th percentile     Please note that the Letter-Number-Sequence subtest was supplemented for the Digit Span subtest due to the patient completing the Digit Span subtest in January of 2025 with another psychologist.     Please note that patient reported that she abstained from Phentermine for 4 days prior to completing the WAIS.          CNS Vital Signs Neurocognitive Battery  The CNS Vital Signs Neurocognitive Battery is a remotely-administered assessment comprised of seven core subtests to individually measure the patient's verbal memory, visual memory, motor speed, psychomotor speed, reaction time, focus, ability to sustain attention and ability to adapt to changing rules and tasks.       Above average domain scores indicate a standard score (SS) greater than 109 or a Percentile Rank (TX) greater than 74, indicating a high functioning test subject. Average is a SS  or TX 25-74, indicating normal function. Low Average is a SS 80-89 or TX 9-24 indicating a slight deficit or impairment. Below Average is a SS 70-79 or TX 2-8, indicating a moderate level of deficit or impairment. Very Low is a SS less than 70 or a TX less than 2, indicating a deficit and impairment.  Validity Indicator denotes a  guideline for representing the possibility of an invalid test or domain score, and can be influenced by patient understanding, effort, or other conditions.    The patient's results are detailed below:     Domain Standard Score Percentile Description Validity   Neurocognitive Index 97 42 Average YES   Composite Memory Measure 117 87 Above YES   Verbal Memory 116 86 Above YES   Visual Memory 113 81 Above YES   Psychomotor Speed 97 42 Average YES   Reaction Time 89 23 Low Average YES   *Complex Attention 94 34 Average YES   *Cognitive Flexibility 88 21 Low Average YES   *Processing Speed 117 87 Above YES   *Executive Function 94 34 Average YES   *Simple Attention 91 27 Average YES   Motor Speed 88 21 Low Average YES   *Scales that are the most indicative of ADHD     Neurocognitive Index (NCI): Measures an average score derived from the domain scores or a general assessment of the overall neurocognitive status of the patient. The patient's NCI score is 97, with a percentile of 42, and falls within the Average range.     Composite Memory: Measures how well subject can recognize, remember, and retrieve words and geometric figures, and is comprised of the Visual and Verbal Memory domains. The patient's Composite Memory score is 117, with a percentile of 87, and falls within the Above range.     Verbal Memory: Measures how well subject can recognize, remember, and retrieve words. The patient's Verbal Memory score is 116, with a percentile of 87, and falls within the Above range.     Visual Memory: Measures how well subject can recognize, remember and retrieve geometric figures. The patient's Visual Memory score is 113, with a percentile of 81, and falls within the Above range.     Psychomotor Speed: Measures how well a subject perceives, attends, responds to complex visual-perceptual information and performs simple fine motor coordination, and is comprised of the Motor Speed and Processing Speed indexes. The patient's  Psychomotor Speed score is 97, with a percentile of 42, and falls within the Average range.     Reaction Time: Measures how quickly the subject can react, in milliseconds, to a simple and increasingly complex direction set. The patient's Reaction Time score is 89, with a percentile of 23, and falls within the Low Average range.     Complex Attention: Measures the ability to track and respond to a variety of stimuli over lengthy periods of time and/or perform complex mental tasks requiring vigilance quickly and accurately. The patient's Complex Attention score is 94, with a percentile of 34, and falls within the Average range.     Cognitive Flexibility: Measures how well subject is able to adapt to rapidly changing and increasingly complex set of directions and/or to manipulate the information. The patient's Cognitive Flexibility score is 88, with a percentile of 21, and falls within the Low Average range.     Processing Speed: Measures how well a subject recognizes and processes information i.e., perceiving, attending/responding to incoming information, motor speed, fine motor coordination, and visual-perceptual ability. The patient's Processing Speed score is 117, with a percentile of 87, and falls within the Above range.     Executive Function: Measures how well a subject recognizes rules, categories, and manages or navigates rapid decision making. The patient's Executive Function score is 94, with a percentile of 34, and falls within the Average range.     Simple Attention: Measures the ability to track and respond to a single defined stimulus over lengthy periods of time while performing vigilance and response inhibition quickly and accurately to a simple task. The patient's Simple Attention score is 91, with a percentile of 27, and falls within the Average range.     Motor Speed: Measure: Ability to perform simple movements to produce and satisfy an intention towards a manual action and goal. The patient's  Motor Speed score is 88, with a percentile of 21, and falls within the Low Average range.    Please note that patient reported that she abstained from Phentermine for 4 days prior to completing the CNS Vital Signs Neurocognitive Battery.    [place] Sleepiness Scale:  Self-report measure of how likely the patient is to doze off in eight different situations. Max score is 24. Sufficient sleep is a score of 6 or less. Scores of 7-8 are considered average. A sleep medicine consultation is recommended for scores of 9 or above.  Patient's score = 6, considered not appropriate for a referral to a sleep specialist.     PTSD Checklist (PCL-5) SF-20  The PCL-5 is a 20-item self-report measure that assesses the 20 DSM-5 symptoms of PTSD. The PCL-5 has a variety of purposes, which include,   -Monitoring symptom change during and after treatment  -Screening individuals for PTSD  -Making a provisional PTSD diagnosis    A total score of 31-33 or higher suggests the patient may benefit from PTSD treatment.  Patient score = 39. It is recommended that patient seek treatment for their PTSD symptoms.       Generalized Anxiety Disorder Questionnaire (LEEANN-7)  This questionnaire is designed to assess for anxiety in adults.  Based on the score, she is experiencing moderate symptoms of anxiety. Client identified the following symptoms of anxiety: feeling on edge/nervous/anxious, difficulty controlling worry, worrying about many different things, trouble relaxing, being restless, and becoming easily annoyed or irritable.    Patient Health Questionnaire- 9 (PHQ-9)   This questionnaire is designed to assess for depression in adults.  Based on the score, she is experiencing mild symptoms of depression. Client identified the following symptoms of depression: depressed mood, lack of interest, difficulty with sleep, poor appetite or overeating, feeling bad about self, poor concentration, and restlessness or lethargy.       Collateral  Information:  Review of previous ADHD Assessment    Patient was assessed by Yvonne Tomas PsyD LP on 1/3/2025 and report written on 1/20/2025    Tests Administered:  Torres' Adult ADHD Rating Scales - Self-Report: Short Version  Torres Continuous Performance Test 3rd Edition  Digit Span from WAIS IV  [place] Making Test  MMPI-3      She was diagnosed with Depression and Generalized Anxiety Disorder.       Email collaboration with patient's therapist, OLIVE Maciel, [facility]     - How long have you worked with the patient? Client intake 12/28/2023    -What are the patient's diagnoses? Generalized Anxiety Disorder, Major Depressive Disorder, Recurrent, Moderate.     -Does the patient typically complete therapy homework? The client is moderately successful with therapy homework.      - Does the patient arrive on time for appointments? The client tends to arrive a few minutes prior to scheduled sessions.     -Does the patient appear hyperactive or easily distracted in sessions? No, in session the client typically presents as anxious, however, she is attentive, focused, and her thought process is linear.     -Does the patient tend to interrupt or have impulsive speech? No    -Does the patient need to be redirected often? No    -Does the patient talk about how ADHD symptoms negatively impact their life? Yes, the client often reports being impacted by difficulty with executive functioning, primarily in the home related to household chores. This appears to be compounded by limited support from her spouse, excessive acquisition of items, from both gifts and purchases and difficulty discarding items that are no longer in active use, often due in part to their perceived value and feeling overwhelmed by the task. The client is often avoidant of these tasks and experiences significant shame related to her capacity to maintain a tidy home.   The client reports difficulty with memory, predominantly related to scheduling in  "her personal life and occasionally with work related tasks. The client reports noticing these symptoms in the last six to twelve months.     -Does she typically talk about ADHD symptoms causing anxiety, or anxiety causing inattention. She seemed unsure when we discussed this. In the last few sessions, the client has expressed feelings of stress and anxiety related to her memory, predominantly scheduling.   The client reports feeling anxious about her executive functioning, specifically related to the order and tidiness of her home. The client's anxiety is significant across settings; however, it is likely that anxiety and/or depressive symptoms increase inattention.     -Is there anything else that you think would be helpful for me to know as I assess for ADHD?  While the client does present with some symptoms that could be consistent with ADHD,  the symptoms she has endorsed with this writer and her behavior in session do not appear to be consistent with ADHD and while I can not fully rule out ADHD it is my opinion that her symptoms are likely better explained by depression and anxiety. The client does currently appear to be in a depressive episode and it has been recommended that she consult with her physician to discuss changes to her current medication.      Please let me know if there is anything else I can do to help.     Thank you,   Jaylin Lind, MSW, [facility]            Summary (based on clinical interview, review of records, test results):  Identifying Information:  Patient is a 35 year old,    individual.  Patient was referred for an assessment by self and Milligan, [facility]ird[facility] E[facility] .  Patient attended the session alone.     Chief Complaint:   The reason for seeking services at this time is: \"ADHD Assessment \".  The problem(s) began Patient reported that she thinks the symptoms have been present since early childhood and they exacerbated at age 30 after she had a child.       " "  Patient reported that she has tried therapy and medication to treat her anxiety and depression.         The purpose of this evaluation is to: provide treatment recommendations and clarify diagnosis. Patient reported seeking services at this time for diagnostic assessment and recommendations for treatment.  Patient reported that has completed a previous ADHD diagnostic assessment at the age of 34.  Patient has received a previous diagnosis of anxiety. Patient reported that medication has not been prescribed medication to address these problems.         Patient reported that they have experienced recurrent episodes depression since approximately age 15. Patient reported that her most \"significant\" depression occurred when she was 30 years of age and was post-partum. Patient reported that her baby was born in 2020 and she experienced depression and anxiety. Patient reported that her baby was \"kind of colicy\" and they did not have in person appointments due to the COVID-19 pandemic.      Patient reported that they have experienced anxiety since approximately age 6.  Patient reported that her current triggers for anxiety include difficulty with completing chores and de-cluttering her home. Patient reported that she feels overwhelmed by the physical clutter and she also feels anxious about her task list not being completed. Patient reported that her anxiety and difficulty with completing tasks exacerbated when she had a child. Discussed how patient's stressors could be related to her phase of life. For example, difficulty staying on top of chores, while raising a child and working full time.      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 Patient reported that she sleeps 5-6 hours per night when she is working and approximately 8 hours per night when she is off for 9 days at a time.      Patient reported that she takes Phentermine " "to help with portion control and due to \"over-eating.\" Discussed patient asking her PCP if she can abstain from Phentermine for 4 days in a row if the plan is to move forward with cognitive testing.      Patient reported the following ADHD symptoms: is often easily distracted, is disorganized or experiences difficulty maintaining organization, difficulty with motivation to begin tasks, difficulty with task completion, tends to be forgetful, experiences difficulty with money management, and often engages in impulsive spending. Patient reported that the symptoms first began when they were approximately 6 years of age. Patient reported that she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 to exactly his or her way of doing things  NO    (7) adopts a miserly spending style toward both self and others; money is viewed as something to be hoarded for future catastrophes  NO    (8) shows rigidity and stubbornness NO     Patient does not meet diagnostic criteria for Obsessive Compulsive Personality Disorder.          Post-traumatic Stress Disorder Diagnostic Criteria:      Exposure to actual or threatened death, serious injury, or sexual violence in one (or more) of the following ways:  Directly experiencing the traumatic  event(s).  -Witnessing, in person, the event(s) as it occurred to others.  -Learning that the traumatic event(s) occurred to a close family member or close friend. In cases of actual or threatened death of a family member or friend, the event(s) must have been violent or accidental.  YES  Patient reported that when she was 20, her cousin (older sister-like to patient)  by suicide.  Patient reported that when she was 21, her aunt  in a car accident.   Patient reported that when she was 25, her father  undecidedly from an aneurism.  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       -Experiencing repeated or extreme exposure to aversive details of the traumatic event(s) (e.g., first responders collecting human remains; police officers repeatedly exposed to details of child abuse). Note: Criterion A4 does not apply to exposure through electronic media, television, movies, or pictures, unless this exposure is work related.      Presence of one (or more) of the following intrusion symptoms associated with the traumatic event(s), beginning after the traumatic event(s) occurred:  -Recurrent, involuntary, and intrusive distressing memories of the traumatic event(s). Note: In children older than 6 years, repetitive play may occur in which themes or aspects of the traumatic event(s) are expressed. 28  -Recurrent distressing dreams in which the content and/or affect of the dream are related to the traumatic event(s). Note: In children, there may be frightening dreams without recognizable content.  -Dissociative reactions (e.g., flashbacks) in which the individual feels or acts as if the traumatic event(s) were recurring. (Such reactions may occur on a continuum, with the most extreme expression being a complete loss of awareness of present surroundings.) Note: In children,  trauma-specific reenactment may occur in play. YES, 20, 21, 28  -Intense or prolonged psychological distress at exposure to internal or external cues that symbolize or resemble an aspect of the traumatic event(s). 28  -Marked physiological reactions to internal or external cues that symbolize or resemble an aspect of the traumatic event(s). 28      Persistent avoidance of stimuli associated with the traumatic event(s), beginning after the traumatic event(s) occurred, as evidenced by one or both of the following:  -Avoidance of or efforts to avoid distressing memories, thoughts, or feelings about or closely associated with the traumatic event(s).  -Avoidance of or efforts to avoid external reminders (people, places, conversations, activities, objects, situations) that arouse distressing memories, thoughts, or feelings about or closely associated with the traumatic event(s).      Negative alterations in cognitions and mood associated with the traumatic event(s), beginning or worsening after the traumatic event(s) occurred, as evidenced by two (or more) of the following:  -Inability to remember an important aspect of the traumatic event(s) (typically due to dissociative amnesia, and not to other factors such as head injury, alcohol, or drugs). YES 20, 21  -Persistent and exaggerated negative beliefs or expectations about oneself, others, or the world (e.g.,  I am bad,   No one can be trusted,   The world is completely dangerous,   My whole nervous system is permanently ruined ).  -Persistent, distorted cognitions about the cause or consequences of the traumatic event(s) that lead the individual to blame himself/herself or others.  -Persistent negative emotional state (e.g., fear, horror, anger, guilt, or shame).  -Markedly diminished interest or participation in significant activities.  -Feelings of detachment or estrangement from others.  -Persistent inability to experience positive emotions (e.g., inability to  experience happiness, satisfaction, or loving feelings).      Marked alterations in arousal and reactivity associated with the traumatic event(s), beginning or worsening after the traumatic event(s) occurred, as evidenced by two (or more) of the following:  -Irritable behavior and angry outbursts (with little or no provocation), typically expressed as verbal or physical aggression toward people or objects.  -Reckless or self-destructive behavior.  -Hypervigilance.  -Exaggerated startle response.  -Problems with concentration.  -Sleep disturbance (e.g., difficulty falling or staying asleep or restless sleep).    Duration of the disturbance (Criteria B, C, D and E) is more than 1 month.    The disturbance causes clinically significant distress or impairment in social, occupational, or other important areas of functioning. YES  The disturbance is not attributable to the physiological effects of a substance (e.g., medication, alcohol) or another medical condition. YES    With delayed expression: If the full diagnostic criteria are not met until at least 6 months after the event (although the onset and expression of some symptoms may be immediate).      Patient does not currently meet diagnostic criteria for PTSD. 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       ADHD is a neurodevelopmental disorder. As such, to qualify for a diagnosis of ADHD an individual must show some symptoms of the disorder before the age of 12; these symptoms must currently be present to a degree that is inconsistent with their developmental level; the symptoms must negatively impact the individual;  they must be present for more than six months;and  they must occur in multiple areas of the individuals life (e.g. Home and school).     Results of testing were significant for depression, anxiety, and trauma. Rating scales suggested that the patient is currently experiencing symptoms of inattention that have been present to  some extent since childhood. The patient endorsed moderate overall symptoms of inattention and hyperactivity-impulsivity. The patient endorsed inattention more often than she did hyperactivity-impulsivity. The patient's collateral reporters indicating significantly fewer symptoms for current level of functioning and for her childhood.  The patient reported scores of mild functional impairment. She reported significant difficulty  with functioning in the following roles: home-chores and daily responsibilities. Moreover, she reported scores of marginal executive dysfunction. She reported significant difficulty with self-motivation. The patient and her collateral reporters reported similar scores on the rating scales. Intelligence testing suggested that the patient is functioning in the overall Very Superior range.  Her scores on the working memory and processing speed tasks were similar to her other scores. These tasks rely heavily on attention. This pattern is atypical among people with attention deficits.  Patient demonstrated lower scores on the Cognitive Flexibility domain of the CNS Vital Signs Neurocognitive Battery and Average or Above scores on the Complex Attention, Processing Speed, Executive Functioning, and Simple Attention CNS Vital Signs Neurocognitive Battery domains.  This pattern of scores is atypical among people with ADHD. Additionally, the patient is experiencing symptoms of anxiety and depression, which were reported on questionnaires.      In summary, data gathered over the course of this evaluation suggest that there is not evidence to support a diagnosis of ADHD. Data from ADHD questionnaires was equivocal, and Patient's pattern of scores on neurocognitive tasks (CNS Vital Signs) as well as IQ testing (WAIS-IV) argues against a fundamental deficit in attention abilities.       Patient was not administered the MMPI-3 or Trail Making Test (TMT), because she took the MMPI-3 and TMT in January of  2025.       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 Schedule an appointment with your physician to discuss a medication evaluation.  2. Individual therapy is recommended for the treatment of anxiety, trauma, and depression. Therapies focusing on identifying and challenging problematic thought processes can be beneficial.  You can schedule with an [facility] therapist by calling [phone].  Recommend patient continue working with current therapist.   3. Recommend patient see an EMDR certified therapist to treat trauma symptoms. [email or web address]  4. Access resources through websites, books, and articles such as those provided in the handout.  5. Schedule a follow-up appointment with me in about six weeks to review symptoms, treatment involvement, and struggles and/or successes.    You May Find the Following ADHD Resources Helpful:    https://www.Anavex.com/  https://CloudMine.LeisureLink/  https://Small World Labs.LeisureLink/  https://Storone.LeisureLink/about-2/  https://Qritiqr.com/durga-mackenzie/  https://www.adhdmarriage.com/  https://cydney.org/  https://www.jenni.org/About-Mental-Illness/Mental-Health-Conditions/ADHD  https://www.GonnaBe.LeisureLink/  https://AdTonikharJustylealine.com/  https://www.Ohloh.com/  https://www.Global Pari-Mutuel ServicesbarIntiza.org/         Juliet Muniz Psy.D, LP     6/30/2025         Psychological Testing   Billing/Services Summary       Testing Evaluation Services Base: 35165  (1st 60 mins) Add-on: 24495  (each addtl 60 mins)   Record Review and  Clarify Referral Question   5/13/2025 11:55-12:05PM Medical Record Review (10)  5/21/25 12:40-12:50PM Reviewed previous ADHD Assessment (10) 20 minutes   Clinical Decision Making/Battery Modification   6/23/2025 6:40-6:50AM emailed with therapist (10) 10 minutes   Integration/Report Generation   6/11/2025 10:00-10:10AM CNS (10)  6/11/2025 10:10-10:55AM Barkleys (45)  6/23/2025 10:10-10:20AM WAIS Report (10)  6/23/2025 10:20-11:00AM (40), 6/30/2025 10:35-11:00AM (25) Integrated Report (65)   130 minutes   Post-Service Work   6/30/2025 10:15-10:35AM 20 minutes   Total Time: 180 minutes (3 hours, 0 minutes)   Total Units: 1 2       Test Administration and Scoring Base: 19411  (1st 30 mins) Add-on: 48296  (each addtl 30 mins)   Test Administration (Face-to-Face)  6/2/2025 11:54-11:59AM Explained how to complete the Barkleys questionnaires, CNS  (5)  6/2/2025 12:50-1:00PM sent Barkleys and CNS (10)  6/18/2025 7:00-7:05AM [place] Administration (5)  6/18/2025 7:05-8:15AM WAIS IV Administration (70)   90 minutes   Scoring (Non-Face-to-Face)   6/18/25 10:25-10:40AM WAIS scoring 15 minutes   Total Time: 105 minutes (1 hours, 45 minutes)   Total Units: 1 2       Diagnosis(es): (ICD-10)  296.35 (F33.41)  Major Depressive Disorder, Recurrent Episode, In partial remission With anxious distress  300.02 (F41.1) Generalized Anxiety Disorder; Adjustment Disorders  309.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

## 2025-06-30 NOTE — PROGRESS NOTES
Progress Note    Patient Name: Liyah Rodriguez   Date: 6/30/2025          Service Type: Individual for ADHD Feedback      Session Start Time: 9:03AM  Session End Time: 10:13AM     Session Length: 70 minutes    Session #: ADHD Feedback    Attendees: Client attended alone    Service Modality:  Video Visit:      Provider verified identity through the following two step process.  Patient provided:  Patient is known previously to provider    Telemedicine Visit: The patient's condition can be safely assessed and treated via synchronous audio and visual telemedicine encounter.      Reason for Telemedicine Visit: Patient convenience (e.g. access to timely appointments / distance to available provider)    Originating Site (Patient Location): Patient's place of employment    Distant Site (Provider Location): Provider Remote Setting- Home Office    Consent:  The patient/guardian has verbally consented to: the potential risks and benefits of telemedicine (video visit) versus in person care; bill my insurance or make self-payment for services provided; and responsibility for payment of non-covered services.     Patient would like the video invitation sent by:  My Chart    Mode of Communication:  Video Conference via [facility]    Distant Location (Provider):  Off-site    As the provider I attest to compliance with applicable laws and regulations related to telemedicine.       DATA  Interactive Complexity: No  Crisis: No   Extended Session (53+ minutes):   - Patient's presenting concerns require more intensive intervention than could be completed within the usual service        Progress Since Last Session (Related to Symptoms / Goals / Homework):   Symptoms: No change fortask completion    Homework: Achieved / completed to satisfaction      Episode of Care Goals: Satisfactory progress - ACTION (Actively working towards change); Intervened by reinforcing change plan /  "affirming steps taken     Current / Ongoing Stressors and Concerns:   Home related stress with difficulty completing tasks      Treatment Objective(s) Addressed in This Session:     Provided feedback on ADHD evaluation. Reviewed test results in depth and answered Patient's questions. 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 This provider also completed full written report of evaluation, including integration of testing data, summary, and recommendations. Please see Documentation Only dated 6/30/2025.     Intervention:   ADHD Evaluation feedback; Reviewed report (can be found in Documentation Only encounter dated 6/30/2025); Patient was appreciative of the feedback and expressed understanding of the diagnoses. She said \"I think the biggest thing I was seeking out of this, is to figure out what is wrong so I can fee better. I am more hopeful than I was after the first evaluation. I'm hopeful something will change. \"    Discussed how patient feels like she needs to \"be a good girl\" and not speak her mind in relationships. Discussed patient's difficulty with completing chores at home and her desire to ask for more help from her . Patient reported that they have different standards for how clean their home should be.     Patient reported that she plans to talk with her PCP about stopping phentermine and asking to start Wellbutrin.      Interventions:  CBT: socratic questioning, normalizing, positive reinforcement   EFT: emotion checking          Assessments completed prior to visit:  The following assessments were completed by patient for this visit:  PHQ9:       12/6/2020     8:12 PM " 12/21/2020    10:15 AM 12/30/2021     9:45 PM 7/14/2022     9:31 AM 2/27/2023    10:39 AM 5/19/2025     9:18 AM 6/17/2025     8:02 AM   PHQ-9 SCORE   PHQ-9 Total Score Hirahart 5 (Mild depression) 4 (Minimal depression) 7 (Mild depression) 8 (Mild depression) 6 (Mild depression)  8 (Mild depression)   PHQ-9 Total Score 5 4 7 8 6 10 8        Patient-reported     GAD7:       12/21/2020    10:14 AM 12/30/2021     4:20 PM 7/14/2022     9:32 AM 2/27/2023    10:36 AM 5/19/2025     9:18 AM 6/2/2025     7:32 AM 6/17/2025     8:03 AM   LEEANN-7 SCORE   Total Score 3 (minimal anxiety) 8 (mild anxiety) 11 (moderate anxiety) 9 (mild anxiety)  13 (moderate anxiety) 14 (moderate anxiety)   Total Score 3 8 11 9 18 13  14        Patient-reported        ASSESSMENT: Current Emotional / Mental Status (status of significant symptoms):   Risk status (Self / Other harm or suicidal ideation)   Patient denies current fears or concerns for personal safety.   Patient denies current or recent suicidal ideation or behaviors.   Patient denies current or recent homicidal ideation or behaviors.   Patient denies current or recent self injurious behavior or ideation.   Patient denies other safety concerns.   Patient reports there has been no change in risk factors since their last session.     Patient reports there has been no change in protective factors since their last session.     Recommended that patient call 911 or go to the local ED should there be a change in any of these risk factors     Appearance:   Appropriate    Eye Contact:   Good    Psychomotor Behavior: Normal    Attitude:   Cooperative  Interested   Orientation:   All   Speech    Rate / Production: Normal/ Responsive Normal     Volume:  Normal    Mood:    Sad    Affect:    Appropriate  Tearful   Thought Content:  Clear    Thought Form:  Coherent  Logical    Insight:    Good      Medication Review:   No changes to current psychiatric medication(s)     Medication  Compliance:   NA     Changes in Health Issues:   None reported     Chemical Use Review:   Substance Use: Chemical use reviewed, no active concerns identified      Tobacco Use: No current tobacco use.      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       Recommendations:      1. Schedule an appointment with your physician to discuss a medication evaluation.  2. Individual therapy is recommended for the treatment of anxiety, trauma, and depression. Therapies focusing on identifying and challenging problematic thought processes can be beneficial.  You can schedule with an [facility] therapist by calling [phone].  Recommend patient continue working with current therapist.   3. Recommend patient see an EMDR certified therapist to treat trauma symptoms. [email or web address]/find-an-emdr-[email or web address]/  4. Access resources through websites, books, and articles such as those provided in the handout.  5. Schedule a follow-up appointment with me in about six weeks to review symptoms, treatment involvement, and struggles and/or successes.    You May Find the Following ADHD Resources  Helpful:    https://www.adhdrewired.com/  https://SNSplusd.com/  https://adultadhSilver Lining Solutions.com/  https://ethorityctor.com/about-2/  https://Anchor Therapeutics.com/nurys/  https://www.adhdmarriage.com/  https://cydney.org/  https://www.jenni.org/About-Mental-Illness/Mental-Health-Conditions/ADHD  https://www.1000 Markets.IGAWorks/  https://OpenPortalharPango.IGAWorks/  https://www.WomenCentric.IGAWorks/  https://www.Connectv.com.org/        Juliet Muniz Psy.D, XIOMARA    6/30/2025                                                          Psychological Testing   Billing/Services Summary       Testing Evaluation Services Base: 82050  (1st 60 mins) Add-on: 54938  (each addtl 60 mins)   Record Review and Clarify Referral Question   5/13/2025 11:55-12:05PM Medical Record Review (10)  5/21/25 12:40-12:50PM Reviewed previous ADHD Assessment (10) 20 minutes   Clinical Decision Making/Battery Modification   6/23/2025 6:40-6:50AM emailed with therapist (10) 10 minutes   Integration/Report Generation   6/11/2025 10:00-10:10AM CNS (10)  6/11/2025 10:10-10:55AM Barkleys (45)  6/23/2025 10:10-10:20AM WAIS Report (10)  6/23/2025 10:20-11:00AM (40), 6/30/2025 10:35-11:00AM (25) Integrated Report (65)   130 minutes   Post-Service Work   6/30/2025 10:15-10:35AM 20 minutes   Total Time: 180 minutes (3 hours, 0 minutes)   Total Units: 1 2       Test Administration and Scoring Base: 20589  (1st 30 mins) Add-on: 87023  (each addtl 30 mins)   Test Administration (Face-to-Face)  6/2/2025 11:54-11:59AM Explained how to complete the Barkleys questionnaires, CNS  (5)  6/2/2025 12:50-1:00PM sent Barkleys and CNS (10)  6/18/2025 7:00-7:05AM [place] Administration (5)  6/18/2025 7:05-8:15AM WAIS IV Administration (70)   90 minutes   Scoring (Non-Face-to-Face)   6/18/25 10:25-10:40AM WAIS scoring 15 minutes   Total Time: 105 minutes (1 hours, 45 minutes)   Total Units: 1 2       Diagnosis(es): (ICD-10)  296.35 (F33.41)  Major Depressive Disorder, Recurrent  Episode, In partial remission With anxious distress  300.02 (F41.1) Generalized Anxiety Disorder; Adjustment Disorders  309.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

## 2025-06-30 NOTE — PROGRESS NOTES
[facility]  ADHD Evaluation        Patient: Liyah Rodriguez   YOB: 1990  MRN: [id number]  Date(s) of assessment:  Diagnostic Assessment 6-2-25, Anushka self-report and collateral measures scored and interpreted 6-11-25, and WAIS 6-18-25  CNS Vital Signs Neurocognitive Battery 6-11-25        Information about appointment:  Client attended three sessions to aid in determining client's mental health diagnosis or diagnoses and treatment recommendations that best address client concerns. Client records including medical and previous evaluation were reviewed. A diagnostic assessment was conducted over the first two appointments. Client completed several rating scales to assist in assessing attention-related and other mental health symptoms that may be causing impairments in functioning. Rating scales were also completed by a collateral contact.    Assessment tools:      Anushka Adult ADHD Rating Scale-IV: Self and Other Reports (BAARS-IV), Anushka Functional Impairment Scale: Self and Other Reports (BFIS), Anushka Deficits in Executive Functioning Scale: Self and Other Reports (BDEFS), Wechsler Adult Intelligence Scale--Fourth Edition (WAIS-IV), Patient Health Questionnaire-9 (PHQ-9), Generalized Anxiety Disorder-7 (LEEANN-7), and CNS Vital Signs Neurocognitive Battery  [place] Sleepiness Scale  PTSD Checklist (PCL-5) SF-20    Some measures may have been completed remotely due to virtual care, in which case observation of task completion was not possible.      Assessment Results:    Behavioral Observations:  The Patient arrived early for all appointments and scheduled follow-up appointments efficiently. The Patient appeared motivated to complete the assessment and was polite in interactions. She was oriented by three. She appeared to experience difficulty with attention and hyperactivity/impulsivity during sessions. The following results are likely to be an accurate reflection  "of Patient's current functioning.     WAIS-IV Behavioral Observations  Patient arrived ambreen and appeared motivated to complete the test. She appeared to exhibit sound effort throughout the testing process. She asked for four questions to be repeated on the Arithmetic subtest.       Anushka Adult ADHD Rating Scale-IV: Self and Other Reports (BAARS-IV)  The BAARS-IV assesses for symptoms of ADHD that are experienced in one's daily life. This assessment measure includes self and collateral rating scales designed to provide information regarding current and childhood symptoms of ADHD including inattention, hyperactivity, and impulsivity. Self-report scores are reported as percentiles. Scores at the 76th-83rd percentile are considered marginal, scores at the 84th-92nd percentile are considered borderline, scores at the 93rd-95th percentile are considered mild, scores at the 96th-98th percentile are considered moderate, and those at the 99th percentile are considered severe. Collateral or \"other\" rating scales are reported as number of symptoms observed in comparison to those reported by the client. Norms and percentile scores are not available for collateral reports.      Current Symptoms Scale--Self Report:   Client completed the self-report inventory of current symptoms. The results indicate that the client's Total ADHD Score was 46 which places her in the 97th percentile for overall ADHD symptoms. In addition, the client endorsed 7/9 (98th percentile) Inattention symptoms, 3/9 (93rd percentile) Hyperactivity-Impulsivity symptoms, and 4/9 (93rd percentile) Sluggish Cognitive Tempo symptoms. Client indicated that the reported symptoms have resulted in impaired functioning in home and work. Overall, the results suggest the client is experiencing Moderate ADHD symptoms.      Current Symptoms Scale--Other Report:  Client's  completed the collateral report inventory of current symptoms. Based on the collateral " contact's observation of symptoms, the client demonstrates 1/9 Inattention symptoms, 0/5 Hyperactivity, 0/4 Impulsivity symptoms, and 0/9 Sluggish Cognitive Tempo symptoms. The client's Total ADHD Score was 24. The collateral contact indicated the client demonstrates impaired functioning in home and social relationships. The collateral- and self-report scores are significantly different for Inattention, Hyperactivity, and Total ADHD, and are within normal limits for Impulsivity and Sluggish Cognitive Tempo.      Childhood Symptoms Scale--Self-Report:  Client completed the self-report inventory of childhood symptoms. The results indicate that the client's Total ADHD Score was 41 which places her in the 88th percentile for overall ADHD symptoms in childhood. In addition, the client endorsed 8/9 (98th percentile) Inattention symptoms and  1/9 (78th percentile) Hyperactivity-Impulsivity symptoms. Client indicated that the reported symptoms resulted in impaired functioning in school and home. Overall, the results suggest the client experienced  Borderline symptoms of ADHD as a child.      Childhood Symptoms Scale--Other Report:  Client's mother completed the collateral report inventory of childhood symptoms. Based on the collateral contact's recollection of client's childhood symptoms, the client demonstrated 0/9 Inattention symptoms and 0/9 Hyperactivity-Impulsivity symptoms. The client's Total ADHD Score was 21. The collateral contact indicated the client demonstrates impaired functioning in no areas. The collateral- and self-report scores are significantly different.                           Anushka Functional Impairment Scale: Self and Other Reports (BFIS)  The BFIS is used to assess an individuals' psychosocial impairment in major life/daily activities that may be due to a mental health disorder. This assessment measure includes self and collateral rating scales. Self-report scores are reported as percentiles.  "Scores at the 76th-83rd percentile are considered marginal, scores at the 84th-92nd percentile are considered borderline, scores at the 93rd-95th percentile are considered mild, scores at the 96th-98th percentile are considered moderate, and those at the 99th percentile are considered severe.Collateral or \"other\" rating scales are reported as number of symptoms observed in comparison to those reported by the client. Norms and percentile scores are not available for collateral reports.      Results indicate the client identified impairment (scores at or greater than 93rd percentile) in the following areas: home-chores and daily responsibilities. The client's Mean Impairment Score was 2.93 (51st-75th percentile) indicating the client is reporting Marginal impairment in functioning across domains. Client's  completed the collateral rating scale, which indicated similar results. The collateral contact's scores were generally lower than the client's report.     Anushka Deficits in Executive Functioning Scale (BDEFS)  The BDEFS is a measure used for evaluating dimensions of adult executive functioning in daily life.This assessment measure includes self and collateral rating scales. Self-report scores are reported as percentiles. Scores at the 76th-83rd percentile are considered marginal, scores at the 84th-92nd percentile are considered borderline, scores at the 93rd-95th percentile are considered mild, scores at the 96th-98th percentile are considered moderate, and those at the 99th percentile are considered severe.Collateral or \"other\" rating scales are reported as number of symptoms observed in comparison to those reported by the client. Norms and percentile scores are not available for collateral reports.      Results indicate the client's Total Executive Functioning Score was 171  (79th percentile). The ADHD-Executive Functioning Index score was 20 (51st-75th percentile). These scores suggest the client has " Marginal deficits in executive functioning. These deficits may be due to ADHD. Results indicate the client identified significant deficits in the following areas: self-motivation 94th. Client's  completed the collateral rating scale, which indicated similar results. The collateral contact's scores were generally lower than the client's report.      Wechsler Adult Intelligence Scale--Fourth Edition (WAIS-IV)  Client s intellectual functioning was assessed using the WAIS-IV. This measure provides a Full Scale Score, as well as several index scores measuring specific areas of cognitive ability. Index scores are reported using standard scores which have a mean of 100 and a standard deviation of 15. Subtest scores are reported using scaled scores that have a mean of 10 and a standard deviation of 2. Client s scores are reported at the 95 percent confidence interval, which indicates that there is a 95 percent chance that his true score falls within the stated range.  Results indicate that the FSIQ, a measure of overall intelligence, is the best descriptor of the client s functioning.  Client s score on the Verbal Comprehension Index (VCI), a measure of verbal concept formation, verbal reasoning, and acquired knowledge, was in the Superior range (95% chance 120-132; 96th percentile). Client s score on the Perceptual Reasoning Index (JAYA), a measure of perceptual and fluid reasoning, spatial processing, and visual-motor integration, was in the Superior range (95% chance 118-130; 95th percentile). Client scored in the High Average range (95% chance 111-125;  90th percentile) on the Working Memory Index (WMI), a measure of ability to retain information in memory, perform some operation or manipulation, and produce a result. Client s score on the Processing Speed Index (PSI), a measure of short-term visual memory, attention, and visual-motor coordination was in the Superior range (95% chance 111-128; 93rd percentile).       Summary of WAIS-IV Index Scores    Index  Score Percentile Rank Confidence  Interval* Qualitative Description   Verbal Comprehension Index (VCI) 127 96 120-132 Superior   Perceptual Reasoning Index (JAYA) 125 95 118-130 Superior   Working Memory Index (WMI) 119 90 111-125 High Average   Processing Speed Index (PSI) 122 93 111-128 Superior   Full Scale IQ (FSIQ) 130 98 125-133 Very Superior      Summary of WAIS-IV Scaled Subtest Scores        Verbal Comprehension Perceptual Reasoning   Similarities 14 Block Design 15   Vocabulary 15 Matrix Reasoning 13   Information 15 Visual Puzzles 15   Working Memory Processing Speed   Letter-Number-Sequencing  14 Symbol Search 13   Arithmetic 13 Coding 15   *Confidence intervals at 95th percentile     Please note that the Letter-Number-Sequence subtest was supplemented for the Digit Span subtest due to the patient completing the Digit Span subtest in January of 2025 with another psychologist.     Please note that patient reported that she abstained from Phentermine for 4 days prior to completing the WAIS.          CNS Vital Signs Neurocognitive Battery  The CNS Vital Signs Neurocognitive Battery is a remotely-administered assessment comprised of seven core subtests to individually measure the patient's verbal memory, visual memory, motor speed, psychomotor speed, reaction time, focus, ability to sustain attention and ability to adapt to changing rules and tasks.       Above average domain scores indicate a standard score (SS) greater than 109 or a Percentile Rank (WA) greater than 74, indicating a high functioning test subject. Average is a SS  or WA 25-74, indicating normal function. Low Average is a SS 80-89 or WA 9-24 indicating a slight deficit or impairment. Below Average is a SS 70-79 or WA 2-8, indicating a moderate level of deficit or impairment. Very Low is a SS less than 70 or a WA less than 2, indicating a deficit and impairment.  Validity Indicator denotes a  guideline for representing the possibility of an invalid test or domain score, and can be influenced by patient understanding, effort, or other conditions.    The patient's results are detailed below:     Domain Standard Score Percentile Description Validity   Neurocognitive Index 97 42 Average YES   Composite Memory Measure 117 87 Above YES   Verbal Memory 116 86 Above YES   Visual Memory 113 81 Above YES   Psychomotor Speed 97 42 Average YES   Reaction Time 89 23 Low Average YES   *Complex Attention 94 34 Average YES   *Cognitive Flexibility 88 21 Low Average YES   *Processing Speed 117 87 Above YES   *Executive Function 94 34 Average YES   *Simple Attention 91 27 Average YES   Motor Speed 88 21 Low Average YES   *Scales that are the most indicative of ADHD     Neurocognitive Index (NCI): Measures an average score derived from the domain scores or a general assessment of the overall neurocognitive status of the patient. The patient's NCI score is 97, with a percentile of 42, and falls within the Average range.     Composite Memory: Measures how well subject can recognize, remember, and retrieve words and geometric figures, and is comprised of the Visual and Verbal Memory domains. The patient's Composite Memory score is 117, with a percentile of 87, and falls within the Above range.     Verbal Memory: Measures how well subject can recognize, remember, and retrieve words. The patient's Verbal Memory score is 116, with a percentile of 87, and falls within the Above range.     Visual Memory: Measures how well subject can recognize, remember and retrieve geometric figures. The patient's Visual Memory score is 113, with a percentile of 81, and falls within the Above range.     Psychomotor Speed: Measures how well a subject perceives, attends, responds to complex visual-perceptual information and performs simple fine motor coordination, and is comprised of the Motor Speed and Processing Speed indexes. The patient's  Psychomotor Speed score is 97, with a percentile of 42, and falls within the Average range.     Reaction Time: Measures how quickly the subject can react, in milliseconds, to a simple and increasingly complex direction set. The patient's Reaction Time score is 89, with a percentile of 23, and falls within the Low Average range.     Complex Attention: Measures the ability to track and respond to a variety of stimuli over lengthy periods of time and/or perform complex mental tasks requiring vigilance quickly and accurately. The patient's Complex Attention score is 94, with a percentile of 34, and falls within the Average range.     Cognitive Flexibility: Measures how well subject is able to adapt to rapidly changing and increasingly complex set of directions and/or to manipulate the information. The patient's Cognitive Flexibility score is 88, with a percentile of 21, and falls within the Low Average range.     Processing Speed: Measures how well a subject recognizes and processes information i.e., perceiving, attending/responding to incoming information, motor speed, fine motor coordination, and visual-perceptual ability. The patient's Processing Speed score is 117, with a percentile of 87, and falls within the Above range.     Executive Function: Measures how well a subject recognizes rules, categories, and manages or navigates rapid decision making. The patient's Executive Function score is 94, with a percentile of 34, and falls within the Average range.     Simple Attention: Measures the ability to track and respond to a single defined stimulus over lengthy periods of time while performing vigilance and response inhibition quickly and accurately to a simple task. The patient's Simple Attention score is 91, with a percentile of 27, and falls within the Average range.     Motor Speed: Measure: Ability to perform simple movements to produce and satisfy an intention towards a manual action and goal. The patient's  Motor Speed score is 88, with a percentile of 21, and falls within the Low Average range.    Please note that patient reported that she abstained from Phentermine for 4 days prior to completing the CNS Vital Signs Neurocognitive Battery.    [place] Sleepiness Scale:  Self-report measure of how likely the patient is to doze off in eight different situations. Max score is 24. Sufficient sleep is a score of 6 or less. Scores of 7-8 are considered average. A sleep medicine consultation is recommended for scores of 9 or above.  Patient's score = 6, considered not appropriate for a referral to a sleep specialist.     PTSD Checklist (PCL-5) SF-20  The PCL-5 is a 20-item self-report measure that assesses the 20 DSM-5 symptoms of PTSD. The PCL-5 has a variety of purposes, which include,   -Monitoring symptom change during and after treatment  -Screening individuals for PTSD  -Making a provisional PTSD diagnosis    A total score of 31-33 or higher suggests the patient may benefit from PTSD treatment.  Patient score = 39. It is recommended that patient seek treatment for their PTSD symptoms.       Generalized Anxiety Disorder Questionnaire (LEEANN-7)  This questionnaire is designed to assess for anxiety in adults.  Based on the score, she is experiencing moderate symptoms of anxiety. Client identified the following symptoms of anxiety: feeling on edge/nervous/anxious, difficulty controlling worry, worrying about many different things, trouble relaxing, being restless, and becoming easily annoyed or irritable.    Patient Health Questionnaire- 9 (PHQ-9)   This questionnaire is designed to assess for depression in adults.  Based on the score, she is experiencing mild symptoms of depression. Client identified the following symptoms of depression: depressed mood, lack of interest, difficulty with sleep, poor appetite or overeating, feeling bad about self, poor concentration, and restlessness or lethargy.       Collateral  Information:  Review of previous ADHD Assessment    Patient was assessed by Yvonne Tomas PsyD LP on 1/3/2025 and report written on 1/20/2025    Tests Administered:  Torres' Adult ADHD Rating Scales - Self-Report: Short Version  Torres Continuous Performance Test 3rd Edition  Digit Span from WAIS IV  Martin Making Test  MMPI-3      She was diagnosed with Depression and Generalized Anxiety Disorder.       Email collaboration with patient's therapist, OLIVE Maciel, [facility]     - How long have you worked with the patient? Client intake 12/28/2023    -What are the patient's diagnoses? Generalized Anxiety Disorder, Major Depressive Disorder, Recurrent, Moderate.     -Does the patient typically complete therapy homework? The client is moderately successful with therapy homework.      - Does the patient arrive on time for appointments? The client tends to arrive a few minutes prior to scheduled sessions.     -Does the patient appear hyperactive or easily distracted in sessions? No, in session the client typically presents as anxious, however, she is attentive, focused, and her thought process is linear.     -Does the patient tend to interrupt or have impulsive speech? No    -Does the patient need to be redirected often? No    -Does the patient talk about how ADHD symptoms negatively impact their life? Yes, the client often reports being impacted by difficulty with executive functioning, primarily in the home related to household chores. This appears to be compounded by limited support from her spouse, excessive acquisition of items, from both gifts and purchases and difficulty discarding items that are no longer in active use, often due in part to their perceived value and feeling overwhelmed by the task. The client is often avoidant of these tasks and experiences significant shame related to her capacity to maintain a tidy home.   The client reports difficulty with memory, predominantly related to scheduling in  "her personal life and occasionally with work related tasks. The client reports noticing these symptoms in the last six to twelve months.     -Does she typically talk about ADHD symptoms causing anxiety, or anxiety causing inattention. She seemed unsure when we discussed this. In the last few sessions, the client has expressed feelings of stress and anxiety related to her memory, predominantly scheduling.   The client reports feeling anxious about her executive functioning, specifically related to the order and tidiness of her home. The client's anxiety is significant across settings; however, it is likely that anxiety and/or depressive symptoms increase inattention.     -Is there anything else that you think would be helpful for me to know as I assess for ADHD?  While the client does present with some symptoms that could be consistent with ADHD,  the symptoms she has endorsed with this writer and her behavior in session do not appear to be consistent with ADHD and while I can not fully rule out ADHD it is my opinion that her symptoms are likely better explained by depression and anxiety. The client does currently appear to be in a depressive episode and it has been recommended that she consult with her physician to discuss changes to her current medication.      Please let me know if there is anything else I can do to help.     Thank you,   Jaylin Lind, MSW, [facility]            Summary (based on clinical interview, review of records, test results):  Identifying Information:  Patient is a 35 year old,    individual.  Patient was referred for an assessment by self and Milligan, Deirdre E, [facility] .  Patient attended the session alone.     Chief Complaint:   The reason for seeking services at this time is: \"ADHD Assessment \".  The problem(s) began Patient reported that she thinks the symptoms have been present since early childhood and they exacerbated at age 30 after she had a child.       " "  Patient reported that she has tried therapy and medication to treat her anxiety and depression.         The purpose of this evaluation is to: provide treatment recommendations and clarify diagnosis. Patient reported seeking services at this time for diagnostic assessment and recommendations for treatment.  Patient reported that has completed a previous ADHD diagnostic assessment at the age of 34.  Patient has received a previous diagnosis of anxiety. Patient reported that medication has not been prescribed medication to address these problems.         Patient reported that they have experienced recurrent episodes depression since approximately age 15. Patient reported that her most \"significant\" depression occurred when she was 30 years of age and was post-partum. Patient reported that her baby was born in 2020 and she experienced depression and anxiety. Patient reported that her baby was \"kind of colicy\" and they did not have in person appointments due to the COVID-19 pandemic.      Patient reported that they have experienced anxiety since approximately age 6.  Patient reported that her current triggers for anxiety include difficulty with completing chores and de-cluttering her home. Patient reported that she feels overwhelmed by the physical clutter and she also feels anxious about her task list not being completed. Patient reported that her anxiety and difficulty with completing tasks exacerbated when she had a child. Discussed how patient's stressors could be related to her phase of life. For example, difficulty staying on top of chores, while raising a child and working full time.      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 Patient reported that she sleeps 5-6 hours per night when she is working and approximately 8 hours per night when she is off for 9 days at a time.      Patient reported that she takes Phentermine " "to help with portion control and due to \"over-eating.\" Discussed patient asking her PCP if she can abstain from Phentermine for 4 days in a row if the plan is to move forward with cognitive testing.      Patient reported the following ADHD symptoms: is often easily distracted, is disorganized or experiences difficulty maintaining organization, difficulty with motivation to begin tasks, difficulty with task completion, tends to be forgetful, experiences difficulty with money management, and often engages in impulsive spending. Patient reported that the symptoms first began when they were approximately 6 years of age. Patient reported that she thinks the symptoms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 way of doing things  NO    (7) adopts a miserly spending style toward both self and others; money is viewed as something to be hoarded for future catastrophes  NO    (8) shows rigidity and stubbornness NO     Patient does not meet diagnostic criteria for Obsessive Compulsive Personality Disorder.          Post-traumatic Stress Disorder Diagnostic Criteria:      Exposure to actual or threatened death, serious injury, or sexual violence in one (or more) of the following ways:  Directly experiencing the traumatic  event(s).  -Witnessing, in person, the event(s) as it occurred to others.  -Learning that the traumatic event(s) occurred to a close family member or close friend. In cases of actual or threatened death of a family member or friend, the event(s) must have been violent or accidental.  YES  Patient reported that when she was 20, her cousin (older sister-like to patient)  by suicide.  Patient reported that when she was 21, her aunt  in a car accident.   Patient reported that when she was 25, her father  undecidedly from an aneurism.  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       -Experiencing repeated or extreme exposure to aversive details of the traumatic event(s) (e.g., first responders collecting human remains; police officers repeatedly exposed to details of child abuse). Note: Criterion A4 does not apply to exposure through electronic media, television, movies, or pictures, unless this exposure is work related.      Presence of one (or more) of the following intrusion symptoms associated with the traumatic event(s), beginning after the traumatic event(s) occurred:  -Recurrent, involuntary, and intrusive distressing memories of the traumatic event(s). Note: In children older than 6 years, repetitive play may occur in which themes or aspects of the traumatic event(s) are expressed. 28  -Recurrent distressing dreams in which the content and/or affect of the dream are related to the traumatic event(s). Note: In children, there may be frightening dreams without recognizable content.  -Dissociative reactions (e.g., flashbacks) in which the individual feels or acts as if the traumatic event(s) were recurring. (Such reactions may occur on a continuum, with the most extreme expression being a complete loss of awareness of present surroundings.) Note: In children,  trauma-specific reenactment may occur in play. YES, 20, 21, 28  -Intense or prolonged psychological distress at exposure to internal or external cues that symbolize or resemble an aspect of the traumatic event(s). 28  -Marked physiological reactions to internal or external cues that symbolize or resemble an aspect of the traumatic event(s). 28      Persistent avoidance of stimuli associated with the traumatic event(s), beginning after the traumatic event(s) occurred, as evidenced by one or both of the following:  -Avoidance of or efforts to avoid distressing memories, thoughts, or feelings about or closely associated with the traumatic event(s).  -Avoidance of or efforts to avoid external reminders (people, places, conversations, activities, objects, situations) that arouse distressing memories, thoughts, or feelings about or closely associated with the traumatic event(s).      Negative alterations in cognitions and mood associated with the traumatic event(s), beginning or worsening after the traumatic event(s) occurred, as evidenced by two (or more) of the following:  -Inability to remember an important aspect of the traumatic event(s) (typically due to dissociative amnesia, and not to other factors such as head injury, alcohol, or drugs). YES 20, 21  -Persistent and exaggerated negative beliefs or expectations about oneself, others, or the world (e.g.,  I am bad,   No one can be trusted,   The world is completely dangerous,   My whole nervous system is permanently ruined ).  -Persistent, distorted cognitions about the cause or consequences of the traumatic event(s) that lead the individual to blame himself/herself or others.  -Persistent negative emotional state (e.g., fear, horror, anger, guilt, or shame).  -Markedly diminished interest or participation in significant activities.  -Feelings of detachment or estrangement from others.  -Persistent inability to experience positive emotions (e.g., inability to  experience happiness, satisfaction, or loving feelings).      Marked alterations in arousal and reactivity associated with the traumatic event(s), beginning or worsening after the traumatic event(s) occurred, as evidenced by two (or more) of the following:  -Irritable behavior and angry outbursts (with little or no provocation), typically expressed as verbal or physical aggression toward people or objects.  -Reckless or self-destructive behavior.  -Hypervigilance.  -Exaggerated startle response.  -Problems with concentration.  -Sleep disturbance (e.g., difficulty falling or staying asleep or restless sleep).    Duration of the disturbance (Criteria B, C, D and E) is more than 1 month.    The disturbance causes clinically significant distress or impairment in social, occupational, or other important areas of functioning. YES  The disturbance is not attributable to the physiological effects of a substance (e.g., medication, alcohol) or another medical condition. YES    With delayed expression: If the full diagnostic criteria are not met until at least 6 months after the event (although the onset and expression of some symptoms may be immediate).      Patient does not currently meet diagnostic criteria for PTSD. 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       ADHD is a neurodevelopmental disorder. As such, to qualify for a diagnosis of ADHD an individual must show some symptoms of the disorder before the age of 12; these symptoms must currently be present to a degree that is inconsistent with their developmental level; the symptoms must negatively impact the individual;  they must be present for more than six months;and  they must occur in multiple areas of the individuals life (e.g. Home and school).     Results of testing were significant for depression, anxiety, and trauma. Rating scales suggested that the patient is currently experiencing symptoms of inattention that have been present to  some extent since childhood. The patient endorsed moderate overall symptoms of inattention and hyperactivity-impulsivity. The patient endorsed inattention more often than she did hyperactivity-impulsivity. The patient's collateral reporters indicating significantly fewer symptoms for current level of functioning and for her childhood.  The patient reported scores of mild functional impairment. She reported significant difficulty  with functioning in the following roles: home-chores and daily responsibilities. Moreover, she reported scores of marginal executive dysfunction. She reported significant difficulty with self-motivation. The patient and her collateral reporters reported similar scores on the rating scales. Intelligence testing suggested that the patient is functioning in the overall Very Superior range.  Her scores on the working memory and processing speed tasks were similar to her other scores. These tasks rely heavily on attention. This pattern is atypical among people with attention deficits.  Patient demonstrated lower scores on the Cognitive Flexibility domain of the CNS Vital Signs Neurocognitive Battery and Average or Above scores on the Complex Attention, Processing Speed, Executive Functioning, and Simple Attention CNS Vital Signs Neurocognitive Battery domains.  This pattern of scores is atypical among people with ADHD. Additionally, the patient is experiencing symptoms of anxiety and depression, which were reported on questionnaires.      In summary, data gathered over the course of this evaluation suggest that there is not evidence to support a diagnosis of ADHD. Data from ADHD questionnaires was equivocal, and Patient's pattern of scores on neurocognitive tasks (CNS Vital Signs) as well as IQ testing (WAIS-IV) argues against a fundamental deficit in attention abilities.       Patient was not administered the MMPI-3 or Trail Making Test (TMT), because she took the MMPI-3 and TMT in January of  2025.       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 Schedule an appointment with your physician to discuss a medication evaluation.  2. Individual therapy is recommended for the treatment of anxiety, trauma, and depression. Therapies focusing on identifying and challenging problematic thought processes can be beneficial.  You can schedule with an [facility] therapist by calling [phone].  Recommend patient continue working with current therapist.   3. Recommend patient see an EMDR certified therapist to treat trauma symptoms. https://www.emdria.org/find-an-emdr-therapist/  4. Access resources through websites, books, and articles such as those provided in the handout.  5. Schedule a follow-up appointment with me in about six weeks to review symptoms, treatment involvement, and struggles and/or successes.    You May Find the Following ADHD Resources Helpful:    https://www.VocalizeLocal.com/  https://[employer].GradeBeam/  https://Oxehealth.GradeBeam/  https://Travee.GradeBeam/about-2/  https://[employer].com/durga-mackenzie/  https://www.adhdmarriage.com/  https://cydney.org/  https://www.jenni.org/About-Mental-Illness/Mental-Health-Conditions/ADHD  https://www.Etaphase.GradeBeam/  https://[employer]Funtactixaline.com/  https://www.Jukely.com/  https://www.[employer].org/         Juliet Muniz Psy.D, LP     6/30/2025         Psychological Testing   Billing/Services Summary       Testing Evaluation Services Base: 11386  (1st 60 mins) Add-on: 37773  (each addtl 60 mins)   Record Review and  Clarify Referral Question   5/13/2025 11:55-12:05PM Medical Record Review (10)  5/21/25 12:40-12:50PM Reviewed previous ADHD Assessment (10) 20 minutes   Clinical Decision Making/Battery Modification   6/23/2025 6:40-6:50AM emailed with therapist (10) 10 minutes   Integration/Report Generation   6/11/2025 10:00-10:10AM CNS (10)  6/11/2025 10:10-10:55AM [place] (45)  6/23/2025 10:10-10:20AM WAIS Report (10)  6/23/2025 10:20-11:00AM (40), 6/30/2025 10:35-11:00AM (25) Integrated Report (65)   130 minutes   Post-Service Work   6/30/2025 10:15-10:35AM 20 minutes   Total Time: 180 minutes (3 hours, 0 minutes)   Total Units: 1 2       Test Administration and Scoring Base: 28982  (1st 30 mins) Add-on: 67643  (each addtl 30 mins)   Test Administration (Face-to-Face)  6/2/2025 11:54-11:59AM Explained how to complete the [place] questionnaires, CNS  (5)  6/2/2025 12:50-1:00PM sent [facility] and CNS (10)  6/18/2025 7:00-7:05AM [place] Administration (5)  6/18/2025 7:05-8:15AM WAIS IV Administration (70)   90 minutes   Scoring (Non-Face-to-Face)   6/18/25 10:25-10:40AM WAIS scoring 15 minutes   Total Time: 105 minutes (1 hours, 45 minutes)   Total Units: 1 2       Diagnosis(es): (ICD-10)  296.35 (F33.41)  Major Depressive Disorder, Recurrent Episode, In partial remission With anxious distress  300.02 (F41.1) Generalized Anxiety Disorder; Adjustment Disorders  309.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

## 2025-06-30 NOTE — Clinical Note
Greetings,  We completed the ADHD assessment and the patient was diagnosed with depression, anxiety, and trauma. She was not diagnosed with ADHD.  I encouraged them to schedule with you to discuss medication options.   Their full report is available for your review.   Please let me know if you have any questions.  Juliet Muniz PsyD LP

## 2025-07-07 ENCOUNTER — VIRTUAL VISIT (OUTPATIENT)
Dept: PEDIATRICS | Facility: CLINIC | Age: 35
End: 2025-07-07
Payer: COMMERCIAL

## 2025-07-07 ENCOUNTER — MYC MEDICAL ADVICE (OUTPATIENT)
Dept: PEDIATRICS | Facility: CLINIC | Age: 35
End: 2025-07-07

## 2025-07-07 PROCEDURE — 98006 SYNCH AUDIO-VIDEO EST MOD 30: CPT | Performed by: STUDENT IN AN ORGANIZED HEALTH CARE EDUCATION/TRAINING PROGRAM

## 2025-07-07 RX ORDER — HYDROXYZINE HYDROCHLORIDE 25 MG/1
25 TABLET, FILM COATED ORAL 2 TIMES DAILY PRN
Qty: 60 TABLET | Refills: 3 | Status: SHIPPED | OUTPATIENT
Start: 2025-07-07

## 2025-07-07 RX ORDER — BUPROPION HYDROCHLORIDE 300 MG/1
300 TABLET ORAL EVERY MORNING
Qty: 60 TABLET | Refills: 2 | Status: SHIPPED | OUTPATIENT
Start: 2025-07-07

## 2025-07-07 RX ORDER — BUPROPION HYDROCHLORIDE 150 MG/1
150 TABLET ORAL EVERY MORNING
Qty: 14 TABLET | Refills: 0 | Status: SHIPPED | OUTPATIENT
Start: 2025-07-07

## 2025-07-07 RX ORDER — SERTRALINE HYDROCHLORIDE 25 MG/1
TABLET, FILM COATED ORAL
Qty: 12 TABLET | Refills: 0 | Status: SHIPPED | OUTPATIENT
Start: 2025-07-07 | End: 2025-08-04

## 2025-07-07 ASSESSMENT — ANXIETY QUESTIONNAIRES
7. FEELING AFRAID AS IF SOMETHING AWFUL MIGHT HAPPEN: NOT AT ALL
6. BECOMING EASILY ANNOYED OR IRRITABLE: NOT AT ALL
5. BEING SO RESTLESS THAT IT IS HARD TO SIT STILL: SEVERAL DAYS
3. WORRYING TOO MUCH ABOUT DIFFERENT THINGS: MORE THAN HALF THE DAYS
GAD7 TOTAL SCORE: 9
GAD7 TOTAL SCORE: 9
7. FEELING AFRAID AS IF SOMETHING AWFUL MIGHT HAPPEN: NOT AT ALL
GAD7 TOTAL SCORE: 9
4. TROUBLE RELAXING: MORE THAN HALF THE DAYS
8. IF YOU CHECKED OFF ANY PROBLEMS, HOW DIFFICULT HAVE THESE MADE IT FOR YOU TO DO YOUR WORK, TAKE CARE OF THINGS AT HOME, OR GET ALONG WITH OTHER PEOPLE?: SOMEWHAT DIFFICULT
IF YOU CHECKED OFF ANY PROBLEMS ON THIS QUESTIONNAIRE, HOW DIFFICULT HAVE THESE PROBLEMS MADE IT FOR YOU TO DO YOUR WORK, TAKE CARE OF THINGS AT HOME, OR GET ALONG WITH OTHER PEOPLE: SOMEWHAT DIFFICULT
2. NOT BEING ABLE TO STOP OR CONTROL WORRYING: MORE THAN HALF THE DAYS
1. FEELING NERVOUS, ANXIOUS, OR ON EDGE: MORE THAN HALF THE DAYS

## 2025-07-07 NOTE — PROGRESS NOTES
"Liyah is a 35 year old who is being evaluated via a billable video visit.          Assessment & Plan     Postpartum depression  Taper sertraline (Zoloft) as not effective. Start Wellbutrin (bupropion). Had adhd evaluation and found to have more anxiety symptoms. Discussed potential effect of worsening anxiety with Wellbutrin (bupropion) and would consider Lexapro (escitalopram) if that becomes negative side effect. Hydroxyzine PRN for anxiety related insomnia.  - sertraline (ZOLOFT) 25 MG tablet; Take 0.5 tablets (12.5 mg) by mouth daily for 14 days, THEN 0.5 tablets (12.5 mg) every other day for 14 days.  - buPROPion (WELLBUTRIN XL) 150 MG 24 hr tablet; Take 1 tablet (150 mg) by mouth every morning.  - buPROPion (WELLBUTRIN XL) 300 MG 24 hr tablet; Take 1 tablet (300 mg) by mouth every morning.  - hydrOXYzine HCl (ATARAX) 25 MG tablet; Take 1 tablet (25 mg) by mouth 2 times daily as needed for anxiety.      Stopped phentermine  Continue losartan (Cozaar).    BMI  Estimated body mass index is 40.48 kg/m  as calculated from the following:    Height as of 5/5/25: 1.6 m (5' 3\").    Weight as of 5/5/25: 103.6 kg (228 lb 8 oz).               The longitudinal plan of care for the diagnosis(es)/condition(s) as documented were addressed during this visit. Due to the added complexity in care, I will continue to support Liyah in the subsequent management and with ongoing continuity of care.    Subjective   Liyah is a 35 year old, presenting for the following health issues:  No chief complaint on file.    History of Present Illness       Mental Health Follow-up:  Patient presents to follow-up on Depression & Anxiety.Patient's depression since last visit has been:  Worse  The patient is not having other symptoms associated with depression.  Patient's anxiety since last visit has been:  Worse  The patient is not having other symptoms associated with anxiety.  Any significant life events: No  Patient is feeling anxious or " having panic attacks.  Patient has no concerns about alcohol or drug use.    Reason for visit:  Medication change    She eats 2-3 servings of fruits and vegetables daily.She consumes 0 sweetened beverage(s) daily.She exercises with enough effort to increase her heart rate 20 to 29 minutes per day.  She exercises with enough effort to increase her heart rate 3 or less days per week. She is missing 1 dose(s) of medications per week.  She is not taking prescribed medications regularly due to remembering to take.        Increase dose of phentermine is not helping as much with appetite suppression                  Objective           Vitals:  No vitals were obtained today due to virtual visit.    Physical Exam   GENERAL: alert and no distress  EYES: Eyes grossly normal to inspection.  No discharge or erythema, or obvious scleral/conjunctival abnormalities.  RESP: No audible wheeze, cough, or visible cyanosis.    SKIN: Visible skin clear. No significant rash, abnormal pigmentation or lesions.  NEURO: Cranial nerves grossly intact.  Mentation and speech appropriate for age.  PSYCH: Appropriate affect, tone, and pace of words          Video-Visit Details    Type of service:  Video Visit   Originating Location (pt. Location): Home    Distant Location (provider location):  On-site  Platform used for Video Visit: Jose  Signed Electronically by: Deirdre E. Milligan, MD

## 2025-07-24 ASSESSMENT — ANXIETY QUESTIONNAIRES
7. FEELING AFRAID AS IF SOMETHING AWFUL MIGHT HAPPEN: SEVERAL DAYS
2. NOT BEING ABLE TO STOP OR CONTROL WORRYING: SEVERAL DAYS
4. TROUBLE RELAXING: SEVERAL DAYS
GAD7 TOTAL SCORE: 6
IF YOU CHECKED OFF ANY PROBLEMS ON THIS QUESTIONNAIRE, HOW DIFFICULT HAVE THESE PROBLEMS MADE IT FOR YOU TO DO YOUR WORK, TAKE CARE OF THINGS AT HOME, OR GET ALONG WITH OTHER PEOPLE: SOMEWHAT DIFFICULT
7. FEELING AFRAID AS IF SOMETHING AWFUL MIGHT HAPPEN: SEVERAL DAYS
3. WORRYING TOO MUCH ABOUT DIFFERENT THINGS: SEVERAL DAYS
GAD7 TOTAL SCORE: 6
GAD7 TOTAL SCORE: 6
5. BEING SO RESTLESS THAT IT IS HARD TO SIT STILL: SEVERAL DAYS
6. BECOMING EASILY ANNOYED OR IRRITABLE: NOT AT ALL
8. IF YOU CHECKED OFF ANY PROBLEMS, HOW DIFFICULT HAVE THESE MADE IT FOR YOU TO DO YOUR WORK, TAKE CARE OF THINGS AT HOME, OR GET ALONG WITH OTHER PEOPLE?: SOMEWHAT DIFFICULT
1. FEELING NERVOUS, ANXIOUS, OR ON EDGE: SEVERAL DAYS

## 2025-08-03 ENCOUNTER — HEALTH MAINTENANCE LETTER (OUTPATIENT)
Age: 35
End: 2025-08-03

## 2025-08-25 ENCOUNTER — PATIENT OUTREACH (OUTPATIENT)
Dept: CARE COORDINATION | Facility: CLINIC | Age: 35
End: 2025-08-25
Payer: COMMERCIAL

## (undated) DEVICE — Device

## (undated) DEVICE — SUCTION CANNULA UTERINE 10MM CVD  21553

## (undated) DEVICE — GLOVE SENSICARE 7.5 MSG1075 LATEX FREE

## (undated) DEVICE — SOL NACL 0.9% IRRIG 1000ML BOTTLE 2F7124

## (undated) DEVICE — SUCTION CANNULA UTERINE 07MM CVD  21853

## (undated) DEVICE — SPECIMEN TRAP VACUUM SUCTION SAFETOUCH 003853-902

## (undated) DEVICE — LINEN TOWEL PACK X5 5464

## (undated) DEVICE — NDL SPINAL 20GA 3.5" 405182

## (undated) DEVICE — GLOVE PROTEXIS MICRO 7.0  2D73PM70

## (undated) DEVICE — SOL WATER IRRIG 1000ML BOTTLE 2F7114

## (undated) DEVICE — TUBING SUCTION VACUUM COLLECTION 6FT 610

## (undated) DEVICE — STRAP KNEE/BODY 31143004

## (undated) DEVICE — SUCTION VACUUM CANISTER STANDARD W/LID&CAPS 003987-901

## (undated) DEVICE — LINEN GOWN X4 5410

## (undated) DEVICE — PAD CHUX UNDERPAD 30X36" P3036C

## (undated) RX ORDER — FENTANYL CITRATE 50 UG/ML
INJECTION, SOLUTION INTRAMUSCULAR; INTRAVENOUS
Status: DISPENSED
Start: 2019-05-24

## (undated) RX ORDER — LIDOCAINE HYDROCHLORIDE 10 MG/ML
INJECTION, SOLUTION EPIDURAL; INFILTRATION; INTRACAUDAL; PERINEURAL
Status: DISPENSED
Start: 2019-05-24

## (undated) RX ORDER — LIDOCAINE HYDROCHLORIDE 20 MG/ML
INJECTION, SOLUTION EPIDURAL; INFILTRATION; INTRACAUDAL; PERINEURAL
Status: DISPENSED
Start: 2019-05-24

## (undated) RX ORDER — CEFAZOLIN SODIUM 1 G/3ML
INJECTION, POWDER, FOR SOLUTION INTRAMUSCULAR; INTRAVENOUS
Status: DISPENSED
Start: 2019-05-24

## (undated) RX ORDER — PROPOFOL 10 MG/ML
INJECTION, EMULSION INTRAVENOUS
Status: DISPENSED
Start: 2019-05-24

## (undated) RX ORDER — LIDOCAINE HYDROCHLORIDE 10 MG/ML
INJECTION, SOLUTION EPIDURAL; INFILTRATION; INTRACAUDAL; PERINEURAL
Status: DISPENSED
Start: 2019-02-08